# Patient Record
Sex: MALE | Race: BLACK OR AFRICAN AMERICAN | Employment: UNEMPLOYED
[De-identification: names, ages, dates, MRNs, and addresses within clinical notes are randomized per-mention and may not be internally consistent; named-entity substitution may affect disease eponyms.]

---

## 2022-10-31 PROBLEM — R53.1 WEAKNESS: Status: ACTIVE | Noted: 2022-10-31

## 2022-11-01 PROBLEM — G93.41 ACUTE METABOLIC ENCEPHALOPATHY: Status: ACTIVE | Noted: 2022-11-01

## 2022-11-01 PROBLEM — N39.0 UTI (URINARY TRACT INFECTION): Status: ACTIVE | Noted: 2022-11-01

## 2022-11-01 PROBLEM — R55 SYNCOPE: Status: ACTIVE | Noted: 2022-11-01

## 2022-11-04 PROBLEM — F03.90 DEMENTIA (HCC): Status: ACTIVE | Noted: 2022-11-04

## 2022-11-04 PROBLEM — N39.0 UTI (URINARY TRACT INFECTION): Status: RESOLVED | Noted: 2022-11-01 | Resolved: 2022-11-04

## 2024-05-06 ENCOUNTER — APPOINTMENT (OUTPATIENT)
Facility: HOSPITAL | Age: 84
End: 2024-05-06
Payer: MEDICARE

## 2024-05-06 ENCOUNTER — HOSPITAL ENCOUNTER (INPATIENT)
Facility: HOSPITAL | Age: 84
LOS: 18 days | Discharge: HOSPICE | End: 2024-05-24
Attending: EMERGENCY MEDICINE | Admitting: INTERNAL MEDICINE
Payer: MEDICARE

## 2024-05-06 DIAGNOSIS — R41.82 ALTERED MENTAL STATUS, UNSPECIFIED ALTERED MENTAL STATUS TYPE: ICD-10-CM

## 2024-05-06 DIAGNOSIS — Z51.5 ENCOUNTER FOR PALLIATIVE CARE: Primary | ICD-10-CM

## 2024-05-06 DIAGNOSIS — R06.02 SHORTNESS OF BREATH: ICD-10-CM

## 2024-05-06 DIAGNOSIS — J96.01 ACUTE HYPOXEMIC RESPIRATORY FAILURE (HCC): ICD-10-CM

## 2024-05-06 PROBLEM — J96.00 ACUTE RESPIRATORY FAILURE (HCC): Status: ACTIVE | Noted: 2024-05-06

## 2024-05-06 LAB
ALBUMIN SERPL-MCNC: 3.9 G/DL (ref 3.4–5)
ALBUMIN/GLOB SERPL: 1.2 (ref 0.8–1.7)
ALP SERPL-CCNC: 75 U/L (ref 45–117)
ALT SERPL-CCNC: 14 U/L (ref 16–61)
AMMONIA PLAS-SCNC: 23 UMOL/L (ref 11–32)
ANION GAP BLD CALC-SCNC: ABNORMAL MMOL/L (ref 10–20)
ANION GAP SERPL CALC-SCNC: 9 MMOL/L (ref 3–18)
APAP SERPL-MCNC: <2 UG/ML (ref 10–30)
APPEARANCE UR: CLEAR
ARTERIAL PATENCY WRIST A: POSITIVE
AST SERPL-CCNC: 13 U/L (ref 10–38)
B PERT DNA SPEC QL NAA+PROBE: NOT DETECTED
BACTERIA URNS QL MICRO: ABNORMAL /HPF
BASE DEFICIT BLD-SCNC: 6.4 MMOL/L
BASE DEFICIT BLD-SCNC: 9.8 MMOL/L
BASOPHILS # BLD: 0 K/UL (ref 0–0.1)
BASOPHILS NFR BLD: 0 % (ref 0–2)
BDY SITE: ABNORMAL
BDY SITE: ABNORMAL
BILIRUB SERPL-MCNC: 0.3 MG/DL (ref 0.2–1)
BILIRUB UR QL: NEGATIVE
BODY TEMPERATURE: 97.5
BORDETELLA PARAPERTUSSIS BY PCR: NOT DETECTED
BUN SERPL-MCNC: 20 MG/DL (ref 7–18)
BUN/CREAT SERPL: 11 (ref 12–20)
C PNEUM DNA SPEC QL NAA+PROBE: NOT DETECTED
CA-I BLD-MCNC: 1.18 MMOL/L (ref 1.12–1.32)
CALCIUM SERPL-MCNC: 8.7 MG/DL (ref 8.5–10.1)
CHLORIDE BLD-SCNC: 111 MMOL/L (ref 98–107)
CHLORIDE SERPL-SCNC: 107 MMOL/L (ref 100–111)
CO2 BLD-SCNC: 19 MMOL/L (ref 19–24)
CO2 SERPL-SCNC: 22 MMOL/L (ref 21–32)
COLOR UR: ABNORMAL
CREAT BLD-MCNC: 1.41 MG/DL (ref 0.6–1.3)
CREAT SERPL-MCNC: 1.85 MG/DL (ref 0.6–1.3)
DIFFERENTIAL METHOD BLD: ABNORMAL
EOSINOPHIL # BLD: 0 K/UL (ref 0–0.4)
EOSINOPHIL NFR BLD: 0 % (ref 0–5)
EPITH CASTS URNS QL MICRO: ABNORMAL /LPF (ref 0–5)
ERYTHROCYTE [DISTWIDTH] IN BLOOD BY AUTOMATED COUNT: 13.4 % (ref 11.6–14.5)
ETHANOL SERPL-MCNC: <3 MG/DL (ref 0–3)
FIO2 ON VENT: 100 %
FLUAV SUBTYP SPEC NAA+PROBE: NOT DETECTED
FLUBV RNA SPEC QL NAA+PROBE: NOT DETECTED
GAS FLOW.O2 O2 DELIVERY SYS: ABNORMAL
GAS FLOW.O2 O2 DELIVERY SYS: ABNORMAL
GAS FLOW.O2 SETTING OXYMISER: 22 BPM
GLOBULIN SER CALC-MCNC: 3.2 G/DL (ref 2–4)
GLUCOSE BLD-MCNC: 194 MG/DL (ref 65–100)
GLUCOSE SERPL-MCNC: 192 MG/DL (ref 74–99)
GLUCOSE UR STRIP.AUTO-MCNC: NEGATIVE MG/DL
HADV DNA SPEC QL NAA+PROBE: NOT DETECTED
HCO3 BLD-SCNC: 16.6 MMOL/L (ref 22–26)
HCO3 BLD-SCNC: 18.8 MMOL/L (ref 22–26)
HCOV 229E RNA SPEC QL NAA+PROBE: NOT DETECTED
HCOV HKU1 RNA SPEC QL NAA+PROBE: NOT DETECTED
HCOV NL63 RNA SPEC QL NAA+PROBE: NOT DETECTED
HCOV OC43 RNA SPEC QL NAA+PROBE: NOT DETECTED
HCT VFR BLD AUTO: 45.1 % (ref 36–48)
HGB BLD-MCNC: 14.6 G/DL (ref 13–16)
HGB UR QL STRIP: NEGATIVE
HMPV RNA SPEC QL NAA+PROBE: NOT DETECTED
HPIV1 RNA SPEC QL NAA+PROBE: NOT DETECTED
HPIV2 RNA SPEC QL NAA+PROBE: NOT DETECTED
HPIV3 RNA SPEC QL NAA+PROBE: DETECTED
HPIV4 RNA SPEC QL NAA+PROBE: NOT DETECTED
IMM GRANULOCYTES # BLD AUTO: 0 K/UL (ref 0–0.04)
IMM GRANULOCYTES NFR BLD AUTO: 0 % (ref 0–0.5)
INR PPP: 1.1 (ref 0.9–1.1)
IPAP/PIP: 18
KETONES UR QL STRIP.AUTO: ABNORMAL MG/DL
LACTATE BLD-SCNC: 3.5 MMOL/L (ref 0.4–2)
LACTATE BLD-SCNC: 4.8 MMOL/L (ref 0.4–2)
LACTATE SERPL-SCNC: 3.9 MMOL/L (ref 0.4–2)
LEUKOCYTE ESTERASE UR QL STRIP.AUTO: NEGATIVE
LIPASE SERPL-CCNC: 38 U/L (ref 13–75)
LYMPHOCYTES # BLD: 1.3 K/UL (ref 0.9–3.6)
LYMPHOCYTES NFR BLD: 18 % (ref 21–52)
M PNEUMO DNA SPEC QL NAA+PROBE: NOT DETECTED
MAGNESIUM SERPL-MCNC: 1.9 MG/DL (ref 1.6–2.6)
MCH RBC QN AUTO: 30.5 PG (ref 24–34)
MCHC RBC AUTO-ENTMCNC: 32.4 G/DL (ref 31–37)
MCV RBC AUTO: 94.4 FL (ref 78–100)
MONOCYTES # BLD: 0.3 K/UL (ref 0.05–1.2)
MONOCYTES NFR BLD: 4 % (ref 3–10)
NEUTS SEG # BLD: 5.9 K/UL (ref 1.8–8)
NEUTS SEG NFR BLD: 78 % (ref 40–73)
NITRITE UR QL STRIP.AUTO: NEGATIVE
NRBC # BLD: 0 K/UL (ref 0–0.01)
NRBC BLD-RTO: 0 PER 100 WBC
O2/TOTAL GAS SETTING VFR VENT: 100 %
PCO2 BLD: 34.9 MMHG (ref 35–45)
PCO2 BLD: 37.5 MMHG (ref 35–45)
PEEP RESPIRATORY: 8
PH BLD: 7.26 (ref 7.35–7.45)
PH BLD: 7.34 (ref 7.35–7.45)
PH UR STRIP: 5.5 (ref 5–8)
PLATELET # BLD AUTO: 226 K/UL (ref 135–420)
PMV BLD AUTO: 10.1 FL (ref 9.2–11.8)
PO2 BLD: 51 MMHG (ref 80–100)
PO2 BLD: 79 MMHG (ref 80–100)
POTASSIUM BLD-SCNC: 3.9 MMOL/L (ref 3.5–5.1)
POTASSIUM SERPL-SCNC: 4.1 MMOL/L (ref 3.5–5.5)
PRESSURE SUPPORT SETTING VENT: 10
PROCALCITONIN SERPL-MCNC: <0.05 NG/ML
PROT SERPL-MCNC: 7.1 G/DL (ref 6.4–8.2)
PROT UR STRIP-MCNC: ABNORMAL MG/DL
PROTHROMBIN TIME: 13.8 SEC (ref 11.9–14.7)
RBC # BLD AUTO: 4.78 M/UL (ref 4.35–5.65)
RBC #/AREA URNS HPF: NEGATIVE /HPF (ref 0–5)
RESPIRATORY RATE, POC: 33 (ref 5–40)
RSV RNA SPEC QL NAA+PROBE: NOT DETECTED
RV+EV RNA SPEC QL NAA+PROBE: NOT DETECTED
SALICYLATES SERPL-MCNC: <1.7 MG/DL (ref 2.8–20)
SAO2 % BLD: 85 %
SAO2 % BLD: 93.5 % (ref 92–97)
SARS-COV-2 RNA RESP QL NAA+PROBE: NOT DETECTED
SERVICE CMNT-IMP: ABNORMAL
SODIUM BLD-SCNC: 140 MMOL/L (ref 136–145)
SODIUM SERPL-SCNC: 138 MMOL/L (ref 136–145)
SP GR UR REFRACTOMETRY: 1.02 (ref 1–1.03)
SPECIMEN SITE: ABNORMAL
SPECIMEN TYPE: ABNORMAL
T4 FREE SERPL-MCNC: 1.4 NG/DL (ref 0.7–1.5)
TRIGL SERPL-MCNC: 138 MG/DL
TROPONIN I SERPL HS-MCNC: 3 NG/L (ref 0–78)
TSH SERPL DL<=0.05 MIU/L-ACNC: 9.37 UIU/ML (ref 0.36–3.74)
UROBILINOGEN UR QL STRIP.AUTO: 1 EU/DL (ref 0.2–1)
VENTILATION MODE VENT: ABNORMAL
VENTILATION MODE VENT: ABNORMAL
VT SETTING VENT: 450 ML
WBC # BLD AUTO: 7.5 K/UL (ref 4.6–13.2)
WBC URNS QL MICRO: ABNORMAL /HPF (ref 0–4)

## 2024-05-06 PROCEDURE — 71045 X-RAY EXAM CHEST 1 VIEW: CPT

## 2024-05-06 PROCEDURE — 94660 CPAP INITIATION&MGMT: CPT

## 2024-05-06 PROCEDURE — 83605 ASSAY OF LACTIC ACID: CPT

## 2024-05-06 PROCEDURE — 71275 CT ANGIOGRAPHY CHEST: CPT

## 2024-05-06 PROCEDURE — 74177 CT ABD & PELVIS W/CONTRAST: CPT

## 2024-05-06 PROCEDURE — 99291 CRITICAL CARE FIRST HOUR: CPT

## 2024-05-06 PROCEDURE — 84295 ASSAY OF SERUM SODIUM: CPT

## 2024-05-06 PROCEDURE — 80053 COMPREHEN METABOLIC PANEL: CPT

## 2024-05-06 PROCEDURE — 87154 CUL TYP ID BLD PTHGN 6+ TRGT: CPT

## 2024-05-06 PROCEDURE — 84443 ASSAY THYROID STIM HORMONE: CPT

## 2024-05-06 PROCEDURE — 02HV33Z INSERTION OF INFUSION DEVICE INTO SUPERIOR VENA CAVA, PERCUTANEOUS APPROACH: ICD-10-PCS | Performed by: INTERNAL MEDICINE

## 2024-05-06 PROCEDURE — 85610 PROTHROMBIN TIME: CPT

## 2024-05-06 PROCEDURE — 36556 INSERT NON-TUNNEL CV CATH: CPT

## 2024-05-06 PROCEDURE — 82947 ASSAY GLUCOSE BLOOD QUANT: CPT

## 2024-05-06 PROCEDURE — 36600 WITHDRAWAL OF ARTERIAL BLOOD: CPT

## 2024-05-06 PROCEDURE — 96374 THER/PROPH/DIAG INJ IV PUSH: CPT

## 2024-05-06 PROCEDURE — 6360000002 HC RX W HCPCS: Performed by: EMERGENCY MEDICINE

## 2024-05-06 PROCEDURE — 83735 ASSAY OF MAGNESIUM: CPT

## 2024-05-06 PROCEDURE — 82140 ASSAY OF AMMONIA: CPT

## 2024-05-06 PROCEDURE — 6360000002 HC RX W HCPCS

## 2024-05-06 PROCEDURE — 80179 DRUG ASSAY SALICYLATE: CPT

## 2024-05-06 PROCEDURE — 84439 ASSAY OF FREE THYROXINE: CPT

## 2024-05-06 PROCEDURE — 2580000003 HC RX 258: Performed by: EMERGENCY MEDICINE

## 2024-05-06 PROCEDURE — 84478 ASSAY OF TRIGLYCERIDES: CPT

## 2024-05-06 PROCEDURE — 82077 ASSAY SPEC XCP UR&BREATH IA: CPT

## 2024-05-06 PROCEDURE — 87449 NOS EACH ORGANISM AG IA: CPT

## 2024-05-06 PROCEDURE — 70450 CT HEAD/BRAIN W/O DYE: CPT

## 2024-05-06 PROCEDURE — 0202U NFCT DS 22 TRGT SARS-COV-2: CPT

## 2024-05-06 PROCEDURE — 6360000004 HC RX CONTRAST MEDICATION: Performed by: EMERGENCY MEDICINE

## 2024-05-06 PROCEDURE — 31500 INSERT EMERGENCY AIRWAY: CPT

## 2024-05-06 PROCEDURE — 82330 ASSAY OF CALCIUM: CPT

## 2024-05-06 PROCEDURE — 0BJ08ZZ INSPECTION OF TRACHEOBRONCHIAL TREE, VIA NATURAL OR ARTIFICIAL OPENING ENDOSCOPIC: ICD-10-PCS | Performed by: INTERNAL MEDICINE

## 2024-05-06 PROCEDURE — 84132 ASSAY OF SERUM POTASSIUM: CPT

## 2024-05-06 PROCEDURE — 1100000000 HC RM PRIVATE

## 2024-05-06 PROCEDURE — 94002 VENT MGMT INPAT INIT DAY: CPT

## 2024-05-06 PROCEDURE — 84145 PROCALCITONIN (PCT): CPT

## 2024-05-06 PROCEDURE — 2500000003 HC RX 250 WO HCPCS: Performed by: EMERGENCY MEDICINE

## 2024-05-06 PROCEDURE — 0BH17EZ INSERTION OF ENDOTRACHEAL AIRWAY INTO TRACHEA, VIA NATURAL OR ARTIFICIAL OPENING: ICD-10-PCS | Performed by: INTERNAL MEDICINE

## 2024-05-06 PROCEDURE — 83690 ASSAY OF LIPASE: CPT

## 2024-05-06 PROCEDURE — 93005 ELECTROCARDIOGRAM TRACING: CPT | Performed by: EMERGENCY MEDICINE

## 2024-05-06 PROCEDURE — 3E043XZ INTRODUCTION OF VASOPRESSOR INTO CENTRAL VEIN, PERCUTANEOUS APPROACH: ICD-10-PCS | Performed by: INTERNAL MEDICINE

## 2024-05-06 PROCEDURE — 82803 BLOOD GASES ANY COMBINATION: CPT

## 2024-05-06 PROCEDURE — 80143 DRUG ASSAY ACETAMINOPHEN: CPT

## 2024-05-06 PROCEDURE — 96375 TX/PRO/DX INJ NEW DRUG ADDON: CPT

## 2024-05-06 PROCEDURE — 85014 HEMATOCRIT: CPT

## 2024-05-06 PROCEDURE — 84484 ASSAY OF TROPONIN QUANT: CPT

## 2024-05-06 PROCEDURE — 85025 COMPLETE CBC W/AUTO DIFF WBC: CPT

## 2024-05-06 PROCEDURE — 96365 THER/PROPH/DIAG IV INF INIT: CPT

## 2024-05-06 PROCEDURE — 81001 URINALYSIS AUTO W/SCOPE: CPT

## 2024-05-06 PROCEDURE — 5A1955Z RESPIRATORY VENTILATION, GREATER THAN 96 CONSECUTIVE HOURS: ICD-10-PCS | Performed by: INTERNAL MEDICINE

## 2024-05-06 PROCEDURE — 87040 BLOOD CULTURE FOR BACTERIA: CPT

## 2024-05-06 RX ORDER — THIAMINE HYDROCHLORIDE 100 MG/ML
100 INJECTION, SOLUTION INTRAMUSCULAR; INTRAVENOUS DAILY
Status: DISCONTINUED | OUTPATIENT
Start: 2024-05-07 | End: 2024-05-23

## 2024-05-06 RX ORDER — ONDANSETRON 4 MG/1
4 TABLET, ORALLY DISINTEGRATING ORAL EVERY 8 HOURS PRN
Status: DISCONTINUED | OUTPATIENT
Start: 2024-05-06 | End: 2024-05-24 | Stop reason: HOSPADM

## 2024-05-06 RX ORDER — PROPOFOL 10 MG/ML
5-50 INJECTION, EMULSION INTRAVENOUS CONTINUOUS
Status: DISCONTINUED | OUTPATIENT
Start: 2024-05-06 | End: 2024-05-06

## 2024-05-06 RX ORDER — DEXMEDETOMIDINE HYDROCHLORIDE 4 UG/ML
.1-1.5 INJECTION, SOLUTION INTRAVENOUS CONTINUOUS
Status: DISCONTINUED | OUTPATIENT
Start: 2024-05-06 | End: 2024-05-12

## 2024-05-06 RX ORDER — NALOXONE HYDROCHLORIDE 0.4 MG/ML
INJECTION, SOLUTION INTRAMUSCULAR; INTRAVENOUS; SUBCUTANEOUS
Status: COMPLETED
Start: 2024-05-06 | End: 2024-05-06

## 2024-05-06 RX ORDER — POLYETHYLENE GLYCOL 3350 17 G/17G
34 POWDER, FOR SOLUTION ORAL 2 TIMES DAILY
Status: DISCONTINUED | OUTPATIENT
Start: 2024-05-06 | End: 2024-05-12

## 2024-05-06 RX ORDER — ONDANSETRON 2 MG/ML
4 INJECTION INTRAMUSCULAR; INTRAVENOUS EVERY 6 HOURS PRN
Status: DISCONTINUED | OUTPATIENT
Start: 2024-05-06 | End: 2024-05-23

## 2024-05-06 RX ORDER — IPRATROPIUM BROMIDE AND ALBUTEROL SULFATE 2.5; .5 MG/3ML; MG/3ML
1 SOLUTION RESPIRATORY (INHALATION) EVERY 4 HOURS PRN
Status: DISCONTINUED | OUTPATIENT
Start: 2024-05-06 | End: 2024-05-24 | Stop reason: HOSPADM

## 2024-05-06 RX ORDER — IODIXANOL 320 MG/ML
80 INJECTION, SOLUTION INTRAVASCULAR
Status: COMPLETED | OUTPATIENT
Start: 2024-05-06 | End: 2024-05-06

## 2024-05-06 RX ORDER — ACETAMINOPHEN 650 MG/1
650 SUPPOSITORY RECTAL EVERY 6 HOURS PRN
Status: DISCONTINUED | OUTPATIENT
Start: 2024-05-06 | End: 2024-05-23 | Stop reason: SDUPTHER

## 2024-05-06 RX ORDER — FENTANYL CITRATE 50 UG/ML
50 INJECTION, SOLUTION INTRAMUSCULAR; INTRAVENOUS EVERY 30 MIN PRN
Status: DISCONTINUED | OUTPATIENT
Start: 2024-05-06 | End: 2024-05-13

## 2024-05-06 RX ORDER — NOREPINEPHRINE BITARTRATE 0.06 MG/ML
1-100 INJECTION, SOLUTION INTRAVENOUS CONTINUOUS
Status: DISCONTINUED | OUTPATIENT
Start: 2024-05-06 | End: 2024-05-12

## 2024-05-06 RX ORDER — ARFORMOTEROL TARTRATE 15 UG/2ML
15 SOLUTION RESPIRATORY (INHALATION)
Status: DISCONTINUED | OUTPATIENT
Start: 2024-05-06 | End: 2024-05-07

## 2024-05-06 RX ORDER — CHLORHEXIDINE GLUCONATE ORAL RINSE 1.2 MG/ML
15 SOLUTION DENTAL 2 TIMES DAILY
Status: DISCONTINUED | OUTPATIENT
Start: 2024-05-06 | End: 2024-05-23

## 2024-05-06 RX ORDER — POLYETHYLENE GLYCOL 3350 17 G/17G
17 POWDER, FOR SOLUTION ORAL DAILY PRN
Status: DISCONTINUED | OUTPATIENT
Start: 2024-05-06 | End: 2024-05-06

## 2024-05-06 RX ORDER — HEPARIN SODIUM 5000 [USP'U]/ML
5000 INJECTION, SOLUTION INTRAVENOUS; SUBCUTANEOUS EVERY 8 HOURS SCHEDULED
Status: DISCONTINUED | OUTPATIENT
Start: 2024-05-06 | End: 2024-05-23

## 2024-05-06 RX ORDER — FENTANYL CITRATE-0.9 % NACL/PF 10 MCG/ML
25-200 PLASTIC BAG, INJECTION (ML) INTRAVENOUS CONTINUOUS
Status: DISCONTINUED | OUTPATIENT
Start: 2024-05-06 | End: 2024-05-06 | Stop reason: CLARIF

## 2024-05-06 RX ORDER — FENTANYL CITRATE-0.9 % NACL/PF 20 MCG/2ML
50 SYRINGE (ML) INTRAVENOUS EVERY 30 MIN PRN
Status: DISCONTINUED | OUTPATIENT
Start: 2024-05-06 | End: 2024-05-06 | Stop reason: CLARIF

## 2024-05-06 RX ORDER — ACETAMINOPHEN 325 MG/1
650 TABLET ORAL EVERY 6 HOURS PRN
Status: DISCONTINUED | OUTPATIENT
Start: 2024-05-06 | End: 2024-05-23

## 2024-05-06 RX ORDER — FENTANYL CITRATE-0.9 % NACL/PF 10 MCG/ML
25-200 PLASTIC BAG, INJECTION (ML) INTRAVENOUS CONTINUOUS
Status: DISCONTINUED | OUTPATIENT
Start: 2024-05-06 | End: 2024-05-08

## 2024-05-06 RX ORDER — MIDAZOLAM HYDROCHLORIDE 1 MG/ML
5 INJECTION INTRAMUSCULAR; INTRAVENOUS
Status: COMPLETED | OUTPATIENT
Start: 2024-05-06 | End: 2024-05-06

## 2024-05-06 RX ORDER — METRONIDAZOLE 500 MG/100ML
500 INJECTION, SOLUTION INTRAVENOUS EVERY 8 HOURS
Status: DISCONTINUED | OUTPATIENT
Start: 2024-05-06 | End: 2024-05-07

## 2024-05-06 RX ORDER — BUDESONIDE 1 MG/2ML
1 INHALANT ORAL 2 TIMES DAILY
Status: DISCONTINUED | OUTPATIENT
Start: 2024-05-06 | End: 2024-05-07

## 2024-05-06 RX ADMIN — FENTANYL CITRATE 50 MCG/HR: at 20:52

## 2024-05-06 RX ADMIN — IODIXANOL 80 ML: 320 INJECTION, SOLUTION INTRAVASCULAR at 20:22

## 2024-05-06 RX ADMIN — PIPERACILLIN AND TAZOBACTAM 4500 MG: 4; .5 INJECTION, POWDER, FOR SOLUTION INTRAVENOUS at 19:41

## 2024-05-06 RX ADMIN — NALOXONE HYDROCHLORIDE 0.4 MG: 0.4 INJECTION, SOLUTION INTRAMUSCULAR; INTRAVENOUS; SUBCUTANEOUS at 17:45

## 2024-05-06 RX ADMIN — Medication 5 MCG/MIN: at 19:18

## 2024-05-06 RX ADMIN — PROPOFOL 20 MCG/KG/MIN: 10 INJECTION, EMULSION INTRAVENOUS at 19:02

## 2024-05-06 RX ADMIN — MIDAZOLAM 5 MG: 1 INJECTION INTRAMUSCULAR; INTRAVENOUS at 19:00

## 2024-05-06 ASSESSMENT — PULMONARY FUNCTION TESTS
PIF_VALUE: 27
PIF_VALUE: 14

## 2024-05-06 NOTE — ED NOTES
1835 - pt had large amount of emesis while on bipap. Mask removed and pt rolled onto side and suctioned as much emesis as possible. MD alerted. Verbal order for intubation.     1839 - Etomidate 20mg IV administered. 100mg succ IV administered.    1841 - Successful intubation, 24 at the lip. Placed on ventilator.

## 2024-05-06 NOTE — PROGRESS NOTES
Initiated Mechanical Ventilation due to hypoxia, unresponsiveness, and airway protection.  Patient vomiting and aspirating..   05/06/24 1900   Patient Observation   Pulse (!) 126   Respirations (!) 41   Breath Sounds   Breath Sounds Bilateral Crackles    Vent Information   Ventilator Day(s) 1   Ventilator Initiate Yes   Vent Mode AC/PC   $Ventilation $Initial Day   Ventilator Settings   FiO2  100 %   Insp Time (sec) 0.8 sec   Resp Rate (Set) 22 bpm   Set Pressure 16   PEEP/CPAP (cmH2O) 8   Vent Patient Data (Readings)   Vt (Measured) 590 mL   Peak Inspiratory Pressure (cmH2O) 27 cmH2O   Rate Measured 41 br/min   Minute Volume (L/min) 23.6 Liters   Mean Airway Pressure (cmH2O) 19 cmH20   I:E Ratio 1:2.1   Insp Rise Time (%) 50 %   Backup Rate 22 Breaths Per Minute   Vent Alarm Settings   High Pressure (cmH2O) 50 cmH2O   Low Minute Volume (lpm) 2 L/min   High Minute Volume (lpm) 30 L/min   Low Exhaled Vt (ml) 200 mL   RR High (bpm) 40 br/min   Apnea (secs) 20 secs   NIV Type   NIV Started/Stopped Off   Additional Respiratoray Assessments   Humidification Source Heated wire   Humidification Temp 37   Ambu Bag With Mask At Bedside Yes   Airway Clearance   Suction ET Tube   Suction Device Inline suction catheter   Sputum Method Obtained Endotracheal   Sputum Amount Large   Sputum Color/Odor Brown;Sheikh   Sputum Consistency Thin;Thick   ETT    Placement Date/Time: 05/06/24 1841   Present on Admission/Arrival: No  Placed By: In ED;Licensed provider  Placement Verified By: Auscultation;Capnometry;Colorimetric ETCO2 device;Direct visualization  Preoxygenation: Yes  Mask Ventilation: Ventilated...   $ Intubation Emergent  $ Yes   Secured At 24 cm   Measured From Lips   ETT Placement Center   Secured By Commercial tube busby

## 2024-05-06 NOTE — ED NOTES
MD is aware pt is extremely hard stick and IV's are not drawing back enough blood for labs.     MD at bedside now to place CVC.

## 2024-05-06 NOTE — ED TRIAGE NOTES
Pt arrived via EMS for AMS and hypoxia. [facility] adult [facility], began having AMS and vomiting. Per staff, pt became unresponsive. EMS arrived and pt responsive to painful sitmulus, satting in 80s on NRB.     Pt placed on bipap upon arrival.

## 2024-05-07 ENCOUNTER — APPOINTMENT (OUTPATIENT)
Facility: HOSPITAL | Age: 84
End: 2024-05-07
Payer: MEDICARE

## 2024-05-07 PROBLEM — G93.40 ACUTE ENCEPHALOPATHY: Status: ACTIVE | Noted: 2024-05-07

## 2024-05-07 PROBLEM — J69.0 ASPIRATION PNEUMONIA OF BOTH LOWER LOBES DUE TO VOMIT (HCC): Status: ACTIVE | Noted: 2024-05-07
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
## 2024-05-07 PROBLEM — T17.500A MUCUS PLUGGING OF BRONCHI: Status: ACTIVE | Noted: 2024-05-07

## 2024-05-07 PROBLEM — J96.01 ACUTE HYPOXEMIC RESPIRATORY FAILURE (HCC): Status: ACTIVE | Noted: 2024-05-07

## 2024-05-07 PROBLEM — T17.928A ASPIRATION OF FOOD: Status: ACTIVE | Noted: 2024-05-07

## 2024-05-07 LAB
ACCESSION NUMBER, LLC1M: ABNORMAL
ACINETOBACTER CALCOAC BAUMANNII COMPLEX BY PCR: NOT DETECTED
ANION GAP SERPL CALC-SCNC: 8 MMOL/L (ref 3–18)
ARTERIAL PATENCY WRIST A: POSITIVE
B FRAGILIS DNA BLD POS QL NAA+NON-PROBE: NOT DETECTED
BASE DEFICIT BLD-SCNC: 5.3 MMOL/L
BASOPHILS # BLD: 0 K/UL (ref 0–0.1)
BASOPHILS NFR BLD: 0 % (ref 0–2)
BDY SITE: ABNORMAL
BIOFIRE TEST COMMENT: ABNORMAL
BUN SERPL-MCNC: 25 MG/DL (ref 7–18)
BUN/CREAT SERPL: 11 (ref 12–20)
C ALBICANS DNA BLD POS QL NAA+NON-PROBE: NOT DETECTED
C AURIS DNA BLD POS QL NAA+NON-PROBE: NOT DETECTED
C GATTII+NEOFOR DNA BLD POS QL NAA+N-PRB: NOT DETECTED
C GLABRATA DNA BLD POS QL NAA+NON-PROBE: NOT DETECTED
C KRUSEI DNA BLD POS QL NAA+NON-PROBE: NOT DETECTED
C PARAP DNA BLD POS QL NAA+NON-PROBE: NOT DETECTED
C TROPICLS DNA BLD POS QL NAA+NON-PROBE: NOT DETECTED
CA-I SERPL-SCNC: 1.13 MMOL/L (ref 1.15–1.33)
CALCIUM SERPL-MCNC: 7.9 MG/DL (ref 8.5–10.1)
CHLORIDE SERPL-SCNC: 109 MMOL/L (ref 100–111)
CO2 SERPL-SCNC: 20 MMOL/L (ref 21–32)
CREAT SERPL-MCNC: 2.23 MG/DL (ref 0.6–1.3)
DIFFERENTIAL METHOD BLD: ABNORMAL
E CLOAC COMP DNA BLD POS NAA+NON-PROBE: NOT DETECTED
E COLI DNA BLD POS QL NAA+NON-PROBE: NOT DETECTED
E FAECALIS DNA BLD POS QL NAA+NON-PROBE: NOT DETECTED
E FAECIUM DNA BLD POS QL NAA+NON-PROBE: NOT DETECTED
EKG ATRIAL RATE: 70 BPM
EKG DIAGNOSIS: NORMAL
EKG P AXIS: 53 DEGREES
EKG P-R INTERVAL: 150 MS
EKG Q-T INTERVAL: 456 MS
EKG QRS DURATION: 76 MS
EKG QTC CALCULATION (BAZETT): 492 MS
EKG R AXIS: 13 DEGREES
EKG T AXIS: 49 DEGREES
EKG VENTRICULAR RATE: 70 BPM
ENTEROBACTERALES DNA BLD POS NAA+N-PRB: NOT DETECTED
EOSINOPHIL # BLD: 0 K/UL (ref 0–0.4)
EOSINOPHIL NFR BLD: 0 % (ref 0–5)
ERYTHROCYTE [DISTWIDTH] IN BLOOD BY AUTOMATED COUNT: 13.7 % (ref 11.6–14.5)
GAS FLOW.O2 O2 DELIVERY SYS: ABNORMAL
GAS FLOW.O2 SETTING OXYMISER: 22 BPM
GLUCOSE BLD STRIP.AUTO-MCNC: 177 MG/DL (ref 70–110)
GLUCOSE BLD STRIP.AUTO-MCNC: 197 MG/DL (ref 70–110)
GLUCOSE SERPL-MCNC: 178 MG/DL (ref 74–99)
GP B STREP DNA BLD POS QL NAA+NON-PROBE: NOT DETECTED
HAEM INFLU DNA BLD POS QL NAA+NON-PROBE: NOT DETECTED
HCO3 BLD-SCNC: 19.5 MMOL/L (ref 22–26)
HCT VFR BLD AUTO: 38.3 % (ref 36–48)
HGB BLD-MCNC: 12.7 G/DL (ref 13–16)
IMM GRANULOCYTES # BLD AUTO: 0 K/UL
IMM GRANULOCYTES NFR BLD AUTO: 0 %
INSPIRATION.DURATION SETTING TIME VENT: 0.8 SEC
IPAP/PIP/HIGH PEEP: 14
K OXYTOCA DNA BLD POS QL NAA+NON-PROBE: NOT DETECTED
KLEBSIELLA SP DNA BLD POS QL NAA+NON-PRB: NOT DETECTED
KLEBSIELLA SP DNA BLD POS QL NAA+NON-PRB: NOT DETECTED
L MONOCYTOG DNA BLD POS QL NAA+NON-PROBE: NOT DETECTED
L PNEUMO AG UR QL IA: NEGATIVE
LACTATE SERPL-SCNC: 3.2 MMOL/L (ref 0.4–2)
LACTATE SERPL-SCNC: 3.9 MMOL/L (ref 0.4–2)
LACTATE SERPL-SCNC: 4.9 MMOL/L (ref 0.4–2)
LACTATE SERPL-SCNC: 5.2 MMOL/L (ref 0.4–2)
LACTATE SERPL-SCNC: 5.3 MMOL/L (ref 0.4–2)
LACTATE SERPL-SCNC: 5.5 MMOL/L (ref 0.4–2)
LIPASE SERPL-CCNC: 33 U/L (ref 13–75)
LYMPHOCYTES # BLD: 1.8 K/UL (ref 0.9–3.6)
LYMPHOCYTES NFR BLD: 28 % (ref 21–52)
MAGNESIUM SERPL-MCNC: 1.7 MG/DL (ref 1.6–2.6)
MCH RBC QN AUTO: 31.2 PG (ref 24–34)
MCHC RBC AUTO-ENTMCNC: 33.2 G/DL (ref 31–37)
MCV RBC AUTO: 94.1 FL (ref 78–100)
MECA+MECC ISLT/SPM QL: DETECTED
MONOCYTES # BLD: 0.4 K/UL (ref 0.05–1.2)
MONOCYTES NFR BLD: 6 % (ref 3–10)
N MEN DNA BLD POS QL NAA+NON-PROBE: NOT DETECTED
NEUTS BAND NFR BLD MANUAL: 10 % (ref 0–5)
NEUTS SEG # BLD: 4.4 K/UL (ref 1.8–8)
NEUTS SEG NFR BLD: 56 % (ref 40–73)
NRBC # BLD: 0 K/UL (ref 0–0.01)
NRBC BLD-RTO: 0 PER 100 WBC
O2/TOTAL GAS SETTING VFR VENT: 100 %
P AERUGINOSA DNA BLD POS NAA+NON-PROBE: NOT DETECTED
PAW @ MEAN EXP FLOW ON VENT: 11 CMH2O
PCO2 BLD: 35.1 MMHG (ref 35–45)
PEEP RESPIRATORY: 8 CMH2O
PH BLD: 7.35 (ref 7.35–7.45)
PHOSPHATE SERPL-MCNC: 2.5 MG/DL (ref 2.5–4.9)
PHOSPHATE SERPL-MCNC: 3.1 MG/DL (ref 2.5–4.9)
PLATELET # BLD AUTO: 216 K/UL (ref 135–420)
PLATELET COMMENT: ABNORMAL
PMV BLD AUTO: 10.2 FL (ref 9.2–11.8)
PO2 BLD: 66 MMHG (ref 80–100)
POTASSIUM SERPL-SCNC: 4.6 MMOL/L (ref 3.5–5.5)
PROTEUS SP DNA BLD POS QL NAA+NON-PROBE: NOT DETECTED
RBC # BLD AUTO: 4.07 M/UL (ref 4.35–5.65)
RBC MORPH BLD: ABNORMAL
RESISTANT GENE TARGETS: ABNORMAL
RESPIRATORY RATE, POC: 24 (ref 5–40)
S AUREUS DNA BLD POS QL NAA+NON-PROBE: NOT DETECTED
S AUREUS+CONS DNA BLD POS NAA+NON-PROBE: DETECTED
S EPIDERMIDIS DNA BLD POS QL NAA+NON-PRB: DETECTED
S LUGDUNENSIS DNA BLD POS QL NAA+NON-PRB: NOT DETECTED
S MALTOPHILIA DNA BLD POS QL NAA+NON-PRB: NOT DETECTED
S MARCESCENS DNA BLD POS NAA+NON-PROBE: NOT DETECTED
S PNEUM AG UR QL: NEGATIVE
S PNEUM DNA BLD POS QL NAA+NON-PROBE: NOT DETECTED
S PYO DNA BLD POS QL NAA+NON-PROBE: NOT DETECTED
SALMONELLA DNA BLD POS QL NAA+NON-PROBE: NOT DETECTED
SAO2 % BLD: 92 % (ref 92–97)
SERVICE CMNT-IMP: ABNORMAL
SODIUM SERPL-SCNC: 137 MMOL/L (ref 136–145)
SPECIMEN TYPE: ABNORMAL
STREPTOCOCCUS DNA BLD POS NAA+NON-PROBE: NOT DETECTED
TROPONIN I SERPL HS-MCNC: 20 NG/L (ref 0–78)
TROPONIN I SERPL HS-MCNC: 24 NG/L (ref 0–78)
TROPONIN I SERPL HS-MCNC: 29 NG/L (ref 0–78)
TROPONIN I SERPL HS-MCNC: 30 NG/L (ref 0–78)
VENTILATION MODE VENT: ABNORMAL
WBC # BLD AUTO: 6.6 K/UL (ref 4.6–13.2)

## 2024-05-07 PROCEDURE — 87088 URINE BACTERIA CULTURE: CPT

## 2024-05-07 PROCEDURE — 94761 N-INVAS EAR/PLS OXIMETRY MLT: CPT

## 2024-05-07 PROCEDURE — 31624 DX BRONCHOSCOPE/LAVAGE: CPT

## 2024-05-07 PROCEDURE — 71045 X-RAY EXAM CHEST 1 VIEW: CPT

## 2024-05-07 PROCEDURE — 84484 ASSAY OF TROPONIN QUANT: CPT

## 2024-05-07 PROCEDURE — 83735 ASSAY OF MAGNESIUM: CPT

## 2024-05-07 PROCEDURE — 87070 CULTURE OTHR SPECIMN AEROBIC: CPT

## 2024-05-07 PROCEDURE — 82330 ASSAY OF CALCIUM: CPT

## 2024-05-07 PROCEDURE — 89220 SPUTUM SPECIMEN COLLECTION: CPT

## 2024-05-07 PROCEDURE — 87186 SC STD MICRODIL/AGAR DIL: CPT

## 2024-05-07 PROCEDURE — 6370000000 HC RX 637 (ALT 250 FOR IP): Performed by: EMERGENCY MEDICINE

## 2024-05-07 PROCEDURE — 80048 BASIC METABOLIC PNL TOTAL CA: CPT

## 2024-05-07 PROCEDURE — 82962 GLUCOSE BLOOD TEST: CPT

## 2024-05-07 PROCEDURE — 82803 BLOOD GASES ANY COMBINATION: CPT

## 2024-05-07 PROCEDURE — 6370000000 HC RX 637 (ALT 250 FOR IP): Performed by: NURSE PRACTITIONER

## 2024-05-07 PROCEDURE — 83605 ASSAY OF LACTIC ACID: CPT

## 2024-05-07 PROCEDURE — 2500000003 HC RX 250 WO HCPCS: Performed by: EMERGENCY MEDICINE

## 2024-05-07 PROCEDURE — 84100 ASSAY OF PHOSPHORUS: CPT

## 2024-05-07 PROCEDURE — 6360000002 HC RX W HCPCS: Performed by: INTERNAL MEDICINE

## 2024-05-07 PROCEDURE — 2580000003 HC RX 258: Performed by: REGISTERED NURSE

## 2024-05-07 PROCEDURE — 83690 ASSAY OF LIPASE: CPT

## 2024-05-07 PROCEDURE — 2580000003 HC RX 258: Performed by: EMERGENCY MEDICINE

## 2024-05-07 PROCEDURE — 87205 SMEAR GRAM STAIN: CPT

## 2024-05-07 PROCEDURE — 6360000002 HC RX W HCPCS: Performed by: REGISTERED NURSE

## 2024-05-07 PROCEDURE — 85025 COMPLETE CBC W/AUTO DIFF WBC: CPT

## 2024-05-07 PROCEDURE — 93010 ELECTROCARDIOGRAM REPORT: CPT | Performed by: INTERNAL MEDICINE

## 2024-05-07 PROCEDURE — 6360000002 HC RX W HCPCS: Performed by: NURSE PRACTITIONER

## 2024-05-07 PROCEDURE — 99292 CRITICAL CARE ADDL 30 MIN: CPT | Performed by: INTERNAL MEDICINE

## 2024-05-07 PROCEDURE — 2700000000 HC OXYGEN THERAPY PER DAY

## 2024-05-07 PROCEDURE — 2580000003 HC RX 258: Performed by: NURSE PRACTITIONER

## 2024-05-07 PROCEDURE — A4216 STERILE WATER/SALINE, 10 ML: HCPCS | Performed by: EMERGENCY MEDICINE

## 2024-05-07 PROCEDURE — 94003 VENT MGMT INPAT SUBQ DAY: CPT

## 2024-05-07 PROCEDURE — 2580000003 HC RX 258: Performed by: INTERNAL MEDICINE

## 2024-05-07 PROCEDURE — 31624 DX BRONCHOSCOPE/LAVAGE: CPT | Performed by: INTERNAL MEDICINE

## 2024-05-07 PROCEDURE — 36600 WITHDRAWAL OF ARTERIAL BLOOD: CPT

## 2024-05-07 PROCEDURE — 2000000000 HC ICU R&B

## 2024-05-07 PROCEDURE — 99223 1ST HOSP IP/OBS HIGH 75: CPT | Performed by: INTERNAL MEDICINE

## 2024-05-07 PROCEDURE — 99291 CRITICAL CARE FIRST HOUR: CPT | Performed by: INTERNAL MEDICINE

## 2024-05-07 PROCEDURE — 2500000003 HC RX 250 WO HCPCS: Performed by: NURSE PRACTITIONER

## 2024-05-07 PROCEDURE — 31635 BRONCHOSCOPY W/FB REMOVAL: CPT | Performed by: INTERNAL MEDICINE

## 2024-05-07 PROCEDURE — 31645 BRNCHSC W/THER ASPIR 1ST: CPT | Performed by: INTERNAL MEDICINE

## 2024-05-07 PROCEDURE — APPSS30 APP SPLIT SHARED TIME 16-30 MINUTES: Performed by: NURSE PRACTITIONER

## 2024-05-07 PROCEDURE — 94640 AIRWAY INHALATION TREATMENT: CPT

## 2024-05-07 RX ORDER — ARFORMOTEROL TARTRATE 15 UG/2ML
15 SOLUTION RESPIRATORY (INHALATION)
Status: DISCONTINUED | OUTPATIENT
Start: 2024-05-07 | End: 2024-05-08

## 2024-05-07 RX ORDER — BUDESONIDE 1 MG/2ML
1 INHALANT ORAL 2 TIMES DAILY
Status: DISCONTINUED | OUTPATIENT
Start: 2024-05-07 | End: 2024-05-10

## 2024-05-07 RX ADMIN — POLYETHYLENE GLYCOL 3350 34 G: 17 POWDER, FOR SOLUTION ORAL at 01:38

## 2024-05-07 RX ADMIN — WATER 2000 MG: 1 INJECTION INTRAMUSCULAR; INTRAVENOUS; SUBCUTANEOUS at 01:01

## 2024-05-07 RX ADMIN — BUDESONIDE 1 MG: 1 SUSPENSION RESPIRATORY (INHALATION) at 07:26

## 2024-05-07 RX ADMIN — Medication 16 MCG/MIN: at 10:51

## 2024-05-07 RX ADMIN — METRONIDAZOLE 500 MG: 500 INJECTION, SOLUTION INTRAVENOUS at 15:40

## 2024-05-07 RX ADMIN — METRONIDAZOLE 500 MG: 500 INJECTION, SOLUTION INTRAVENOUS at 08:00

## 2024-05-07 RX ADMIN — HEPARIN SODIUM 5000 UNITS: 5000 INJECTION INTRAVENOUS; SUBCUTANEOUS at 14:00

## 2024-05-07 RX ADMIN — DOCUSATE SODIUM LIQUID 100 MG: 100 LIQUID ORAL at 01:38

## 2024-05-07 RX ADMIN — ARFORMOTEROL TARTRATE 15 MCG: 15 SOLUTION RESPIRATORY (INHALATION) at 19:31

## 2024-05-07 RX ADMIN — POLYETHYLENE GLYCOL 3350 34 G: 17 POWDER, FOR SOLUTION ORAL at 22:40

## 2024-05-07 RX ADMIN — CHLORHEXIDINE GLUCONATE 15 ML: 1.2 RINSE ORAL at 07:59

## 2024-05-07 RX ADMIN — ARFORMOTEROL TARTRATE 15 MCG: 15 SOLUTION RESPIRATORY (INHALATION) at 07:25

## 2024-05-07 RX ADMIN — PIPERACILLIN AND TAZOBACTAM 3375 MG: 3; .375 INJECTION, POWDER, FOR SOLUTION INTRAVENOUS at 21:34

## 2024-05-07 RX ADMIN — BUDESONIDE 1 MG: 1 SUSPENSION RESPIRATORY (INHALATION) at 19:31

## 2024-05-07 RX ADMIN — DEXMEDETOMIDINE HYDROCHLORIDE 0.2 MCG/KG/HR: 4 INJECTION, SOLUTION INTRAVENOUS at 00:24

## 2024-05-07 RX ADMIN — METRONIDAZOLE 500 MG: 500 INJECTION, SOLUTION INTRAVENOUS at 01:33

## 2024-05-07 RX ADMIN — DOCUSATE SODIUM LIQUID 100 MG: 100 LIQUID ORAL at 07:59

## 2024-05-07 RX ADMIN — POLYETHYLENE GLYCOL 3350 34 G: 17 POWDER, FOR SOLUTION ORAL at 07:59

## 2024-05-07 RX ADMIN — IPRATROPIUM BROMIDE AND ALBUTEROL SULFATE 1 DOSE: .5; 3 SOLUTION RESPIRATORY (INHALATION) at 07:25

## 2024-05-07 RX ADMIN — HEPARIN SODIUM 5000 UNITS: 5000 INJECTION INTRAVENOUS; SUBCUTANEOUS at 22:40

## 2024-05-07 RX ADMIN — DEXMEDETOMIDINE HYDROCHLORIDE 0.6 MCG/KG/HR: 4 INJECTION, SOLUTION INTRAVENOUS at 21:40

## 2024-05-07 RX ADMIN — CEFEPIME 1000 MG: 1 INJECTION, POWDER, FOR SOLUTION INTRAMUSCULAR; INTRAVENOUS at 12:00

## 2024-05-07 RX ADMIN — DEXMEDETOMIDINE HYDROCHLORIDE 0.6 MCG/KG/HR: 4 INJECTION, SOLUTION INTRAVENOUS at 10:55

## 2024-05-07 RX ADMIN — THIAMINE HYDROCHLORIDE 100 MG: 100 INJECTION, SOLUTION INTRAMUSCULAR; INTRAVENOUS at 07:59

## 2024-05-07 RX ADMIN — HEPARIN SODIUM 5000 UNITS: 5000 INJECTION INTRAVENOUS; SUBCUTANEOUS at 05:00

## 2024-05-07 RX ADMIN — CHLORHEXIDINE GLUCONATE 15 ML: 1.2 RINSE ORAL at 22:41

## 2024-05-07 RX ADMIN — FAMOTIDINE 20 MG: 10 INJECTION INTRAVENOUS at 08:00

## 2024-05-07 RX ADMIN — PIPERACILLIN AND TAZOBACTAM 4500 MG: 4; .5 INJECTION, POWDER, FOR SOLUTION INTRAVENOUS at 12:55

## 2024-05-07 RX ADMIN — DOCUSATE SODIUM LIQUID 100 MG: 100 LIQUID ORAL at 22:40

## 2024-05-07 ASSESSMENT — PULMONARY FUNCTION TESTS
PIF_VALUE: 14
PIF_VALUE: 15
PIF_VALUE: 14
PIF_VALUE: 14
PIF_VALUE: 20
PIF_VALUE: 15
PIF_VALUE: 14

## 2024-05-07 NOTE — PROGRESS NOTES
Pt remains intubated and sedated. No signs of distress noted. Will continue to monitor and assess Respiratory Vitals.SBT will be attemped once pt's sedatoion is lessened.    05/07/24 0727   Patient Observation   Pulse 97   Respirations 27   SpO2 99 %   Breath Sounds   Breath Sounds Bilateral Diminished   Right Upper Lobe Diminished   Left Upper Lobe Diminished   Vent Information   Ventilator Day(s) 2   Ventilator ID [id number]   Vent Mode AC/PRVC   Ventilator Settings   FiO2  100 %   Insp Time (sec) 0.8 sec   Resp Rate (Set) 22 bpm   PEEP/CPAP (cmH2O) 8   Target Vt 500   Vent Patient Data (Readings)   Vt Mandatory Exp (mL) 569 mL   Vt (Measured) 569 mL   Peak Inspiratory Pressure (cmH2O) 14 cmH2O   Rate Measured 27 br/min   Minute Volume (L/min) 15.4 Liters   Mean Airway Pressure (cmH2O) 11 cmH20   Plateau Pressure (cm H2O) 9.3 cm H2O   Driving Pressure 1.3   Inspiratory Time 0.8 sec   I:E Ratio 1:1.7   Flow Sensitivity 4 L/min   I Time/ I Time % 0.8 s   Insp Rise Time (%) 50 %   Backup Apnea On   Backup Rate 22 Breaths Per Minute   Backup Vt 460   Vent Alarm Settings   Low Pressure (cmH2O) 13.5 cmH2O   High Pressure (cmH2O) 40 cmH2O   Low Minute Volume (lpm) 2 L/min   High Minute Volume (lpm) 25.5 L/min   Low Exhaled Vt (ml) 200 mL   High Exhaled Vt (ml) 800 mL   RR High (bpm) 50 br/min   Apnea (secs) 20 secs   Additional Respiratoray Assessments   Humidification Source Heated wire   Humidification Temp 7   Circuit Condensation Drained   Airway Clearance   Suction ET Tube   Subglottic Suction Done Yes   Suction Device Inline suction catheter   Sputum Method Obtained Endotracheal   Sputum Amount Moderate   Sputum Color/Odor Tan   Sputum Consistency Thin;Thick   $Obtained Sample $Induced Sputum (charge not used for Bronchoscopy)   ETT    Placement Date/Time: 05/06/24 1841   Present on Admission/Arrival: No  Placed By: In ED;Licensed provider  Placement Verified By: Auscultation;Capnometry;Colorimetric ETCO2

## 2024-05-07 NOTE — CARE COORDINATION
Initial Assessment    05/07/24 1133   Discharge Planning   Living Arrangements Children   Support Systems Children;Family Members   Current DME Prior to Arrival Cane   Potential Assistance Needed N/A   Potential Assistance Purchasing Medications No   Potential DME Needed Other (Comment)   Type of Home Care Services None  (Pending PT/OT recs)   Patient expects to be discharged to: House         83 y.o. male with a PMHx of dementia, DM II, HLD, and  COPD who presented to [facility] ER with encephalopathy and hypoxia. SW presented to bedside however, no family present and pt intubated.    SW paced call to pts Primary decision maker/Dtr Aimee Crump [phone].introduced self and explained role. Pts dtr confirmed demographics on file. Pts dtr reported pt resides with her in a 2 level home with 2 steps to it entrance. Per pts dtr, the pt sleeps on the main level of the home.     Pts dtr reported that pt ambulated with a cane and required hand held assistance when ambulating. Pts dtr reported that pt always walks with assistance and requires 24 hr supervision. Pts dtr reported her sister recently moved to [facility] to assist her.Pts dtr reported she also cares for her adult special needs dtr.    Pts dtr reported pt is connected to the [facility] program and receives a RN visit [facility] for an hour to assist pt in getting ready for his Day program that he attends Mon-Fri. Pts dtr reported pt requires assistance with all ADLs and stated pt has a shower chair.     Pts dtr reported she will provide pt with transport at the time of discharge and is amenable to HH if recommended. Pts dtr reported no preference in a HH agency.     Dispo is TBD, PT/OT recs to follow.     SW will continue to follow.    MORE Bae  Case Management Department

## 2024-05-07 NOTE — CONSULTS
Comprehensive Nutrition Assessment    Type and Reason for Visit:  Initial, Consult, NPO/Clear Liquid    Nutrition Recommendations/Plan:   Continue NPO status for today.  Continue to monitor readiness for EN, weight, labs, and plan of care during admission.      Malnutrition Assessment:  Malnutrition Status:  Insufficient data (vs at risk - NPO, vent) (05/07/24 1115)    Context:  Acute Illness       Nutrition History and Allergies:   PMHx: dementia, DM II, HLD, and COPD. Wt hx: 151 lb (9/8/22), 174 lb (11/3/22), 174 lb (12/1/22), 165 lb (5/6/24) - estimated, bed scale taken this date 161 lb. Limited hx available. Limited visual NPFE conducted. NKFA per chart.    Nutrition Assessment:    Presented with encephalopathy and hypoxia; resp failure and septic/hypovolemic shock. Emergently intubated 5/6 and transferred to ICU. Consult noted for TF ordering and management. Discussed care with ICU NP. To continue NPO status today due to pressor, lactate trending up.    Nutrition Related Findings:    Last BM (including prior to admit):  (PTA)  Pertinent Meds: cefepime, colace, pepcid, glycolax, thiamine, precedex gtt, fentanyl gtt, levo @ 15 mcg/min Pertinent Labs: BUN/Cr 25/2.23, GFR 29, Lactic acid 5.2 H (trending up) Wound Type: None       Current Nutrition Intake & Therapies:    Average Meal Intake: NPO     Diet NPO    Anthropometric Measures:  Height: 180.3 cm (5' 11\")  Ideal Body Weight (IBW): 172 lbs (78 kg)    Admission Body Weight: 74.8 kg (165 lb) (estimated)  Current Body Weight: 73.2 kg (161 lb 6 oz) (taken by RD), 93.8 % IBW.    Current BMI (kg/m2): 22.5  BMI Categories: Normal Weight (BMI 22.0 to 24.9) age over 65    Estimated Daily Nutrient Needs:  Energy Requirements Based On: Formula  Weight Used for Energy Requirements: Current  Energy (kcal/day): 6545-7322 ([facility] 2003b x 0.9-1.1)  Weight Used for Protein Requirements: Current  Protein (g/day):  (1.2-2)  Method Used for Fluid Requirements: 1

## 2024-05-07 NOTE — PROGRESS NOTES
Assumed care of patient fri dayshift Rt.  Nurse was in the process of making adjustments to sedation as patient was extremely agitated.       05/06/24 1945   Patient Observation   Pulse (!) 110   Respirations (!) 34   SpO2 98 %   Vent Information   Ventilator Day(s) 1   Ventilator ID [id number]   Ventilator Safety Check Performed Pre-Use Yes   Ventilator Initiate Yes   Vent Mode AC/PRVC   Ventilator Settings   FiO2  100 %   Insp Time (sec) 0 sec   Resp Rate (Set) 22 bpm   PEEP/CPAP (cmH2O) 5   Target Vt 450   Peak Inspiratory Flow (Set) 0.8 L/sec   Vent Patient Data (Readings)   Vt (Measured) 600 mL   Peak Inspiratory Pressure (cmH2O) 14 cmH2O   Rate Measured 35 br/min   Minute Volume (L/min) 21.6 Liters   Mean Airway Pressure (cmH2O) 12 cmH20   I:E Ratio 1:1.2   Flow Sensitivity 4 L/min   Disconnect Sensitivity (%) 75 %   Expiratory Sensitivity (%) 25 %   I Time/ I Time % 50 s   Backup Apnea On   Backup Rate 22 Breaths Per Minute   Backup Vt 450   Backup I Time 1   Vent Alarm Settings   Low Pressure (cmH2O) 13.5 cmH2O   High Pressure (cmH2O) 40 cmH2O   Low Minute Volume (lpm) 2 L/min   High Minute Volume (lpm) 20 L/min   Low Exhaled Vt (ml) 200 mL   High Exhaled Vt (ml) 800 mL   RR High (bpm) 40 br/min   Apnea (secs) 20 secs   Additional Respiratoray Assessments   Humidification Source Heated wire   Humidification Temp 36   Circuit Condensation Drained   Ambu Bag With Mask At Bedside Yes   Airway Clearance   Suction ET Tube   Subglottic Suction Done No   Suction Device Inline suction catheter   Sputum Method Obtained Endotracheal   Sputum Amount Moderate   Sputum Color/Odor Tan   Sputum Consistency Thick   ETT    Placement Date/Time: 05/06/24 1841   Present on Admission/Arrival: No  Placed By: In ED;Licensed provider  Placement Verified By: Auscultation;Capnometry;Colorimetric ETCO2 device;Direct visualization  Preoxygenation: Yes  Mask Ventilation: Ventilated...   Secured At 24 cm   Measured From Lips   ETT

## 2024-05-07 NOTE — PROGRESS NOTES
[facility]   Pharmacy Pharmacokinetic Monitoring Service - Vancomycin     Paul Bobby is a 83 y.o. male starting on vancomycin therapy for Sepsis of Unknown Etiology. Pharmacy consulted for monitoring and adjustment.    Target Concentration: Dosing based on anticipated concentration <15 mg/L due to renal impairment/insufficiency    Additional Antimicrobials: Cefepime    Pertinent Laboratory Values:   Temp: 97.5 °F (36.4 °C), Weight - Scale: 74.8 kg (165 lb)  Recent Labs     05/06/24  1727 05/06/24  1800   CREATININE 1.41* 1.85*   BUN  --  20*   WBC  --  7.5   PROCAL  --  <0.05     Estimated Creatinine Clearance: 32 mL/min (A) (based on SCr of 1.85 mg/dL (H)).    Pertinent Cultures:  Culture Date Source Results   05/06 Blood Pending   MRSA Nasal Swab: N/A. Non-respiratory infection    Plan:  Concentration-guided dosing due to renal impairment/insufficiency  Start vancomycin 1750 mg IV x 1  Will dose according to vancomycin concentration levels at this time due to renal insufficiency  Renal labs as indicated   Vancomycin concentration not ordered at this time  Pharmacy will continue to monitor patient and adjust therapy as indicated    Thank you for the consult,  ALLA DAMICO [facility]  5/6/2024

## 2024-05-07 NOTE — CONSULTS
Palliative Medicine Consult  [facility]: [phone]  [facility]: [phone]     Patient Name: Paul Bobby  YOB: 1940    Date of Initial Consult: 5/7/24  Reason for Consult: goals of care discussion/ACP/symptoms management  Requesting Provider:  Renetta Sanchez, OBED - NP   Primary Care Physician: Pamela Rodrigues MD      SUMMARY:     Paul Bobby is a 83 y.o. with a past history of COPD, diabetes, hyperlipidemia, and dementia, who was admitted on 5/6/2024 from [place] [facility] with a diagnosis of acute hypoxic respiratory failure, aspiration pneumonia in setting of parainfluenza 3.  Patient was initially brought to emergency room for further evaluation of persistent nausea and vomiting that started at [facility].  Initially patient was placed on NRB followed by BiPAP due to acute hypoxic respiratory failure.  Due to patient's mental function and an episode of vomiting while on BiPAP, patient was subsequently intubated and transferred to ICU.  Patient is currently on IV pressors and IV antibiotics. Current medical issues leading to Palliative Medicine involvement include: To establish goals of care.    5/7/24: Patient was seen in presence of palliative care staff member.  Patient is currently sedated.  He is on FiO2 of 100% and PEEP of 8.  He is also on IV pressor.  No family members are available at bedside currently.     HISTORY:     History obtained from: Medical records    CHIEF COMPLAINT: Nausea and vomiting    HPI/SUBJECTIVE:    The patient is:   [] Verbal and participatory  [x] Non-participatory due to:   Intubated and sedated     PHYSICAL EXAM:     From RN flowsheet:  Wt Readings from Last 3 Encounters:   05/06/24 74.8 kg (165 lb)   12/01/22 78.9 kg (174 lb)   11/03/22 79 kg (174 lb 2.6 oz)       /68   Pulse 89   Temp 98.2 °F (36.8 °C) (Oral)   Resp 26   Ht 1.803 m (5' 11\")   Wt 74.8 kg (165 lb)   SpO2 100%   BMI 23.01 kg/m²  are noted above in HPI.  The following systems were [] reviewed / [x] unable to be reviewed as noted in HPI  Other findings are noted below.  Systems: constitutional, ears/nose/mouth/throat, respiratory, gastrointestinal, genitourinary, musculoskeletal, integumentary, neurologic, psychiatric, endocrine. Positive findings noted below.    Modified ESAS Completed by: provider                                              HISTORY:     Past Medical History:   Diagnosis Date    Dementia (HCC)     Hyperlipidemia     T2DM (type 2 diabetes mellitus) (HCC)       Past Surgical History:   Procedure Laterality Date    PROSTATECTOMY      prostate cancer      No family history on file.   History reviewed, no pertinent family history.  Social History     Tobacco Use    Smoking status: Former     Current packs/day: 0.00     Types: Cigarettes     Quit date: 1980     Years since quittin.3    Smokeless tobacco: Never   Substance Use Topics    Alcohol use: Never     Allergies   Allergen Reactions    Amoxicillin Diarrhea and Nausea And Vomiting      Current Facility-Administered Medications   Medication Dose Route Frequency    arformoterol tartrate (BROVANA) nebulizer solution 15 mcg  15 mcg Nebulization BID RT    budesonide (PULMICORT) nebulizer suspension 1 mg  1 mg Nebulization BID    VASOpressin 20 Units in sodium chloride 0.9 % 100 mL infusion  0.03 Units/min IntraVENous Continuous    piperacillin-tazobactam (ZOSYN) 3,375 mg in sodium chloride 0.9 % 50 mL IVPB (mini-bag)  3,375 mg IntraVENous Q8H    chlorhexidine (PERIDEX) 0.12 % solution 15 mL  15 mL Mouth/Throat BID    famotidine (PEPCID) 20 mg in sodium chloride (PF) 0.9 % 10 mL injection  20 mg IntraVENous Daily    norepinephrine (LEVOPHED) 16 mg in sodium chloride 0.9 % 250 mL infusion  1-100 mcg/min IntraVENous Continuous    fentaNYL (SUBLIMAZE) infusion 1000 mcg/100mL   mcg/hr IntraVENous Continuous    fentaNYL (SUBLIMAZE) injection 50 mcg  50 mcg

## 2024-05-07 NOTE — ED NOTES
Assumed care of pt. Pt was brought in by EMS due to AMS and hypoxia. Pt intubated. Pt has central line. Dietrich placed.

## 2024-05-07 NOTE — PROGRESS NOTES
[facility].  Pulmonary, Critical Care, and Sleep Medicine    Name: Paul Bobby MRN: [id number]   : 1940 Hospital: [facility]   Date: 2024  Admission Date: 2024     Chart and notes reviewed. Data reviewed. I have evaluated all findings.    [x]I have reviewed the flowsheet and previous day’s notes.    [x]The patient is unable to give any meaningful history or review of systems because the patient is:  [x]Intubated [x]Sedated   []Unresponsive      [x]The patient is critically ill on      [x]Mechanical ventilation [x]Pressors   []BiPAP []         Interval HPI:]  Patient is a 83 y.o. male with a PMHx of dementia, DM II, HLD, and  COPD who presented to [facility] ER with encephalopathy and hypoxia. Per EMS, patient PACE and was evaluated by the nurse practitioner who found him altered and vomiting. She was concerned he aspirated on his lunch and called EMS. On arrival to ER, he was on NRB with oxygen saturation In the 70's and tachypneic. Patient was initially placed on Bipap by RT with little improvement of oxygenation. ABG was done and demonstrated metabolic acidosis, no hypercapnia. D/w ED physician, patient was placed on Bipap without medical update that patient had been vomiting prior to arrival. Update was later provided by the daughter and patient was removed from Bipap.  Decision was made to emergently intubate patient, however, prior to intubation patient suffered from large vomiting episode with inability to clear secretions. Apparently patient was not on Bipap at this time, unclear when patient was taken off. Patient continued with respiratory distress and lethargy, so patient was emergently intubated. Patient became hypotensive, refractory to IVF resuscitation and CVL was placed.     Subjective 24  Hospital Day: 24   Vent Day: 24   Overnight events: No acute events overnight   Mentation/Activity: sedated, withdraws to pain and facial grimacing  extensive along with food-corn, lodged in right lower lobe.  Patient will need repeat bronchoscopy to ensure clearance, started on airway clearance with MetaNeb and hypertonic saline.  Given poor functional status and ongoing aspiration, with severe toxic/metabolic encephalopathy, patient has high risk for recurrence and I recommend hospice/comfort measures--palliative care consulted.  Patient also has ESTRELLA, secondary to prerenal azotemia versus ischemic ATN.  Patient also found to have parainfluenza positive, however pneumonia secondary to aspiration pneumonia.    Acute prognosis is poor, long-term prognosis is very poor given underlying dementia with severe aspiration pneumonia      Remi Mendez MD/MPH     Pulmonary, Critical Care Medicine  [facility]

## 2024-05-07 NOTE — H&P
[facility]  Pulmonary, Critical Care, and Sleep Medicine    Name: Paul Bobby MRN: [id number]   : 1940 Hospital: [facility]   Date: 2024        Critical Care History and Physical      IMPRESSION:   Acute Hypoxic Respiratory Failure- requiring mechanical ventilator, in the setting multifocal PNA and parainfluenza 3   Aspiration PNA- multiple vomiting episodes preceding encephalopathy and placed on Bipap following   Parainfluenza 3   Acute Encephalopathy-toxic/metabolic in nature superimposed on dementia   Metabolic acidosis, lactic acidosis   ESTRELLA- prerenal azotemia vs. Ishemic ATN   Septic shock vs. hypovolemic- in the setting of multifocal PNA with partial b/l LL collapse and consildation/Aspiration PNA  Chronic bronchitis and emhpysema with distal mucoid impaction   COPD- not in acute exacerbation   Moderate colonic stool burden with large rectal stool ball   cholelithiasis without evidence of acute cholecystitis   DM II   HLD      Patient Active Problem List   Diagnosis    Weakness    Syncope    Dementia (HCC)    AMS (altered mental status)        RECOMMENDATIONS:   Neuro: Continue Fentanyl and switch Propofol gtt to Precedex gtt due to hypotension, Titrate sedation to RASS of 0 to -1. PRN for breakthrough sedation needs.   Pulm: Titrate FiO2 for goal SPO2> 90%,VAP prevention bundle, head of the bed at 30' all times.Daily sedation holiday and assessment for weaning with SBT as tolerated.    PRN duonebs. Continue steroids, pulmonary hygiene care, Aspiration precautions, Keep HOB >30 degrees  CVS : Continue Levophed, Actively titrate vasopressors aim MAP >65mmHg, Check cardiac panel, ECHO results.   GI: SUP, Trend LFTs, Zofran PRN for N/V, Diet/NP, OGT to LIWS, start high dose Miralax and colace for constipation   Renal:  Trend Renal indices, Strict Is/Os, Dietrich (+)  Hem/Onc: Monitor for s/o active bleeding.   I/D:Sepsis bundle per hospital protocol, Blood,

## 2024-05-07 NOTE — PROGRESS NOTES
Found RR at 22 BPM. These settings were not changed by an RT nor was an RT notified. Changed rate back to 16.   05/07/24 1158   Patient Observation   Pulse 89   Respirations 26   SpO2 100 %   Breath Sounds   Breath Sounds Bilateral Diminished   Right Upper Lobe Diminished   Left Upper Lobe Diminished   Ventilator Settings   FiO2  100 %   Insp Time (sec) 0.8 sec   Resp Rate (Set) 16 bpm   Set Pressure (S)  22  (found rate adjusted changed back to 16)   PEEP/CPAP (cmH2O) 8   Target Vt 500   Vent Patient Data (Readings)   Vt Mandatory Exp (mL) 576 mL   Vt (Measured) 570 mL   Peak Inspiratory Pressure (cmH2O) 14 cmH2O   Rate Measured 29 br/min   Minute Volume (L/min) 14.2 Liters   Mean Airway Pressure (cmH2O) 11 cmH20   Plateau Pressure (cm H2O) 13 cm H2O   Driving Pressure 5   Inspiratory Time 0.8 sec   I:E Ratio 1;1.8   Flow Sensitivity 4 L/min   I Time/ I Time % 0.8 s   Insp Rise Time (%) 50 %   Backup Apnea On   Backup Rate 22 Breaths Per Minute

## 2024-05-07 NOTE — PROGRESS NOTES
Pharmacy Note     Cefepime 2,000mg q12h ordered for treatment of sepsis. Per [facility] Policy, cefepime will be changed to 1,000 mg q12h.     Estimated Creatinine Clearance: Estimated Creatinine Clearance: 27 mL/min (A) (based on SCr of 2.23 mg/dL (H)).  Dialysis Status, ESTRELLA, CKD: -  BMI:  Body mass index is 23.01 kg/m².    Rationale for Adjustment:  [facility] renal dosing policy.    Pharmacy will continue to monitor and adjust dose as necessary.      Please call with any questions.    Thank you,  FRANNY RUTHERFORD, [facility]

## 2024-05-07 NOTE — PROGRESS NOTES
Pharmacy Note     Cefepime 2gm IV q8h ordered for treatment of sepsis. Per [facility] Policy, Cefepime will be changed to 2 gm IV push followed by 2gm IV q12h extended infusion.     Estimated Creatinine Clearance: Estimated Creatinine Clearance: 32 mL/min (A) (based on SCr of 1.85 mg/dL (H)).  Dialysis Status, ESTRELLA, CKD: ESTRELLA    BMI:  Body mass index is 23.01 kg/m².    Rationale for Adjustment:  [facility] B-Lactam extended infusion policy    Pharmacy will continue to monitor and adjust dose as necessary.      Please call with any questions.    Thank you,  ALLA DAMICO, [facility]

## 2024-05-07 NOTE — PROGRESS NOTES
attempted to visit  with Paul Bobby, who is a 83 y.o., male.   The reason patientcame to hospital is:   Patient Active Problem List    Diagnosis Date Noted    AMS (altered mental status) 05/06/2024    Acute respiratory failure (HCC) 05/06/2024    Dementia (HCC) 11/04/2022    Syncope 11/01/2022    Weakness 10/31/2022   .    Patient is unable to communicate at this time. Patient intubated.  offered silent prayer near bedside.  Chaplains will continue to follow and will provide pastoral care on an as needed/requested basis.     Kb Zepeda   Staff    Spiritual Health   [phone]

## 2024-05-07 NOTE — PROGRESS NOTES
VENTILATOR CARE PLAN    Problem: Ventilator Management  Goal: *Adequate oxygenation/ ventilation/ and extubation      Patient:        Paul Bobby     83 y.o.   male     5/7/2024  5:13 AM  Patient Active Problem List   Diagnosis    Weakness    Syncope    Dementia (HCC)    AMS (altered mental status)    Acute respiratory failure (HCC)       Acute respiratory failure (HCC) [J96.00]  Altered mental status, unspecified altered mental status type [R41.82]  Acute hypoxemic respiratory failure (HCC) [J96.01]  AMS (altered mental status) [R41.82]    Reason patient intubated:Acute respiratory failure (HCC) [J96.00]  Altered mental status, unspecified altered mental status type [R41.82]  Acute hypoxemic respiratory failure (HCC) [J96.01]  AMS (altered mental status) [R41.82]    Ventilator day:2    Ventilator settings:500/22/+8/100%    ETT Size/Placement:8.0, 24@lip     Wean Screen Pass (Yes or No):  Wean Screen Reason for Failure:  Duration of Weaning Trial:  Additional Comments:        PLAN OF CARE:       GOAL:      OUTCOME:     ABG:  Date:5/7/2024  @OBBCZKXK82(ph 7.353,phi,pco2 35.1,pco2i,po2 66.2,po2i,hco3 19.5,hco3i,fio2 100%,fio2i)@    Chest X-ray:  Date:5/7/2024  @BSHSILASTIMGCAT(MIE3826:1)@    Lab Test:  Date:5/7/2024  WBC:   Lab Results   Component Value Date/Time    WBC 7.5 05/06/2024 06:00 PM   HGB:   Lab Results   Component Value Date/Time    HGB 14.6 05/06/2024 06:00 PM    PLTS:   Lab Results   Component Value Date/Time     05/06/2024 06:00 PM         Vital Signs:   Patient Vitals for the past 8 hrs:   Pulse Resp BP SpO2   05/07/24 0424 89 21 -- 94 %   05/07/24 0300 88 25 (!) 87/56 93 %   05/07/24 0200 95 23 95/64 93 %   05/07/24 0100 (!) 102 22 113/76 --   05/07/24 0059 (!) 101 28 -- (!) 25 %   05/06/24 2335 (!) 103 22 111/66 --   05/06/24 2330 (!) 110 22 127/85 92 %   05/06/24 2325 (!) 104 22 97/64 94 %   05/06/24 2320 (!) 103 22 95/61 94 %   05/06/24 2315 (!) 104 22 94/61 94 %   05/06/24 2310 (!)  103 22 (!) 78/56 94 %   05/06/24 2305 (!) 103 22 (!) 89/65 94 %   05/06/24 2300 (!) 103 22 (!) 85/61 95 %   05/06/24 2255 (!) 104 22 (!) 91/59 94 %   05/06/24 2250 (!) 104 22 (!) 86/63 94 %   05/06/24 2245 (!) 104 22 (!) 89/64 94 %   05/06/24 2240 (!) 103 22 95/64 94 %   05/06/24 2235 (!) 105 22 95/65 95 %   05/06/24 2230 (!) 105 22 97/62 95 %   05/06/24 2225 (!) 104 22 102/65 96 %   05/06/24 2155 92 22 95/64 98 %   05/06/24 2150 (!) 102 22 97/65 99 %   05/06/24 2145 (!) 105 22 (!) 75/48 97 %   05/06/24 2140 (!) 107 23 (!) 70/49 95 %   05/06/24 2135 99 24 (!) 66/45 95 %   05/06/24 2130 (!) 108 25 (!) 68/47 95 %   05/06/24 2125 (!) 108 26 (!) 70/49 95 %   05/06/24 2120 (!) 108 27 (!) 67/40 96 %   05/06/24 2115 (!) 111 27 (!) 75/51 97 %

## 2024-05-07 NOTE — ED PROVIDER NOTES
[facility] EMERGENCY DEPT  EMERGENCY DEPARTMENT ENCOUNTER      Pt Name: Paul Bobby  MRN: [id number]  Birthdate 1940  Date of evaluation: 5/6/2024  Provider: Armand Wilkerson MD  11:45 PM    CHIEF COMPLAINT       Chief Complaint   Patient presents with    Altered Mental Status         HISTORY OF PRESENT ILLNESS    Paul Bobby is a 83 y.o. male who presents to the emergency department      Patient arrived via EMS with complaint of altered mental status, hypoxia.  Per EMS the patient was at [facility] today when he was evaluated by the nurse practitioner for altered mental status and respiratory distress.  EMS notes he has a history of COPD, but no other history was available at the time.  Patient was altered upon arrival, very hypoxic in the mid 70s on nonrebreather, tachypneic but otherwise hemodynamically stable.          Nursing Notes were reviewed.    REVIEW OF SYSTEMS       Review of Systems    Except as noted above the remainder of the review of systems was reviewed and negative.       PAST MEDICAL HISTORY     Past Medical History:   Diagnosis Date    Dementia (HCC)     Hyperlipidemia     T2DM (type 2 diabetes mellitus) (HCC)          SURGICAL HISTORY       Past Surgical History:   Procedure Laterality Date    PROSTATECTOMY  2002    prostate cancer         CURRENT MEDICATIONS       Previous Medications    ACETAMINOPHEN (TYLENOL) 325 MG TABLET    Take 650 mg by mouth every 4 hours as needed    ALUMINUM & MAGNESIUM HYDROXIDE-SIMETHICONE (MAALOX) 200-200-20 MG/5ML SUSP SUSPENSION    Take 30 mLs by mouth every 4 hours as needed    ASPIRIN 81 MG CHEWABLE TABLET    Take 81 mg by mouth daily    ATORVASTATIN (LIPITOR) 10 MG TABLET    Take 10 mg by mouth    BISACODYL (DULCOLAX) 10 MG SUPPOSITORY    Place 10 mg rectally daily as needed    CITALOPRAM (CELEXA) 20 MG TABLET    Take 20 mg by mouth daily    CLOBETASOL (TEMOVATE) 0.05 % OINTMENT    Apply topically 2 times daily    CYANOCOBALAMIN 1000 MCG TABLET    Take 1,000  appropriately, he was not on BiPAP at this time.  He was placed on his right side and suction to help clear the secretions.    Further information was obtained at this time from the patient's daughter who came to the bedside, she notes that he is a relatively healthy man despite his dementia and some behavioral health problems, he has been having a cough over the last few days and went to [facility] this morning with this continued cough.  She was called by the nurse practitioner from [facility] and the nurse practitioner said that the patient had vomited while eating lunch and she was concerned that he had aspirated his lunch and was calling the EMS to bring him to the hospital.    Amount and/or Complexity of Data Reviewed  Labs: ordered.  Radiology: ordered.  ECG/medicine tests: ordered.    Risk  Prescription drug management.  Decision regarding hospitalization.            REASSESSMENT          CRITICAL CARE TIME   I have spent 65 minutes of critical care time involved in lab review, consultations with specialist, family decision-making, and documentation.  During this entire length of time I was immediately available to the patient.       Critical Care:  The reason for providing this level of medical care for this critically ill patient was due a critical illness that impaired one or more vital organ systems including cardiovascular, pulmonary, central nervous systems such that there was a high probability of imminent or life threatening deterioration in the patients condition. This care involved high complexity decision making to assess, manipulate, and support vital system functions, to treat this vital organ system failure and to prevent further life threatening deterioration of the patient’s condition.     Aggregate critical care time is exclusive of any separately billable procedures and teaching time.    Armand Wilkerson MD      CONSULTS:  IP CONSULT TO DIETITIAN  IP CONSULT TO PHARMACY    PROCEDURES:  Unless otherwise

## 2024-05-07 NOTE — PROGRESS NOTES
PT REMAINS ON VENT WILL CONTINUE TO MONITOR AND ASSESS RESPIRATORY VITALS   05/07/24 1556   Patient Observation   Pulse 84   Respirations 25   SpO2 99 %   Breath Sounds   Breath Sounds Bilateral Diminished   Ventilator Settings   FiO2  100 %   Insp Time (sec) 0.8 sec   Resp Rate (Set) 22 bpm   PEEP/CPAP (cmH2O) 8   Target Vt 500   Vent Patient Data (Readings)   Vt Spont (mL) 595 mL   Vt Mandatory Exp (mL) 595 mL   Vt (Measured) 547 mL   Peak Inspiratory Pressure (cmH2O) 14 cmH2O   Rate Measured 24 br/min   Minute Volume (L/min) 14.5 Liters   Mean Airway Pressure (cmH2O) 11 cmH20   Plateau Pressure (cm H2O) 18 cm H2O   Driving Pressure 10   I:E Ratio 1:1.8   Flow Sensitivity 4 L/min   Insp Rise Time (%) 50 %   Backup Apnea On   Backup Rate 22 Breaths Per Minute   Backup Vt 460   Airway Clearance   Suction ET Tube   Subglottic Suction Done Yes   Suction Device Inline suction catheter   Sputum Method Obtained Endotracheal   Sputum Amount Moderate   Sputum Color/Odor Tan   Sputum Consistency Thin;Thick   ETT    Placement Date/Time: 05/06/24 1841   Present on Admission/Arrival: No  Placed By: In ED;Licensed provider  Placement Verified By: Auscultation;Capnometry;Colorimetric ETCO2 device;Direct visualization  Preoxygenation: Yes  Mask Ventilation: Ventilated...   Secured At 24 cm   Measured From Lips   ETT Placement Center   Secured By Commercial tube haas   Site Assessment Cool;Dry   Cuff Pressure   (MLT)   Tie/Haas Changed No   Ventilator Associated Pneumonia Bundle   Elevation of Head of Bed to 30-45 Degrees  Yes

## 2024-05-08 ENCOUNTER — APPOINTMENT (OUTPATIENT)
Facility: HOSPITAL | Age: 84
End: 2024-05-08
Payer: MEDICARE

## 2024-05-08 PROBLEM — R65.21 SEPTIC SHOCK (HCC): Status: ACTIVE | Noted: 2024-05-08

## 2024-05-08 PROBLEM — E87.20 LACTIC ACIDOSIS: Status: ACTIVE | Noted: 2024-05-08

## 2024-05-08 PROBLEM — N17.9 AKI (ACUTE KIDNEY INJURY) (HCC): Status: ACTIVE | Noted: 2024-05-08

## 2024-05-08 PROBLEM — J18.9 MULTIFOCAL PNEUMONIA: Status: ACTIVE | Noted: 2024-05-08

## 2024-05-08 PROBLEM — E87.20 METABOLIC ACIDOSIS: Status: ACTIVE | Noted: 2024-05-08

## 2024-05-08 PROBLEM — A41.9 SEPTIC SHOCK (HCC): Status: ACTIVE | Noted: 2024-05-08

## 2024-05-08 LAB
ANION GAP SERPL CALC-SCNC: 7 MMOL/L (ref 3–18)
ARTERIAL PATENCY WRIST A: POSITIVE
BACTERIA SPEC CULT: ABNORMAL
BASE DEFICIT BLD-SCNC: 4.7 MMOL/L
BASOPHILS # BLD: 0.1 K/UL (ref 0–0.1)
BASOPHILS NFR BLD: 1 % (ref 0–2)
BDY SITE: ABNORMAL
BUN SERPL-MCNC: 24 MG/DL (ref 7–18)
BUN/CREAT SERPL: 14 (ref 12–20)
CALCIUM SERPL-MCNC: 8.8 MG/DL (ref 8.5–10.1)
CHLORIDE SERPL-SCNC: 110 MMOL/L (ref 100–111)
CO2 SERPL-SCNC: 21 MMOL/L (ref 21–32)
CREAT SERPL-MCNC: 1.69 MG/DL (ref 0.6–1.3)
DIFFERENTIAL METHOD BLD: ABNORMAL
ECHO AO ASC DIAM: 3.6 CM
ECHO AO ASCENDING AORTA INDEX: 1.88 CM/M2
ECHO AO ROOT DIAM: 3.5 CM
ECHO AO ROOT INDEX: 1.82 CM/M2
ECHO AV PEAK GRADIENT: 3 MMHG
ECHO AV PEAK VELOCITY: 0.8 M/S
ECHO BSA: 1.92 M2
ECHO EST RA PRESSURE: 10 MMHG
ECHO LA DIAMETER INDEX: 1.56 CM/M2
ECHO LA DIAMETER: 3 CM
ECHO LA TO AORTIC ROOT RATIO: 0.86
ECHO LA VOL A-L A4C: 32 ML (ref 18–58)
ECHO LA VOL MOD A4C: 30 ML (ref 18–58)
ECHO LA VOLUME INDEX A-L A4C: 17 ML/M2 (ref 16–34)
ECHO LA VOLUME INDEX MOD A4C: 16 ML/M2 (ref 16–34)
ECHO LV FRACTIONAL SHORTENING: 33 % (ref 28–44)
ECHO LV INTERNAL DIMENSION DIASTOLE INDEX: 1.72 CM/M2
ECHO LV INTERNAL DIMENSION DIASTOLIC: 3.3 CM (ref 4.2–5.9)
ECHO LV INTERNAL DIMENSION SYSTOLIC INDEX: 1.15 CM/M2
ECHO LV INTERNAL DIMENSION SYSTOLIC: 2.2 CM
ECHO LV IVSD: 1 CM (ref 0.6–1)
ECHO LV MASS 2D: 94.6 G (ref 88–224)
ECHO LV MASS INDEX 2D: 49.3 G/M2 (ref 49–115)
ECHO LV POSTERIOR WALL DIASTOLIC: 1 CM (ref 0.6–1)
ECHO LV RELATIVE WALL THICKNESS RATIO: 0.61
ECHO PV MAX VELOCITY: 0.8 M/S
ECHO PV PEAK GRADIENT: 3 MMHG
ECHO RA VOLUME BIPLANE METHOD OF DISKS: 47 ML
ECHO RA VOLUME INDEX BP: 24 ML/M2
ECHO RIGHT VENTRICULAR SYSTOLIC PRESSURE (RVSP): 34 MMHG
ECHO RV FREE WALL PEAK S': 10 CM/S
ECHO RV TAPSE: 1.6 CM (ref 1.7–?)
ECHO TV REGURGITANT MAX VELOCITY: 2.45 M/S
ECHO TV REGURGITANT PEAK GRADIENT: 24 MMHG
EOSINOPHIL # BLD: 0 K/UL (ref 0–0.4)
EOSINOPHIL NFR BLD: 0 % (ref 0–5)
ERYTHROCYTE [DISTWIDTH] IN BLOOD BY AUTOMATED COUNT: 14 % (ref 11.6–14.5)
GAS FLOW.O2 O2 DELIVERY SYS: ABNORMAL
GAS FLOW.O2 SETTING OXYMISER: 22 BPM
GLUCOSE BLD STRIP.AUTO-MCNC: 137 MG/DL (ref 70–110)
GLUCOSE BLD STRIP.AUTO-MCNC: 138 MG/DL (ref 70–110)
GLUCOSE BLD STRIP.AUTO-MCNC: 176 MG/DL (ref 70–110)
GLUCOSE SERPL-MCNC: 159 MG/DL (ref 74–99)
GRAM STN SPEC: ABNORMAL
GRAM STN SPEC: ABNORMAL
HCO3 BLD-SCNC: 19.1 MMOL/L (ref 22–26)
HCT VFR BLD AUTO: 39.5 % (ref 36–48)
HGB BLD-MCNC: 12.8 G/DL (ref 13–16)
IMM GRANULOCYTES # BLD AUTO: 0 K/UL (ref 0–0.04)
IMM GRANULOCYTES NFR BLD AUTO: 0 % (ref 0–0.5)
IPAP/PIP/HIGH PEEP: 22
LACTATE SERPL-SCNC: 3.1 MMOL/L (ref 0.4–2)
LACTATE SERPL-SCNC: 3.4 MMOL/L (ref 0.4–2)
LACTATE SERPL-SCNC: 3.8 MMOL/L (ref 0.4–2)
LACTATE SERPL-SCNC: 5.1 MMOL/L (ref 0.4–2)
LYMPHOCYTES # BLD: 1.1 K/UL (ref 0.9–3.6)
LYMPHOCYTES NFR BLD: 9 % (ref 21–52)
MAGNESIUM SERPL-MCNC: 1.8 MG/DL (ref 1.6–2.6)
MCH RBC QN AUTO: 30.3 PG (ref 24–34)
MCHC RBC AUTO-ENTMCNC: 32.4 G/DL (ref 31–37)
MCV RBC AUTO: 93.4 FL (ref 78–100)
METAMYELOCYTES NFR BLD MANUAL: 11 %
MONOCYTES # BLD: 0 K/UL (ref 0.05–1.2)
MONOCYTES NFR BLD: 0 % (ref 3–10)
MYELOCYTES NFR BLD MANUAL: 2 %
NEUTS BAND NFR BLD MANUAL: 28 %
NEUTS SEG # BLD: 9.1 K/UL (ref 1.8–8)
NEUTS SEG NFR BLD: 49 % (ref 40–73)
NRBC # BLD: 0 K/UL (ref 0–0.01)
NRBC BLD-RTO: 0 PER 100 WBC
O2/TOTAL GAS SETTING VFR VENT: 70 %
PAW @ MEAN EXP FLOW ON VENT: 14 CMH2O
PCO2 BLD: 31.1 MMHG (ref 35–45)
PEEP RESPIRATORY: 8 CMH2O
PH BLD: 7.4 (ref 7.35–7.45)
PHOSPHATE SERPL-MCNC: 2.8 MG/DL (ref 2.5–4.9)
PLATELET # BLD AUTO: 185 K/UL (ref 135–420)
PLATELET COMMENT: ABNORMAL
PMV BLD AUTO: 10.6 FL (ref 9.2–11.8)
PO2 BLD: 72 MMHG (ref 80–100)
POTASSIUM SERPL-SCNC: 4.7 MMOL/L (ref 3.5–5.5)
PROCALCITONIN SERPL-MCNC: 24.06 NG/ML
RBC # BLD AUTO: 4.23 M/UL (ref 4.35–5.65)
RBC MORPH BLD: ABNORMAL
RESPIRATORY RATE, POC: 28 (ref 5–40)
SAO2 % BLD: 94.5 % (ref 92–97)
SERVICE CMNT-IMP: ABNORMAL
SODIUM SERPL-SCNC: 138 MMOL/L (ref 136–145)
SPECIMEN TYPE: ABNORMAL
VANCOMYCIN SERPL-MCNC: 5.4 UG/ML (ref 5–40)
VENTILATION MODE VENT: ABNORMAL
VT SETTING VENT: 500 ML
WBC # BLD AUTO: 11.8 K/UL (ref 4.6–13.2)
WBC MORPH BLD: ABNORMAL

## 2024-05-08 PROCEDURE — 6360000002 HC RX W HCPCS: Performed by: EMERGENCY MEDICINE

## 2024-05-08 PROCEDURE — 80048 BASIC METABOLIC PNL TOTAL CA: CPT

## 2024-05-08 PROCEDURE — 6370000000 HC RX 637 (ALT 250 FOR IP): Performed by: EMERGENCY MEDICINE

## 2024-05-08 PROCEDURE — 80202 ASSAY OF VANCOMYCIN: CPT

## 2024-05-08 PROCEDURE — 2580000003 HC RX 258: Performed by: EMERGENCY MEDICINE

## 2024-05-08 PROCEDURE — 93306 TTE W/DOPPLER COMPLETE: CPT

## 2024-05-08 PROCEDURE — 31646 BRNCHSC W/THER ASPIR SBSQ: CPT | Performed by: INTERNAL MEDICINE

## 2024-05-08 PROCEDURE — 82962 GLUCOSE BLOOD TEST: CPT

## 2024-05-08 PROCEDURE — 2500000003 HC RX 250 WO HCPCS: Performed by: NURSE PRACTITIONER

## 2024-05-08 PROCEDURE — 87205 SMEAR GRAM STAIN: CPT

## 2024-05-08 PROCEDURE — 93306 TTE W/DOPPLER COMPLETE: CPT | Performed by: INTERNAL MEDICINE

## 2024-05-08 PROCEDURE — 6360000002 HC RX W HCPCS: Performed by: INTERNAL MEDICINE

## 2024-05-08 PROCEDURE — 83605 ASSAY OF LACTIC ACID: CPT

## 2024-05-08 PROCEDURE — 36592 COLLECT BLOOD FROM PICC: CPT

## 2024-05-08 PROCEDURE — 84145 PROCALCITONIN (PCT): CPT

## 2024-05-08 PROCEDURE — 2500000003 HC RX 250 WO HCPCS: Performed by: EMERGENCY MEDICINE

## 2024-05-08 PROCEDURE — 6360000002 HC RX W HCPCS: Performed by: NURSE PRACTITIONER

## 2024-05-08 PROCEDURE — 84100 ASSAY OF PHOSPHORUS: CPT

## 2024-05-08 PROCEDURE — 6370000000 HC RX 637 (ALT 250 FOR IP): Performed by: NURSE PRACTITIONER

## 2024-05-08 PROCEDURE — 94640 AIRWAY INHALATION TREATMENT: CPT

## 2024-05-08 PROCEDURE — 85025 COMPLETE CBC W/AUTO DIFF WBC: CPT

## 2024-05-08 PROCEDURE — 2700000000 HC OXYGEN THERAPY PER DAY

## 2024-05-08 PROCEDURE — 94003 VENT MGMT INPAT SUBQ DAY: CPT

## 2024-05-08 PROCEDURE — APPSS15 APP SPLIT SHARED TIME 0-15 MINUTES: Performed by: NURSE PRACTITIONER

## 2024-05-08 PROCEDURE — 36415 COLL VENOUS BLD VENIPUNCTURE: CPT

## 2024-05-08 PROCEDURE — 2580000003 HC RX 258: Performed by: INTERNAL MEDICINE

## 2024-05-08 PROCEDURE — 82803 BLOOD GASES ANY COMBINATION: CPT

## 2024-05-08 PROCEDURE — 99291 CRITICAL CARE FIRST HOUR: CPT | Performed by: INTERNAL MEDICINE

## 2024-05-08 PROCEDURE — 83735 ASSAY OF MAGNESIUM: CPT

## 2024-05-08 PROCEDURE — 6360000002 HC RX W HCPCS: Performed by: REGISTERED NURSE

## 2024-05-08 PROCEDURE — 36600 WITHDRAWAL OF ARTERIAL BLOOD: CPT

## 2024-05-08 PROCEDURE — 31624 DX BRONCHOSCOPE/LAVAGE: CPT | Performed by: INTERNAL MEDICINE

## 2024-05-08 PROCEDURE — 71045 X-RAY EXAM CHEST 1 VIEW: CPT

## 2024-05-08 PROCEDURE — 87040 BLOOD CULTURE FOR BACTERIA: CPT

## 2024-05-08 PROCEDURE — 2000000000 HC ICU R&B

## 2024-05-08 PROCEDURE — 2580000003 HC RX 258: Performed by: REGISTERED NURSE

## 2024-05-08 PROCEDURE — A4216 STERILE WATER/SALINE, 10 ML: HCPCS | Performed by: EMERGENCY MEDICINE

## 2024-05-08 PROCEDURE — 51702 INSERT TEMP BLADDER CATH: CPT

## 2024-05-08 PROCEDURE — 87070 CULTURE OTHR SPECIMN AEROBIC: CPT

## 2024-05-08 PROCEDURE — 94761 N-INVAS EAR/PLS OXIMETRY MLT: CPT

## 2024-05-08 PROCEDURE — 31624 DX BRONCHOSCOPE/LAVAGE: CPT

## 2024-05-08 RX ORDER — MAGNESIUM SULFATE IN WATER 40 MG/ML
2000 INJECTION, SOLUTION INTRAVENOUS ONCE
Status: COMPLETED | OUTPATIENT
Start: 2024-05-08 | End: 2024-05-08

## 2024-05-08 RX ADMIN — DEXMEDETOMIDINE HYDROCHLORIDE 0.4 MCG/KG/HR: 4 INJECTION, SOLUTION INTRAVENOUS at 19:41

## 2024-05-08 RX ADMIN — BUDESONIDE 1 MG: 1 SUSPENSION RESPIRATORY (INHALATION) at 07:20

## 2024-05-08 RX ADMIN — POLYETHYLENE GLYCOL 3350 34 G: 17 POWDER, FOR SOLUTION ORAL at 21:12

## 2024-05-08 RX ADMIN — MAGNESIUM SULFATE HEPTAHYDRATE 2000 MG: 40 INJECTION, SOLUTION INTRAVENOUS at 09:17

## 2024-05-08 RX ADMIN — FENTANYL CITRATE 50 MCG: 50 INJECTION INTRAMUSCULAR; INTRAVENOUS at 11:13

## 2024-05-08 RX ADMIN — BUDESONIDE 1 MG: 1 SUSPENSION RESPIRATORY (INHALATION) at 19:17

## 2024-05-08 RX ADMIN — THIAMINE HYDROCHLORIDE 100 MG: 100 INJECTION, SOLUTION INTRAMUSCULAR; INTRAVENOUS at 09:16

## 2024-05-08 RX ADMIN — CHLORHEXIDINE GLUCONATE 15 ML: 1.2 RINSE ORAL at 21:11

## 2024-05-08 RX ADMIN — FAMOTIDINE 20 MG: 10 INJECTION INTRAVENOUS at 09:17

## 2024-05-08 RX ADMIN — HEPARIN SODIUM 5000 UNITS: 5000 INJECTION INTRAVENOUS; SUBCUTANEOUS at 22:06

## 2024-05-08 RX ADMIN — POLYETHYLENE GLYCOL 3350 34 G: 17 POWDER, FOR SOLUTION ORAL at 09:14

## 2024-05-08 RX ADMIN — DOCUSATE SODIUM LIQUID 100 MG: 100 LIQUID ORAL at 21:12

## 2024-05-08 RX ADMIN — PIPERACILLIN AND TAZOBACTAM 3375 MG: 3; .375 INJECTION, POWDER, FOR SOLUTION INTRAVENOUS at 21:09

## 2024-05-08 RX ADMIN — HEPARIN SODIUM 5000 UNITS: 5000 INJECTION INTRAVENOUS; SUBCUTANEOUS at 06:15

## 2024-05-08 RX ADMIN — HEPARIN SODIUM 5000 UNITS: 5000 INJECTION INTRAVENOUS; SUBCUTANEOUS at 15:19

## 2024-05-08 RX ADMIN — PIPERACILLIN AND TAZOBACTAM 3375 MG: 3; .375 INJECTION, POWDER, FOR SOLUTION INTRAVENOUS at 06:15

## 2024-05-08 RX ADMIN — CHLORHEXIDINE GLUCONATE 15 ML: 1.2 RINSE ORAL at 09:16

## 2024-05-08 RX ADMIN — DEXMEDETOMIDINE HYDROCHLORIDE 0.6 MCG/KG/HR: 4 INJECTION, SOLUTION INTRAVENOUS at 06:38

## 2024-05-08 RX ADMIN — FENTANYL CITRATE 50 MCG: 50 INJECTION INTRAMUSCULAR; INTRAVENOUS at 11:02

## 2024-05-08 RX ADMIN — PIPERACILLIN AND TAZOBACTAM 3375 MG: 3; .375 INJECTION, POWDER, FOR SOLUTION INTRAVENOUS at 11:21

## 2024-05-08 RX ADMIN — DOCUSATE SODIUM LIQUID 100 MG: 100 LIQUID ORAL at 09:16

## 2024-05-08 RX ADMIN — VANCOMYCIN HYDROCHLORIDE 1500 MG: 10 INJECTION, POWDER, LYOPHILIZED, FOR SOLUTION INTRAVENOUS at 09:48

## 2024-05-08 ASSESSMENT — PAIN DESCRIPTION - ORIENTATION
ORIENTATION: OTHER (COMMENT)
ORIENTATION: OTHER (COMMENT)

## 2024-05-08 ASSESSMENT — PAIN DESCRIPTION - DESCRIPTORS
DESCRIPTORS: OTHER (COMMENT)
DESCRIPTORS: OTHER (COMMENT)

## 2024-05-08 ASSESSMENT — PAIN SCALES - GENERAL
PAINLEVEL_OUTOF10: 0
PAINLEVEL_OUTOF10: 4

## 2024-05-08 ASSESSMENT — PULMONARY FUNCTION TESTS
PIF_VALUE: 24
PIF_VALUE: 21
PIF_VALUE: 19
PIF_VALUE: 23
PIF_VALUE: 23
PIF_VALUE: 17

## 2024-05-08 ASSESSMENT — PAIN DESCRIPTION - LOCATION
LOCATION: OTHER (COMMENT)
LOCATION: OTHER (COMMENT)

## 2024-05-08 NOTE — INTERDISCIPLINARY ROUNDS
[facility]  Pulmonary, Critical Care, and Sleep Medicine  Interdisciplinary and Ventilator Weaning Rounds    Patient discussed in morning walking rounds and interdisciplinary rounds.    ICU admission day: 5/7    Overnight events:   No acute evnets    Assessments and best practice:  Ventilator  Ventilator Day(s): 3  Vent Settings  FiO2 : 70 %  Resp Rate (Set): 22 bpm  PEEP/CPAP (cmH2O): 8  Insp Time (sec): 0.8 sec  Insp Rise Time (%): 50 %  Flow Sensitivity: 4 L/min  Disconnect Sensitivity (%): 75 %  Expiratory Sensitivity (%): 25 %  Set Pressure: (S) 22 (found rate adjusted changed back to 16)   VAP bundle, aim to keep peak plateau pressure 25-30cm H2O  Weaning assessed and documented   Patient does notmeet criteria for SBT.   Patient is not on sedation holiday.  Plan to wean with PS n/a.  Outcome: full vent support   Final plan: full vent support  Glycemic control  Diet  Diet NPO  Stress ulcer prophylaxis.  Pepcid  DVT prophylaxis.  SQH  Need for Lines, trejo assessed.  Yes  Restraint Reevaluation   Yes  I have reevaluated the patient one hour after initiation of intervention. The patient is comfortable, uninjured, but continues to pose an imminent risk of injury to self to themselves and/or serious disruption of medical treatment required to keep patient stable. The patient's current medical and behavioral conditions that warrant the use intervention include Poor safety judgment:  Mild forgetfulness or impaired judgment and restricted weight-bearing status following orthopedic surgery and Poor safety judgment:  Impaired recall and forgetfulness to use of call light.  Restraint or seclusion will be discontinued at the earliest possible time, regardless of the scheduled expiration of the order. Based on my evaluation, restraints will be continued: Yes  Disposition regarding transferring out of the ICU  RED      Family contact/MPOA:   Family updated by ICU team    Palliative consult within 3  days of admission to ICU-  Ethics Guidance: 21 days      Daily Plans:  Bronch     ISAIAH time 10 minutes        Fara Pinzon RN  05/08/24  Pulmonary, Critical Care Medicine  [facility]

## 2024-05-08 NOTE — INTERDISCIPLINARY ROUNDS
[facility]  Pulmonary, Critical Care, and Sleep Medicine  Interdisciplinary and Ventilator Weaning Rounds    Patient discussed in morning walking rounds and interdisciplinary rounds.    ICU admission day: 5/7    Overnight events:   No acute evnets    Assessments and best practice:  Ventilator  Ventilator Day(s): 3  Vent Settings  FiO2 : 70 %  Resp Rate (Set): 22 bpm  PEEP/CPAP (cmH2O): 8  Insp Time (sec): 0.8 sec  Insp Rise Time (%): 50 %  Flow Sensitivity: 4 L/min  Disconnect Sensitivity (%): 75 %  Expiratory Sensitivity (%): 25 %  Set Pressure: (S) 22 (found rate adjusted changed back to 16)   VAP bundle, aim to keep peak plateau pressure 25-30cm H2O  Weaning assessed and documented   Patient does notmeet criteria for SBT.   Patient is not on sedation holiday.  Plan to wean with PS n/a.  Outcome: full vent support   Final plan: full vent support  Glycemic control  Diet  Diet NPO  Stress ulcer prophylaxis.  Pepcid  DVT prophylaxis.  SQH  Need for Lines, trejo assessed.  Yes  Restraint Reevaluation   Yes  I have reevaluated the patient one hour after initiation of intervention. The patient is comfortable, uninjured, but continues to pose an imminent risk of injury to self to themselves and/or serious disruption of medical treatment required to keep patient stable. The patient's current medical and behavioral conditions that warrant the use intervention include Poor safety judgment:  Mild forgetfulness or impaired judgment and restricted weight-bearing status following orthopedic surgery and Poor safety judgment:  Impaired recall and forgetfulness to use of call light.  Restraint or seclusion will be discontinued at the earliest possible time, regardless of the scheduled expiration of the order. Based on my evaluation, restraints will be continued: Yes  Disposition regarding transferring out of the ICU  RED      Family contact/MPOA:   Family updated by ICU team    Palliative consult within 3

## 2024-05-08 NOTE — PROGRESS NOTES
Pt remains sedated and intubated no signs of distres snoted will continue to monitor and assess respiratory vitals.   05/08/24 1640   Patient Observation   Pulse 73   Respirations 22   SpO2 97 %   Breath Sounds   Breath Sounds Bilateral Diminished   Right Upper Lobe Diminished   Left Upper Lobe Diminished   Ventilator Settings   FiO2  (S)  65 %   Insp Time (sec) 0.8 sec   Resp Rate (Set) 22 bpm   PEEP/CPAP (cmH2O) 8   Target Vt 500   Vent Patient Data (Readings)   Vt Spont (mL) 527 mL   Vt Mandatory Exp (mL) 564 mL   Vt (Measured) 527 mL   Peak Inspiratory Pressure (cmH2O) 21 cmH2O   Rate Measured 25 br/min   Minute Volume (L/min) 12.9 Liters   Mean Airway Pressure (cmH2O) 12 cmH20   Plateau Pressure (cm H2O) 20 cm H2O   Driving Pressure 12   I:E Ratio 1:2.0   Flow Sensitivity 4 L/min   Static Compliance (L/cm H2O) 20   I Time/ I Time % 0.8 s   Insp Rise Time (%) 50 %   Backup Apnea On   Backup Rate 22 Breaths Per Minute   Backup Vt 500   Backup I Time 1   Airway Clearance   Suction ET Tube   Subglottic Suction Done Yes   Suction Device Inline suction catheter   Sputum Method Obtained Endotracheal   Sputum Amount Small   Sputum Color/Odor White;Tan   ETT    Placement Date/Time: 05/06/24 1841   Present on Admission/Arrival: No  Placed By: In ED;Licensed provider  Placement Verified By: Auscultation;Capnometry;Colorimetric ETCO2 device;Direct visualization  Preoxygenation: Yes  Mask Ventilation: Ventilated...   Secured At 24 cm   Measured From Lips   ETT Placement Center   Secured By Commercial tube haas   Site Assessment Cool;Dry   Cuff Pressure 26 cm H2O   Tie/Haas Changed Yes   Ventilator Associated Pneumonia Bundle   Elevation of Head of Bed to 30-45 Degrees  Yes   ETT/Trach Suctioning Yes   Skin Assessment   Skin Assessment Clean, dry, & intact   Skin Protection for O2 Device Yes   Location Cheek;Lip;Tongue

## 2024-05-08 NOTE — PROGRESS NOTES
RT at bedside for assessment. Patient remains intubated and sedated. Pt may not be clinically ready for SBT due to High Fio2 and Peep. Will consult during rounds for next course of action. Will continue to monitor and assess respiratory vitals. No signs of distress noted.   05/08/24 0723   Ventilator Settings   FiO2  70 %   Insp Time (sec) 0.8 sec   Resp Rate (Set) 22 bpm   PEEP/CPAP (cmH2O) 8   Target Vt 500   Vent Patient Data (Readings)   Vt Mandatory Exp (mL) 540 mL   Vt (Measured) 508 mL   Peak Inspiratory Pressure (cmH2O) 19 cmH2O   Rate Measured 27 br/min   Minute Volume (L/min) 15.6 Liters   Mean Airway Pressure (cmH2O) 13 cmH20   Plateau Pressure (cm H2O) 14 cm H2O   Driving Pressure 6   Inspiratory Time 0.8 sec   I:E Ratio 1:1.5   Flow Sensitivity 4 L/min   PEEP Intrinsic (cm H2O) 0.4 cm H2O   Static Compliance (L/cm H2O) 35   I Time/ I Time % 0.8 s   Insp Rise Time (%) 50 %   Backup Apnea On   Backup Rate 22 Breaths Per Minute   Backup Vt 460   Backup I Time 1   Vent Alarm Settings   Low Pressure (cmH2O) 13.5 cmH2O   High Pressure (cmH2O) 40 cmH2O   Low Minute Volume (lpm) 3 L/min   High Minute Volume (lpm) 18 L/min   Low Exhaled Vt (ml) 200 mL   High Exhaled Vt (ml) 800 mL   RR High (bpm) 40 br/min   Apnea (secs) 20 secs   Additional Respiratoray Assessments   Humidification Source Heated wire   Humidification Temp 37.4   Circuit Condensation Drained   Ambu Bag With Mask At Bedside Yes   Airway Clearance   Suction ET Tube   Subglottic Suction Done Yes   Suction Device Inline suction catheter   Sputum Method Obtained Endotracheal   Sputum Amount Scant   Sputum Color/Odor White   Sputum Consistency Thin   ETT    Placement Date/Time: 05/06/24 1841   Present on Admission/Arrival: No  Placed By: In ED;Licensed provider  Placement Verified By: Auscultation;Capnometry;Colorimetric ETCO2 device;Direct visualization  Preoxygenation: Yes  Mask Ventilation: Ventilated...   Secured At 24 cm   Measured From Lips

## 2024-05-08 NOTE — PROCEDURES
[facility]  Pulmonary, Critical Care, and Sleep Medicine    Name: Paul Bobby MRN: [id number]   : 1940 Hospital: [facility]   Date: 2024        Bronchoscopy Report    Procedure:  Diagnostic and therapeutic  bronchoscopy.    Indication:  Aspiration and mucous plugging    Timeout verified the correct patient and correct procedure.     Anesthesia:   Patient on ventilator and receiving  Fentanyl      Procedure Details:   -- The bronchoscope was introduced through an endotracheal tube.   -- The trachea and toni were completely inspected and were found purulent secretions emanating from left mainstem bronchus.  -- The left-sided endobronchial anatomy was completely inspected and was found purulent secretions in the left mainstem bronchus and going into left lower lobe.  There were extensive along with significant mucous plugging.  Therapeutic lavage was performed using multiple aliquots of saline instilled through the bronchoscope and these were aspirated as completely as possible.  -- The right-sided endobronchial anatomy was completely inspected and was found purulent secretions in the right mainstem bronchus and going into right lower lobe.  There were extensive along with significant mucous plugging.  Therapeutic lavage was performed using multiple aliquots of saline instilled through the bronchoscope and these were aspirated as completely as possible.  During therapeutic lavage with aspiration, food particles were aspirated via bronchoscope.  I found a corn kernel in the distal segment of the right lower lobe lateral segment.  This took many attempts at removal but was eventually removed with suction and traction of the bronchoscope with removal of the bronchoscope with a corn kernel.  The lungs were then reinspected and found no other food particles.      Specimens:   The bronchoscope was wedged in the RLL and bronchoalveolar lavage was performed; material was

## 2024-05-08 NOTE — PROGRESS NOTES
[facility]   Pharmacy Pharmacokinetic Monitoring Service - Vancomycin    Indication: sepsis  Goal level: 10-20 mg/L  Day of Therapy: 3  Additional Antimicrobials: pip-tazo    Pertinent Laboratory Values:   Wt Readings from Last 1 Encounters:   05/06/24 74.8 kg (165 lb)     Temp Readings from Last 1 Encounters:   05/08/24 98.5 °F (36.9 °C)     Estimated Creatinine Clearance: 35 mL/min (A) (based on SCr of 1.69 mg/dL (H)).    Recent Labs     05/07/24  0500 05/08/24  0500   CREATININE 2.23* 1.69*   BUN 25* 24*   WBC 6.6 11.8     Pertinent Cultures:  Date Source Results   5/6 blood CoNS in 1/2 5/7 ET aspirate GPC, yeast   5/7 ET aspirate GPC, yeast   MRSA Nasal Swab: pending    Assessment:  Date Current Dose Level (mg/L) Timing of Level (h)   5/6 1,750 mg x1 - -   5/7 - - -   5/8 1,500 mg x1 5.4 32     Plan:  Dose by level  Dose: 1,500 mg x1  Ordered a level for 5/9 with AM labs  Pharmacy will continue to monitor patient and adjust therapy as indicated    Thank you for the consult,  Toy Velasquez [facility]  5/8/2024

## 2024-05-08 NOTE — PROGRESS NOTES
[facility].  Pulmonary, Critical Care, and Sleep Medicine    Name: Paul Bobby MRN: [id number]   : 1940 Hospital: [facility]   Date: 2024  Admission Date: 2024     Chart and notes reviewed. Data reviewed. I have evaluated all findings.    [x]I have reviewed the flowsheet and previous day’s notes.    [x]The patient is unable to give any meaningful history or review of systems because the patient is:  [x]Intubated [x]Sedated   []Unresponsive      [x]The patient is critically ill on      [x]Mechanical ventilation [x]Pressors   []BiPAP []         Interval HPI:]  Patient is a 83 y.o. male with a PMHx of dementia, DM II, HLD, and  COPD who presented to [facility] ER with encephalopathy and hypoxia. Per EMS, patient [facility] and was evaluated by the nurse practitioner who found him altered and vomiting. She was concerned he aspirated on his lunch and called EMS. On arrival to ER, he was on NRB with oxygen saturation In the 70's and tachypneic. Patient was initially placed on Bipap by RT with little improvement of oxygenation. ABG was done and demonstrated metabolic acidosis, no hypercapnia. D/w ED physician, patient was placed on Bipap without medical update that patient had been vomiting prior to arrival. Update was later provided by the daughter and patient was removed from Bipap.  Decision was made to emergently intubate patient, however, prior to intubation patient suffered from large vomiting episode with inability to clear secretions. Apparently patient was not on Bipap at this time, unclear when patient was taken off. Patient continued with respiratory distress and lethargy, so patient was emergently intubated. Patient became hypotensive, refractory to IVF resuscitation and CVL was placed. Levophed was started. S/p bronch  which demonstrated mucous plugging, purulent secretions, and corn kernel particles.     Subjective 24  Hospital Day: 24   Vent Day:  functional status and ongoing aspiration, with severe toxic/metabolic encephalopathy, patient has high risk for recurrence and I recommend hospice/comfort measures--palliative care consulted.  Patient also has ESTRELLA, secondary to prerenal azotemia versus ischemic ATN.  Patient also found to have parainfluenza positive, however pneumonia secondary to aspiration pneumonia.     Acute prognosis is poor, long-term prognosis is very poor given underlying dementia with severe aspiration pneumonia         Remi Mendez MD/MPH     Pulmonary, Critical Care Medicine  [facility]

## 2024-05-08 NOTE — PROGRESS NOTES
Pt remains intubated and sedated. No signs of distress noticed will continue to monitor respiratory vitals.   05/08/24 1208   Patient Observation   Pulse 77   Respirations 23   SpO2 99 %   Breath Sounds   Breath Sounds Bilateral Diminished   Right Upper Lobe Diminished   Left Upper Lobe Diminished   Vent Information   Ventilator Day(s) 3   Vent Mode AC/PRVC   Ventilator Settings   FiO2  70 %   Insp Time (sec) 0.8 sec   Resp Rate (Set) 22 bpm   PEEP/CPAP (cmH2O) 8   Target Vt 500   Vent Patient Data (Readings)   Vt Mandatory Exp (mL) 590 mL   Vt (Measured) 514 mL   Peak Inspiratory Pressure (cmH2O) 17 cmH2O   Rate Measured 29 br/min   Minute Volume (L/min) 12.5 Liters   Mean Airway Pressure (cmH2O) 11 cmH20   Plateau Pressure (cm H2O) 18 cm H2O   Driving Pressure 10   Inspiratory Time 0.8 sec   I:E Ratio 1:1.6   Flow Sensitivity 4 L/min   I Time/ I Time % 0.8 s   Backup Apnea On   Backup Rate 22 Breaths Per Minute   Backup Vt 460   Backup I Time 1   ETT    Placement Date/Time: 05/06/24 1841   Present on Admission/Arrival: No  Placed By: In ED;Licensed provider  Placement Verified By: Auscultation;Capnometry;Colorimetric ETCO2 device;Direct visualization  Preoxygenation: Yes  Mask Ventilation: Ventilated...   Secured At 24 cm   Measured From Lips   ETT Placement Right   Secured By Commercial tube haas   Site Assessment Cool;Dry   Cuff Pressure 27 cm H2O   Tie/Haas Changed No   Skin Assessment   Skin Assessment Clean, dry, & intact   Skin Protection for O2 Device Yes   Location Cheek;Lip;Tongue   Interventions Reposition device

## 2024-05-08 NOTE — PLAN OF CARE
Problem: Safety - Medical Restraint  Goal: Remains free of injury from restraints (Restraint for Interference with Medical Device)  Description: INTERVENTIONS:  1. Determine that other, less restrictive measures have been tried or would not be effective before applying the restraint  2. Evaluate the patient's condition at the time of restraint application  3. Inform patient/family regarding the reason for restraint  4. Q2H: Monitor safety, psychosocial status, comfort, nutrition and hydration  Outcome: Progressing  Flowsheets  Taken 5/8/2024 1400  Remains free of injury from restraints (restraint for interference with medical device):   Determine that other, less restrictive measures have been tried or would not be effective before applying the restraint   Evaluate the patient's condition at the time of restraint application   Inform patient/family regarding the reason for restraint   Every 2 hours: Monitor safety, psychosocial status, comfort, nutrition and hydration  Taken 5/8/2024 1200  Remains free of injury from restraints (restraint for interference with medical device):   Determine that other, less restrictive measures have been tried or would not be effective before applying the restraint   Evaluate the patient's condition at the time of restraint application   Inform patient/family regarding the reason for restraint   Every 2 hours: Monitor safety, psychosocial status, comfort, nutrition and hydration  Taken 5/8/2024 1000  Remains free of injury from restraints (restraint for interference with medical device):   Determine that other, less restrictive measures have been tried or would not be effective before applying the restraint   Evaluate the patient's condition at the time of restraint application   Inform patient/family regarding the reason for restraint   Every 2 hours: Monitor safety, psychosocial status, comfort, nutrition and hydration  Taken 5/8/2024 0800  Remains free of injury from restraints

## 2024-05-08 NOTE — PROGRESS NOTES
attended the interdisciplinary rounds for Paul Bobby, who is a 83 y.o.,male. Patient's Primary Language is: English.   According to the patient's EMR Denominational Affiliation is: Seventh day Buddhist.     The reason the Patient came to the hospital is:   Patient Active Problem List    Diagnosis Date Noted    ESTRELLA (acute kidney injury) ([facility]) 05/08/2024    Septic shock (HCC) 05/08/2024    Lactic acidosis 05/08/2024    Multifocal pneumonia 05/08/2024    Metabolic acidosis 05/08/2024    Aspiration pneumonia of both lungs due to vomit (HCC) 05/07/2024    Aspiration of food 05/07/2024    Mucus plugging of bronchi 05/07/2024    Acute encephalopathy 05/07/2024    Acute hypoxemic respiratory failure (HCC) 05/07/2024    AMS (altered mental status) 05/06/2024    Acute respiratory failure (HCC) 05/06/2024    Dementia ([facility]) 11/04/2022    Syncope 11/01/2022    Weakness 10/31/2022          Plan:   participated and listen to the recommendations of the IDR team.    Patient intubated. Starting to become more alert. No major incidents overnight.     Chaplains will continue to follow and will provide pastoral care on an as needed/requested basis.     recommends bedside caregivers page  on duty if patient shows signs of acute spiritual or emotional distress.     Kb Zepeda  Staff   Spiritual Health   [phone]

## 2024-05-08 NOTE — PROCEDURES
[facility]  Pulmonary, Critical Care, and Sleep Medicine    Name: Paul Bobby MRN: [id number]   : 1940 Hospital: [facility]   Date: 2024        Bronchoscopy Report    Procedure:  Diagnostic and therapeutic  bronchoscopy.    Indication:  Aspiration and mucous plugging    Timeout verified the correct patient and correct procedure.     Anesthesia:   Patient on ventilator and receiving  Fentanyl      Procedure Details:   -- The bronchoscope was introduced through an endotracheal tube.   -- The trachea and toni were completely inspected and were found to be normal  -- The right-sided endobronchial anatomy was completely inspected and showed some fair amount of sticky mucoid secretions in the RLL which were lavaged and aspirated as completely as possible.  No remaining food particles were found.  -- The left-sided endobronchial anatomy was completely inspected and was found to be normal.      Specimens:   The bronchoscope was wedged in the RLL and bronchoalveolar lavage was performed; material was sent for  microbiology    Complications: none    Estimated Blood Loss: Minimal        Remi Mendez MD/MPH     Pulmonary, Critical Care Medicine  [facility]

## 2024-05-09 ENCOUNTER — APPOINTMENT (OUTPATIENT)
Facility: HOSPITAL | Age: 84
End: 2024-05-09
Payer: MEDICARE

## 2024-05-09 LAB
ANION GAP SERPL CALC-SCNC: 5 MMOL/L (ref 3–18)
ARTERIAL PATENCY WRIST A: POSITIVE
BASE DEFICIT BLD-SCNC: 3.8 MMOL/L
BASOPHILS # BLD: 0 K/UL (ref 0–0.1)
BASOPHILS NFR BLD: 0 % (ref 0–2)
BDY SITE: ABNORMAL
BUN SERPL-MCNC: 26 MG/DL (ref 7–18)
BUN/CREAT SERPL: 19 (ref 12–20)
CALCIUM SERPL-MCNC: 8.7 MG/DL (ref 8.5–10.1)
CHLORIDE SERPL-SCNC: 112 MMOL/L (ref 100–111)
CO2 SERPL-SCNC: 24 MMOL/L (ref 21–32)
CREAT SERPL-MCNC: 1.36 MG/DL (ref 0.6–1.3)
DIFFERENTIAL METHOD BLD: ABNORMAL
EOSINOPHIL # BLD: 0.1 K/UL (ref 0–0.4)
EOSINOPHIL NFR BLD: 1 % (ref 0–5)
ERYTHROCYTE [DISTWIDTH] IN BLOOD BY AUTOMATED COUNT: 14.1 % (ref 11.6–14.5)
GAS FLOW.O2 O2 DELIVERY SYS: ABNORMAL
GAS FLOW.O2 SETTING OXYMISER: 22 BPM
GLUCOSE BLD STRIP.AUTO-MCNC: 121 MG/DL (ref 70–110)
GLUCOSE BLD STRIP.AUTO-MCNC: 133 MG/DL (ref 70–110)
GLUCOSE SERPL-MCNC: 138 MG/DL (ref 74–99)
HCO3 BLD-SCNC: 19.8 MMOL/L (ref 22–26)
HCT VFR BLD AUTO: 35.7 % (ref 36–48)
HGB BLD-MCNC: 11.7 G/DL (ref 13–16)
IMM GRANULOCYTES # BLD AUTO: 0 K/UL (ref 0–0.04)
IMM GRANULOCYTES NFR BLD AUTO: 0 % (ref 0–0.5)
IPAP/PIP/HIGH PEEP: 19
LACTATE SERPL-SCNC: 2.4 MMOL/L (ref 0.4–2)
LACTATE SERPL-SCNC: 2.7 MMOL/L (ref 0.4–2)
LACTATE SERPL-SCNC: 2.8 MMOL/L (ref 0.4–2)
LYMPHOCYTES # BLD: 1 K/UL (ref 0.9–3.6)
LYMPHOCYTES NFR BLD: 9 % (ref 21–52)
MAGNESIUM SERPL-MCNC: 2.4 MG/DL (ref 1.6–2.6)
MCH RBC QN AUTO: 30.8 PG (ref 24–34)
MCHC RBC AUTO-ENTMCNC: 32.8 G/DL (ref 31–37)
MCV RBC AUTO: 93.9 FL (ref 78–100)
METAMYELOCYTES NFR BLD MANUAL: 1 %
MONOCYTES # BLD: 0.1 K/UL (ref 0.05–1.2)
MONOCYTES NFR BLD: 1 % (ref 3–10)
NEUTS BAND NFR BLD MANUAL: 15 %
NEUTS SEG # BLD: 9.9 K/UL (ref 1.8–8)
NEUTS SEG NFR BLD: 73 % (ref 40–73)
NRBC # BLD: 0 K/UL (ref 0–0.01)
NRBC BLD-RTO: 0 PER 100 WBC
O2/TOTAL GAS SETTING VFR VENT: 65 %
PAW @ MEAN EXP FLOW ON VENT: 12 CMH2O
PCO2 BLD: 30.5 MMHG (ref 35–45)
PEEP RESPIRATORY: 8 CMH2O
PH BLD: 7.42 (ref 7.35–7.45)
PHOSPHATE SERPL-MCNC: 2.4 MG/DL (ref 2.5–4.9)
PLATELET # BLD AUTO: 170 K/UL (ref 135–420)
PLATELET COMMENT: ABNORMAL
PMV BLD AUTO: 10.3 FL (ref 9.2–11.8)
PO2 BLD: 93 MMHG (ref 80–100)
POTASSIUM SERPL-SCNC: 4.7 MMOL/L (ref 3.5–5.5)
RBC # BLD AUTO: 3.8 M/UL (ref 4.35–5.65)
RBC MORPH BLD: ABNORMAL
RESPIRATORY RATE, POC: 24 (ref 5–40)
SAO2 % BLD: 97.5 % (ref 92–97)
SERVICE CMNT-IMP: ABNORMAL
SODIUM SERPL-SCNC: 141 MMOL/L (ref 136–145)
SPECIMEN TYPE: ABNORMAL
VANCOMYCIN SERPL-MCNC: 10.9 UG/ML (ref 5–40)
VENTILATION MODE VENT: ABNORMAL
VT SETTING VENT: 500 ML
WBC # BLD AUTO: 11.2 K/UL (ref 4.6–13.2)
WBC MORPH BLD: ABNORMAL

## 2024-05-09 PROCEDURE — 94761 N-INVAS EAR/PLS OXIMETRY MLT: CPT

## 2024-05-09 PROCEDURE — 99291 CRITICAL CARE FIRST HOUR: CPT | Performed by: INTERNAL MEDICINE

## 2024-05-09 PROCEDURE — 36600 WITHDRAWAL OF ARTERIAL BLOOD: CPT

## 2024-05-09 PROCEDURE — 2580000003 HC RX 258: Performed by: EMERGENCY MEDICINE

## 2024-05-09 PROCEDURE — 80202 ASSAY OF VANCOMYCIN: CPT

## 2024-05-09 PROCEDURE — 6360000002 HC RX W HCPCS: Performed by: INTERNAL MEDICINE

## 2024-05-09 PROCEDURE — 82962 GLUCOSE BLOOD TEST: CPT

## 2024-05-09 PROCEDURE — 2500000003 HC RX 250 WO HCPCS: Performed by: EMERGENCY MEDICINE

## 2024-05-09 PROCEDURE — A4216 STERILE WATER/SALINE, 10 ML: HCPCS | Performed by: EMERGENCY MEDICINE

## 2024-05-09 PROCEDURE — 2580000003 HC RX 258: Performed by: REGISTERED NURSE

## 2024-05-09 PROCEDURE — 80048 BASIC METABOLIC PNL TOTAL CA: CPT

## 2024-05-09 PROCEDURE — 6360000002 HC RX W HCPCS: Performed by: NURSE PRACTITIONER

## 2024-05-09 PROCEDURE — 36592 COLLECT BLOOD FROM PICC: CPT

## 2024-05-09 PROCEDURE — 6360000002 HC RX W HCPCS: Performed by: EMERGENCY MEDICINE

## 2024-05-09 PROCEDURE — 82803 BLOOD GASES ANY COMBINATION: CPT

## 2024-05-09 PROCEDURE — 83735 ASSAY OF MAGNESIUM: CPT

## 2024-05-09 PROCEDURE — 94003 VENT MGMT INPAT SUBQ DAY: CPT

## 2024-05-09 PROCEDURE — 84100 ASSAY OF PHOSPHORUS: CPT

## 2024-05-09 PROCEDURE — 6360000002 HC RX W HCPCS: Performed by: REGISTERED NURSE

## 2024-05-09 PROCEDURE — 6370000000 HC RX 637 (ALT 250 FOR IP): Performed by: NURSE PRACTITIONER

## 2024-05-09 PROCEDURE — 2000000000 HC ICU R&B

## 2024-05-09 PROCEDURE — 83605 ASSAY OF LACTIC ACID: CPT

## 2024-05-09 PROCEDURE — 94640 AIRWAY INHALATION TREATMENT: CPT

## 2024-05-09 PROCEDURE — 2500000003 HC RX 250 WO HCPCS: Performed by: NURSE PRACTITIONER

## 2024-05-09 PROCEDURE — 2700000000 HC OXYGEN THERAPY PER DAY

## 2024-05-09 PROCEDURE — 85025 COMPLETE CBC W/AUTO DIFF WBC: CPT

## 2024-05-09 PROCEDURE — 71045 X-RAY EXAM CHEST 1 VIEW: CPT

## 2024-05-09 PROCEDURE — 6370000000 HC RX 637 (ALT 250 FOR IP): Performed by: EMERGENCY MEDICINE

## 2024-05-09 PROCEDURE — 94669 MECHANICAL CHEST WALL OSCILL: CPT

## 2024-05-09 RX ORDER — VANCOMYCIN 1.75 G/350ML
1250 INJECTION, SOLUTION INTRAVENOUS ONCE
Status: COMPLETED | OUTPATIENT
Start: 2024-05-09 | End: 2024-05-09

## 2024-05-09 RX ADMIN — HEPARIN SODIUM 5000 UNITS: 5000 INJECTION INTRAVENOUS; SUBCUTANEOUS at 05:42

## 2024-05-09 RX ADMIN — FAMOTIDINE 20 MG: 10 INJECTION INTRAVENOUS at 09:03

## 2024-05-09 RX ADMIN — BUDESONIDE 1 MG: 1 SUSPENSION RESPIRATORY (INHALATION) at 19:32

## 2024-05-09 RX ADMIN — HEPARIN SODIUM 5000 UNITS: 5000 INJECTION INTRAVENOUS; SUBCUTANEOUS at 14:00

## 2024-05-09 RX ADMIN — VANCOMYCIN 1250 MG: 1.75 INJECTION, SOLUTION INTRAVENOUS at 11:30

## 2024-05-09 RX ADMIN — HEPARIN SODIUM 5000 UNITS: 5000 INJECTION INTRAVENOUS; SUBCUTANEOUS at 21:32

## 2024-05-09 RX ADMIN — POLYETHYLENE GLYCOL 3350 34 G: 17 POWDER, FOR SOLUTION ORAL at 09:02

## 2024-05-09 RX ADMIN — FENTANYL CITRATE 50 MCG: 50 INJECTION INTRAMUSCULAR; INTRAVENOUS at 21:42

## 2024-05-09 RX ADMIN — PIPERACILLIN AND TAZOBACTAM 3375 MG: 3; .375 INJECTION, POWDER, FOR SOLUTION INTRAVENOUS at 04:30

## 2024-05-09 RX ADMIN — CHLORHEXIDINE GLUCONATE 15 ML: 1.2 RINSE ORAL at 09:02

## 2024-05-09 RX ADMIN — CHLORHEXIDINE GLUCONATE 15 ML: 1.2 RINSE ORAL at 21:08

## 2024-05-09 RX ADMIN — BUDESONIDE 1 MG: 1 SUSPENSION RESPIRATORY (INHALATION) at 07:14

## 2024-05-09 RX ADMIN — FENTANYL CITRATE 50 MCG: 50 INJECTION INTRAMUSCULAR; INTRAVENOUS at 19:55

## 2024-05-09 RX ADMIN — PIPERACILLIN AND TAZOBACTAM 3375 MG: 3; .375 INJECTION, POWDER, FOR SOLUTION INTRAVENOUS at 20:45

## 2024-05-09 RX ADMIN — DEXMEDETOMIDINE HYDROCHLORIDE 0.2 MCG/KG/HR: 4 INJECTION, SOLUTION INTRAVENOUS at 21:37

## 2024-05-09 RX ADMIN — THIAMINE HYDROCHLORIDE 100 MG: 100 INJECTION, SOLUTION INTRAMUSCULAR; INTRAVENOUS at 09:03

## 2024-05-09 RX ADMIN — PIPERACILLIN AND TAZOBACTAM 3375 MG: 3; .375 INJECTION, POWDER, FOR SOLUTION INTRAVENOUS at 12:30

## 2024-05-09 RX ADMIN — Medication 5 MCG/MIN: at 20:05

## 2024-05-09 RX ADMIN — POLYETHYLENE GLYCOL 3350 34 G: 17 POWDER, FOR SOLUTION ORAL at 21:08

## 2024-05-09 RX ADMIN — DOCUSATE SODIUM LIQUID 100 MG: 100 LIQUID ORAL at 21:08

## 2024-05-09 RX ADMIN — DOCUSATE SODIUM LIQUID 100 MG: 100 LIQUID ORAL at 09:03

## 2024-05-09 ASSESSMENT — PAIN SCALES - GENERAL
PAINLEVEL_OUTOF10: 0

## 2024-05-09 ASSESSMENT — PULMONARY FUNCTION TESTS
PIF_VALUE: 17
PIF_VALUE: 20
PIF_VALUE: 12
PIF_VALUE: 16
PIF_VALUE: 16

## 2024-05-09 NOTE — PROGRESS NOTES
[facility]   Pharmacy Pharmacokinetic Monitoring Service - Vancomycin    Indication: sepsis  Goal level: 10-20 mg/L  Day of Therapy: 4  Additional Antimicrobials: pip-tazo    Pertinent Laboratory Values:   Wt Readings from Last 1 Encounters:   05/08/24 74.8 kg (165 lb)     Temp Readings from Last 1 Encounters:   05/09/24 97.7 °F (36.5 °C) (Axillary)     Estimated Creatinine Clearance: 42 mL/min (A) (based on SCr of 1.36 mg/dL (H)).    Recent Labs     05/08/24  0500 05/09/24  0310   CREATININE 1.69* 1.36*   BUN 24* 26*   WBC 11.8 11.2     Pertinent Cultures:  Date Source Results   5/6 blood CoNS in 1/2 5/7 ET aspirate GPC, yeast   5/7 ET aspirate GPC, yeast   5/8 blood NGTD   5/8 BAL pending   MRSA Nasal Swab: present    Assessment:  Date Current Dose Level (mg/L) Timing of Level (h)   5/6 1,750 mg x1 - -   5/7 - - -   5/8 1,500 mg x1 5.4 32   5/9 1,250 mg x1 10.9 17     Plan:  Dose by level  Dose: 1,250 mg x1  Ordered a level for 5/10 with AM labs  Pharmacy will continue to monitor patient and adjust therapy as indicated    Thank you for the consult,  Toy Velasquez [facility]  5/9/2024

## 2024-05-09 NOTE — PROGRESS NOTES
Pt is tolerating SBT trial well. Pt is currently on 8/5 No signs of distress  noted. Will continue to monitor and assess respiratory vitals   05/09/24 0725   B: Both Spontaneous Awakening and Breathing Trials   Did Patient Receive Sedative and/or Opioid IV Medications in the Last 24 Hours Continuously infused meds for sedation   Safety Screening Spontaneous Awakening Trial (SAT) Proceed with SAT - no exclusion criteria met   Spontaneous Awakening Trial (SAT) Outcome SAT passed   Was Patient Receiving Mechanical Ventilation Yes   Safety Screening Spontaneous Breathing Trial (SBT) Proceed with SBT - no exclusion criteria met   RT Notified Ready for SBT Yes   Spontaneous  mL   Spontaneous Breathing Trial (SBT) Outcome SBT Passed   Weaning Parameters   Spontaneous Breathing Trial Complete Yes   Respiratory Rate Observed 21   Ve 10.8      RSBI 25.33

## 2024-05-09 NOTE — PROGRESS NOTES
Advance Care Planning   Healthcare Decision Maker:    Primary Decision Maker: Aimee Crump - Georgina - [phone]    Click here to complete Healthcare Decision Makers including selection of the Healthcare Decision Maker Relationship (ie \"Primary\").       attended the interdisciplinary rounds for Paul Bobby, who is a 83 y.o.,male. Patient's Primary Language is: English.   According to the patient's EMR Advent Affiliation is: Seventh day Holiness.     The reason the Patient came to the hospital is:   Patient Active Problem List    Diagnosis Date Noted    ESTRELLA (acute kidney injury) (HCC) 05/08/2024    Septic shock (HCC) 05/08/2024    Lactic acidosis 05/08/2024    Multifocal pneumonia 05/08/2024    Metabolic acidosis 05/08/2024    Aspiration pneumonia of both lungs due to vomit (HCC) 05/07/2024    Aspiration of food 05/07/2024    Mucus plugging of bronchi 05/07/2024    Acute encephalopathy 05/07/2024    Acute hypoxemic respiratory failure (HCC) 05/07/2024    AMS (altered mental status) 05/06/2024    Acute respiratory failure (HCC) 05/06/2024    Dementia (HCC) 11/04/2022    Syncope 11/01/2022    Weakness 10/31/2022          Plan:   participated and listen to the recommendations of the IDR team.    Patient intubate but will respond to voice.     Chaplains will continue to follow and will provide pastoral care on an as needed/requested basis.     recommends bedside caregivers page  on duty if patient shows signs of acute spiritual or emotional distress.     Kb Supai  Staff   Spiritual Health   [phone]

## 2024-05-09 NOTE — PLAN OF CARE
Problem: Safety - Medical Restraint  Goal: Remains free of injury from restraints (Restraint for Interference with Medical Device)  Description: INTERVENTIONS:  1. Determine that other, less restrictive measures have been tried or would not be effective before applying the restraint  2. Evaluate the patient's condition at the time of restraint application  3. Inform patient/family regarding the reason for restraint  4. Q2H: Monitor safety, psychosocial status, comfort, nutrition and hydration  5/9/2024 0143 by Isaiah Rodas RN  Outcome: Progressing  Flowsheets  Taken 5/9/2024 0000 by Rox Pereira RN  Remains free of injury from restraints (restraint for interference with medical device): Determine that other, less restrictive measures have been tried or would not be effective before applying the restraint  Taken 5/8/2024 2200 by Rox Pereira RN  Remains free of injury from restraints (restraint for interference with medical device): Determine that other, less restrictive measures have been tried or would not be effective before applying the restraint  Taken 5/8/2024 2000 by Isaiah Rodas RN  Remains free of injury from restraints (restraint for interference with medical device):   Determine that other, less restrictive measures have been tried or would not be effective before applying the restraint   Evaluate the patient's condition at the time of restraint application   Every 2 hours: Monitor safety, psychosocial status, comfort, nutrition and hydration   Inform patient/family regarding the reason for restraint  Taken 5/8/2024 1800 by Meghann Hannon RN  Remains free of injury from restraints (restraint for interference with medical device):   Determine that other, less restrictive measures have been tried or would not be effective before applying the restraint   Evaluate the patient's condition at the time of restraint application   Inform patient/family regarding the reason for restraint    status, comfort, nutrition and hydration  Taken 5/8/2024 0800  Remains free of injury from restraints (restraint for interference with medical device):   Determine that other, less restrictive measures have been tried or would not be effective before applying the restraint   Evaluate the patient's condition at the time of restraint application   Inform patient/family regarding the reason for restraint   Every 2 hours: Monitor safety, psychosocial status, comfort, nutrition and hydration

## 2024-05-09 NOTE — PROGRESS NOTES
Pt wa splace dback on AC/PRVC as he will not be extubatesd today. No sign sof distress noted will contine to monitor and assess   05/09/24 1651   Patient Observation   Pulse 64   Respirations 20   SpO2 100 %   Breath Sounds   Breath Sounds Bilateral Clear;Diminished   Right Upper Lobe Clear   Left Upper Lobe Clear   Vent Information   Vent Mode (S)  AC/PRVC   Ventilator Settings   Resp Rate (Set) 22 bpm   PEEP/CPAP (cmH2O) (S)  6   Target Vt 500   Vent Patient Data (Readings)   Vt Spont (mL) 543 mL   Vt Mandatory Exp (mL) 534 mL   Peak Inspiratory Pressure (cmH2O) 16 cmH2O   Rate Measured 22 br/min   Minute Volume (L/min) 11.9 Liters   Mean Airway Pressure (cmH2O) 9.6 cmH20   Plateau Pressure (cm H2O) 18 cm H2O   Driving Pressure 12   Inspiratory Time 0.8 sec   I:E Ratio 1:2.4   Insp Rise Time (%) 50 %   Airway Clearance   Suction ET Tube   Subglottic Suction Done No   ETT    Placement Date/Time: 05/06/24 1841   Present on Admission/Arrival: No  Placed By: In ED;Licensed provider  Placement Verified By: Auscultation;Capnometry;Colorimetric ETCO2 device;Direct visualization  Preoxygenation: Yes  Mask Ventilation: Ventilated...   Secured At 24 cm   Measured From Lips   ETT Placement Right   Secured By Commercial tube haas   Site Assessment Cool;Dry   Cuff Pressure 28 cm H2O   Tie/Haas Changed No

## 2024-05-09 NOTE — PROGRESS NOTES
Pt remains intubated and sedated. Will attempt sbt trials today. No signs of distress noted. Will continue to monitor and assess respiratory vitals   05/09/24 0710   Ventilator Settings   FiO2  50 %   Insp Time (sec) 0.8 sec   Resp Rate (Set) 22 bpm   PEEP/CPAP (cmH2O) 8   Target Vt 500   Vent Patient Data (Readings)   Vt Mandatory Exp (mL) 537 mL   Vt (Measured) 499 mL   Peak Inspiratory Pressure (cmH2O) 20 cmH2O   Rate Measured 22 br/min   Minute Volume (L/min) 11.8 Liters   Mean Airway Pressure (cmH2O) 12 cmH20   Plateau Pressure (cm H2O) 19 cm H2O   Driving Pressure 11   Inspiratory Time 0.8 sec   I:E Ratio 1:2.2   Flow Sensitivity 4 L/min   Static Compliance (L/cm H2O) 43   I Time/ I Time % 0.8 s   Backup Apnea On   Backup Rate 22 Breaths Per Minute   Backup Vt 500   Backup I Time 1   Vent Alarm Settings   Low Pressure (cmH2O) 13.5 cmH2O   High Pressure (cmH2O) 40 cmH2O   Low Minute Volume (lpm) 3 L/min   High Minute Volume (lpm) 18 L/min   Low Exhaled Vt (ml) 200 mL   High Exhaled Vt (ml) 800 mL   RR Low (bpm) 8   RR High (bpm) 40 br/min   Apnea (secs) 20 secs   Additional Respiratoray Assessments   Humidification Source Heated wire   Humidification Temp 36.1   Circuit Condensation Drained   Ambu Bag With Mask At Bedside Yes   Airway Clearance   Suction ET Tube   Suction Device Inline suction catheter   Sputum Method Obtained Oral   Sputum Amount Small   Sputum Color/Odor White   Sputum Consistency Thin   ETT    Placement Date/Time: 05/06/24 1841   Present on Admission/Arrival: No  Placed By: In ED;Licensed provider  Placement Verified By: Auscultation;Capnometry;Colorimetric ETCO2 device;Direct visualization  Preoxygenation: Yes  Mask Ventilation: Ventilated...   Secured At 24 cm   Measured From Lips   ETT Placement Center   Secured By Commercial tube haas   Site Assessment Cool;Dry   Cuff Pressure 27 cm H2O   Tie/Haas Changed No   Ventilator Associated Pneumonia Bundle   Elevation of Head of Bed to 30-45

## 2024-05-09 NOTE — PROGRESS NOTES
Nutrition Note    Presented with encephalopathy and hypoxia; resp failure and septic/hypovolemic shock. Emergently intubated  and transferred to ICU. Pt has been NPO since admit due to pressor requirements/lactate. Discussed care during interdisciplinary rounds. Per RN, pt weaned off levo, currently on SBT; has NGT to suction, no output this shift. Initiate TF per MD. Plan for Palliative Care follow up.     Estimated Nutrition Needs: 73.2 kg  1463 - 1788 kcals/day ([facility] 2003b x 0.9-1.1)  Minute Volume (L/min): 11.8 Liters  Temp: 97.7 °F (36.5 °C)  Temp (24hrs), Av.3 °F (36.3 °C), Min:96.4 °F (35.8 °C), Max:97.7 °F (36.5 °C)    88 - 146 g protein (1.2-2 g/kg)  1463 - 1788 ml fluid (1 ml/kcal)    Nutrition Recommendations/Plan:   Initiate tube feeding regimen:   Formula: Vital AF 1.2  Start at 20 ml/hr  Advance as tolerated by 10 ml q 8 hours  Goal Rate: 70 ml/hr x 20 hours (for anticipation of holding tube feeds for standard nursing care)  Water Flushes: 30 mL q 4 hours (180 mL total)  Goal Regimen Provides (with modulars):  1680 kcal, 105g protein, 1314 ml free water, 100% RDIs  Continue to monitor tolerance of EN, weight, labs, and plan of care during admission.      Electronically signed by Megan Dockweiler, MS, RD, CNSC on 2024 at 10:49 AM.    Contact: [phone]

## 2024-05-09 NOTE — PROGRESS NOTES
Will continue to monitor and assess respiratory vitals   05/09/24 1211   Vent Information   Vent Mode CPAP/PS   Ventilator Settings   PEEP/CPAP (cmH2O) 8   Vent Patient Data (Readings)   Vt Spont (mL) 510 mL   Vt Mandatory Exp (mL) 520 mL   Peak Inspiratory Pressure (cmH2O) 16 cmH2O   Rate Measured 19 br/min   Minute Volume (L/min) 9.61 Liters   Mean Airway Pressure (cmH2O) 11 cmH20   I:E Ratio 1:1.7   Flow Sensitivity 4 L/min   Insp Rise Time (%) 50 %   Backup Apnea On   Backup Rate 22 Breaths Per Minute   Airway Clearance   Suction ET Tube   Subglottic Suction Done Yes   Suction Device Inline suction catheter   Sputum Method Obtained Endotracheal   Sputum Amount Moderate   Sputum Color/Odor White;Tan   Sputum Consistency Thick   ETT    Placement Date/Time: 05/06/24 1841   Present on Admission/Arrival: No  Placed By: In ED;Licensed provider  Placement Verified By: Auscultation;Capnometry;Colorimetric ETCO2 device;Direct visualization  Preoxygenation: Yes  Mask Ventilation: Ventilated...   Secured At 24 cm   Measured From Lips   ETT Placement Left   Secured By Commercial tube haas   Site Assessment Cool;Dry   Cuff Pressure 27 cm H2O   Tie/Haas Changed No

## 2024-05-09 NOTE — WOUND CARE
4 Eyes Skin Assessment     NAME:  Paul Bobby  YOB: 1940  MEDICAL RECORD NUMBER:  [id number]    The patient is being assessed for  Other qshift and WC rounding    I agree that at least one RN has performed a thorough Head to Toe Skin Assessment on the patient. ALL assessment sites listed below have been assessed.      Areas assessed by both nurses:    Head, Face, Ears, Shoulders, Back, Chest, Sacrum. Buttock, Coccyx, Ischium, Legs. Feet and Heels, and Under Medical Devices         Does the Patient have a Wound? No noted wound(s)       Gildardo Prevention initiated by RN: No  Wound Care Orders initiated by RN: No    Pressure Injury (Stage 3,4, Unstageable, DTI, NWPT, and Complex wounds) if present, place Wound referral order by RN under : No    New Ostomies, if present place, Ostomy referral order under : No     Nurse 1 eSignature: Electronically signed by Renata Crowley RN on 5/9/24 at 2:08 PM EDT    **SHARE this note so that the co-signing nurse can place an eSignature**    Nurse 2 eSignature: Electronically signed by Prem Monsalve RN on 5/9/24 at 2:19 PM EDT

## 2024-05-09 NOTE — PROGRESS NOTES
I Isaiah Rodas RN ,Primary Nurse and Prem Monsalve, RAQUEL have reviewed the chart and verify that the restraint order matches that of the restraint documentation, the order is not , and documentation is complete per the type of restraints implemented based on policy and free from omissions.

## 2024-05-09 NOTE — PROGRESS NOTES
1930h received pateint on bed with ett ventialted, not in distress, with central line right femoral intact 3 way, ongoing precedex 0.4 mcg/kg/hr, levophed 2mcg/kg/min, with 2 preipheral line left brahial intact,  with both hand restraints for patient safety, with trejo catheter with yellowish urine output    2000h Shift assessment done, ngt intact connected to free flow, suctioning done with moderate amount loose whitish, repositioning rendered.     0730h Still intubated, sedated on dexmedetomidine, also still on levophed low dose.

## 2024-05-09 NOTE — WOUND CARE
SKIN HEALTH AND PREVENTION ROUNDING    Patient was observed and the following prevention interventions were noted: Turning [id number], Glide Sheet, Turning wedges, Limited layers, Silicone dressing to sacrum, Silicone dressing to heel(s), Heel prevention boots, and Incontinence managed    Primary RN present at bedside: Yes  Primary RN name: Prem GARCÍA    Interventions added: DALTON ZAMORAN, RN, WCC, COCN, CLIN IV  [facility] Wound Care Dept.  Office: [phone]  Work Cell: [phone]

## 2024-05-09 NOTE — INTERDISCIPLINARY ROUNDS
[facility]  Pulmonary, Critical Care, and Sleep Medicine  Interdisciplinary and Ventilator Weaning Rounds    Patient discussed in morning walking rounds and interdisciplinary rounds.    ICU admission day: 5/7    Overnight events:   No acute evnets    Assessments and best practice:  Ventilator  Ventilator Day(s): 4  Vent Settings  FiO2 : 50 %  Resp Rate (Set): 22 bpm  PEEP/CPAP (cmH2O): 8  Insp Time (sec): 0.8 sec  Insp Rise Time (%): 50 %  Flow Sensitivity: 4 L/min  Disconnect Sensitivity (%): 75 %  Expiratory Sensitivity (%): 25 %  Set Pressure: (S) 22 (found rate adjusted changed back to 16)   VAP bundle, aim to keep peak plateau pressure 25-30cm H2O  Weaning assessed and documented   Patient does notmeet criteria for SBT.   Patient is not on sedation holiday.  Plan to wean with PS n/a.  Outcome: full vent support   Final plan: full vent support  Glycemic control  Diet  Diet NPO  Stress ulcer prophylaxis.  Pepcid  DVT prophylaxis.  SQH  Need for Lines, trejo assessed.  Yes  Restraint Reevaluation   Yes  I have reevaluated the patient one hour after initiation of intervention. The patient is comfortable, uninjured, but continues to pose an imminent risk of injury to self to themselves and/or serious disruption of medical treatment required to keep patient stable. The patient's current medical and behavioral conditions that warrant the use intervention include Poor safety judgment:  Mild forgetfulness or impaired judgment and restricted weight-bearing status following orthopedic surgery and Poor safety judgment:  Impaired recall and forgetfulness to use of call light.  Restraint or seclusion will be discontinued at the earliest possible time, regardless of the scheduled expiration of the order. Based on my evaluation, restraints will be continued: Yes  Disposition regarding transferring out of the ICU  RED      Family contact/MPOA:   Family updated by ICU team    Palliative consult within 3  days of admission to ICU-  Ethics Guidance: 21 days      Daily Plans:  Palliative follow up     ISAIAH time 10 minutes        SAMIR GALEANO PA-C  05/09/24  Pulmonary, Critical Care Medicine  [facility]

## 2024-05-09 NOTE — PROGRESS NOTES
lytes, replace as needed.   Musc/Skin: no acute issues, wound care  Full code No additional code details  Discussed in interdisciplinary rounds     Best practice :    Glycemic control  IHI ICU bundles:   Central Line Bundle Followed , Trejo Bundle Followed, and Vent Bundle Followed, Vent Day 5/6/24  [facility] Vent patients-    VAP bundle-[place] tube to suction at 20-30 cm Hg, Maintain Noemi tube with 5-10ml air every 4 hours, Routine oral care every 4 hours, Elevation of head > 45 degree, Daily sedation holiday and SBT evaluation starting at 6.00am.  Stress ulcer prophylaxis.   Pepcid  DVT prophylaxis.   SQH  Need for Lines, trejo assessed.  Palliative care evaluation.  Restraints need.  Attending Non-violent Restraint Reevaluation     Patient does not require restraints     ISAIAH time 15 minutes    Barbara Downing PA-C  05/09/24  Pulmonary, Critical Care Medicine  [facility]         Attending Note:  I saw and evaluated the patient, performing all elements of service personally.  I discussed the findings, assessment and plan with the PA and agree with the PA's findings and plan as documented in the PA's note.  All edits and changes made above or are mentioned in my attending note which was independently assessed as well as written.    Total of 34 min critical care time spent at bedside (personally) during the course of care providing evaluation,management and care decisions and ordering appropriate treatment related to critical care problems exclusive of procedures.  I performed the substantive portion of this split shared encounter (greater than 50% of time)  The reason for providing this level of medical care for this critically ill patient was due a critical illness that impaired one or more vital organ systems such that there was a high probability of imminent or life threatening deterioration in the patients condition. This care involved high complexity decision making to assess, manipulate, and  support vital system functions, to treat this degree vital organ system failure and to prevent further life threatening deterioration of the patient’s condition.  This time was independent of other practitioners.      82 y/o male with a PMH of dementia, DM, HLD, COPD, in the [facility] program presented to [facility] ER with AMS and hypoxia. Pt was obtunded on arrival and hypoxic, patient was placed on the BiPAP in the ER despite poor mental status.  Prior to arrival, patient was vomiting.  CT scan showed bilateral lower lobe infiltrates.  Patient eventually intubated after suffering large vomiting event and unable to clear secretions--I am unsure why patient was on BiPAP given presentation.  Patient emergently intubated.  Patient in septic shock from aspiration pneumonia, weaning as tolerated.  Patient on broad-spectrum antibiotics.  I performed bedside bronchoscopy for aspiration, found purulent secretions in bilateral lower lobes, very extensive along with food-corn, lodged in right lower lobe.  Repeat bronchoscopy shows some remaining purulent secretions much less in amount, no food particles visualized.  Continue on airway clearance with MetaNeb and hypertonic saline.  Given poor functional status and ongoing aspiration, with severe toxic/metabolic encephalopathy, patient has high risk for recurrence and I recommend hospice/comfort measures--palliative care consulted.  Patient also has ESTRELLA, secondary to prerenal azotemia versus ischemic ATN.  Patient also found to have parainfluenza positive, however pneumonia secondary to aspiration pneumonia.  From my mechanical ventilation standpoint, patient doing well on SBT, however poor mental status, awaiting goals of care from palliative care.     Acute prognosis is poor, long-term prognosis is very poor given underlying dementia with severe aspiration pneumonia       Remi Mendez MD/MPH     Pulmonary, Critical Care Medicine  [facility]

## 2024-05-10 ENCOUNTER — APPOINTMENT (OUTPATIENT)
Facility: HOSPITAL | Age: 84
End: 2024-05-10
Payer: MEDICARE

## 2024-05-10 LAB
ANION GAP SERPL CALC-SCNC: 6 MMOL/L (ref 3–18)
ARTERIAL PATENCY WRIST A: POSITIVE
BACTERIA SPEC CULT: ABNORMAL
BACTERIA SPEC CULT: NORMAL
BASE DEFICIT BLD-SCNC: 3.7 MMOL/L
BASOPHILS # BLD: 0 K/UL (ref 0–0.1)
BASOPHILS NFR BLD: 0 % (ref 0–2)
BDY SITE: ABNORMAL
BUN SERPL-MCNC: 33 MG/DL (ref 7–18)
BUN/CREAT SERPL: 27 (ref 12–20)
CALCIUM SERPL-MCNC: 8.4 MG/DL (ref 8.5–10.1)
CHLORIDE SERPL-SCNC: 114 MMOL/L (ref 100–111)
CO2 SERPL-SCNC: 23 MMOL/L (ref 21–32)
CREAT SERPL-MCNC: 1.21 MG/DL (ref 0.6–1.3)
DIFFERENTIAL METHOD BLD: ABNORMAL
EOSINOPHIL # BLD: 0 K/UL (ref 0–0.4)
EOSINOPHIL NFR BLD: 0 % (ref 0–5)
ERYTHROCYTE [DISTWIDTH] IN BLOOD BY AUTOMATED COUNT: 13.9 % (ref 11.6–14.5)
GAS FLOW.O2 O2 DELIVERY SYS: ABNORMAL
GAS FLOW.O2 SETTING OXYMISER: 22 BPM
GLUCOSE BLD STRIP.AUTO-MCNC: 125 MG/DL (ref 70–110)
GLUCOSE BLD STRIP.AUTO-MCNC: 143 MG/DL (ref 70–110)
GLUCOSE BLD STRIP.AUTO-MCNC: 153 MG/DL (ref 70–110)
GLUCOSE SERPL-MCNC: 142 MG/DL (ref 74–99)
GRAM STN SPEC: ABNORMAL
GRAM STN SPEC: NORMAL
HCO3 BLD-SCNC: 21.1 MMOL/L (ref 22–26)
HCT VFR BLD AUTO: 32.4 % (ref 36–48)
HGB BLD-MCNC: 10.5 G/DL (ref 13–16)
IMM GRANULOCYTES # BLD AUTO: 0.1 K/UL (ref 0–0.04)
IMM GRANULOCYTES NFR BLD AUTO: 1 % (ref 0–0.5)
LYMPHOCYTES # BLD: 1.2 K/UL (ref 0.9–3.6)
LYMPHOCYTES NFR BLD: 10 % (ref 21–52)
MAGNESIUM SERPL-MCNC: 2.3 MG/DL (ref 1.6–2.6)
MCH RBC QN AUTO: 30.1 PG (ref 24–34)
MCHC RBC AUTO-ENTMCNC: 32.4 G/DL (ref 31–37)
MCV RBC AUTO: 92.8 FL (ref 78–100)
MONOCYTES # BLD: 0.7 K/UL (ref 0.05–1.2)
MONOCYTES NFR BLD: 6 % (ref 3–10)
NEUTS SEG # BLD: 10.2 K/UL (ref 1.8–8)
NEUTS SEG NFR BLD: 84 % (ref 40–73)
NRBC # BLD: 0 K/UL (ref 0–0.01)
NRBC BLD-RTO: 0 PER 100 WBC
O2/TOTAL GAS SETTING VFR VENT: 50 %
PCO2 BLD: 36.8 MMHG (ref 35–45)
PEEP RESPIRATORY: 6 CMH2O
PH BLD: 7.37 (ref 7.35–7.45)
PHOSPHATE SERPL-MCNC: 1.7 MG/DL (ref 2.5–4.9)
PLATELET # BLD AUTO: 186 K/UL (ref 135–420)
PMV BLD AUTO: 11 FL (ref 9.2–11.8)
PO2 BLD: 87 MMHG (ref 80–100)
POTASSIUM SERPL-SCNC: 3.8 MMOL/L (ref 3.5–5.5)
RBC # BLD AUTO: 3.49 M/UL (ref 4.35–5.65)
RESPIRATORY RATE, POC: 22 (ref 5–40)
SAO2 % BLD: 96.4 % (ref 92–97)
SERVICE CMNT-IMP: ABNORMAL
SERVICE CMNT-IMP: NORMAL
SODIUM SERPL-SCNC: 143 MMOL/L (ref 136–145)
SPECIMEN TYPE: ABNORMAL
VANCOMYCIN SERPL-MCNC: 15.6 UG/ML (ref 5–40)
VENTILATION MODE VENT: ABNORMAL
VT SETTING VENT: 500 ML
WBC # BLD AUTO: 12.3 K/UL (ref 4.6–13.2)

## 2024-05-10 PROCEDURE — 87186 SC STD MICRODIL/AGAR DIL: CPT

## 2024-05-10 PROCEDURE — 6370000000 HC RX 637 (ALT 250 FOR IP): Performed by: NURSE PRACTITIONER

## 2024-05-10 PROCEDURE — 80048 BASIC METABOLIC PNL TOTAL CA: CPT

## 2024-05-10 PROCEDURE — 85025 COMPLETE CBC W/AUTO DIFF WBC: CPT

## 2024-05-10 PROCEDURE — 87077 CULTURE AEROBIC IDENTIFY: CPT

## 2024-05-10 PROCEDURE — 2580000003 HC RX 258: Performed by: REGISTERED NURSE

## 2024-05-10 PROCEDURE — 87147 CULTURE TYPE IMMUNOLOGIC: CPT

## 2024-05-10 PROCEDURE — 84100 ASSAY OF PHOSPHORUS: CPT

## 2024-05-10 PROCEDURE — 2580000003 HC RX 258: Performed by: EMERGENCY MEDICINE

## 2024-05-10 PROCEDURE — 6360000002 HC RX W HCPCS: Performed by: NURSE PRACTITIONER

## 2024-05-10 PROCEDURE — 71045 X-RAY EXAM CHEST 1 VIEW: CPT

## 2024-05-10 PROCEDURE — 94669 MECHANICAL CHEST WALL OSCILL: CPT

## 2024-05-10 PROCEDURE — 6360000002 HC RX W HCPCS: Performed by: EMERGENCY MEDICINE

## 2024-05-10 PROCEDURE — 99291 CRITICAL CARE FIRST HOUR: CPT | Performed by: INTERNAL MEDICINE

## 2024-05-10 PROCEDURE — 6360000002 HC RX W HCPCS: Performed by: INTERNAL MEDICINE

## 2024-05-10 PROCEDURE — 2580000003 HC RX 258: Performed by: PHYSICIAN ASSISTANT

## 2024-05-10 PROCEDURE — 6360000002 HC RX W HCPCS

## 2024-05-10 PROCEDURE — 87070 CULTURE OTHR SPECIMN AEROBIC: CPT

## 2024-05-10 PROCEDURE — A4216 STERILE WATER/SALINE, 10 ML: HCPCS | Performed by: EMERGENCY MEDICINE

## 2024-05-10 PROCEDURE — 94761 N-INVAS EAR/PLS OXIMETRY MLT: CPT

## 2024-05-10 PROCEDURE — 31646 BRNCHSC W/THER ASPIR SBSQ: CPT | Performed by: INTERNAL MEDICINE

## 2024-05-10 PROCEDURE — 6360000002 HC RX W HCPCS: Performed by: REGISTERED NURSE

## 2024-05-10 PROCEDURE — 2500000003 HC RX 250 WO HCPCS: Performed by: EMERGENCY MEDICINE

## 2024-05-10 PROCEDURE — 94640 AIRWAY INHALATION TREATMENT: CPT

## 2024-05-10 PROCEDURE — 31624 DX BRONCHOSCOPE/LAVAGE: CPT | Performed by: INTERNAL MEDICINE

## 2024-05-10 PROCEDURE — 2500000003 HC RX 250 WO HCPCS: Performed by: PHYSICIAN ASSISTANT

## 2024-05-10 PROCEDURE — 83735 ASSAY OF MAGNESIUM: CPT

## 2024-05-10 PROCEDURE — 36600 WITHDRAWAL OF ARTERIAL BLOOD: CPT

## 2024-05-10 PROCEDURE — 2000000000 HC ICU R&B

## 2024-05-10 PROCEDURE — 82962 GLUCOSE BLOOD TEST: CPT

## 2024-05-10 PROCEDURE — 2700000000 HC OXYGEN THERAPY PER DAY

## 2024-05-10 PROCEDURE — 2580000003 HC RX 258: Performed by: INTERNAL MEDICINE

## 2024-05-10 PROCEDURE — 6370000000 HC RX 637 (ALT 250 FOR IP): Performed by: EMERGENCY MEDICINE

## 2024-05-10 PROCEDURE — 80202 ASSAY OF VANCOMYCIN: CPT

## 2024-05-10 PROCEDURE — 94003 VENT MGMT INPAT SUBQ DAY: CPT

## 2024-05-10 PROCEDURE — 31624 DX BRONCHOSCOPE/LAVAGE: CPT

## 2024-05-10 PROCEDURE — 82803 BLOOD GASES ANY COMBINATION: CPT

## 2024-05-10 PROCEDURE — 87205 SMEAR GRAM STAIN: CPT

## 2024-05-10 RX ORDER — BUDESONIDE 1 MG/2ML
1 INHALANT ORAL
Status: DISCONTINUED | OUTPATIENT
Start: 2024-05-10 | End: 2024-05-23

## 2024-05-10 RX ORDER — PROPOFOL 10 MG/ML
5-50 INJECTION, EMULSION INTRAVENOUS CONTINUOUS
Status: DISCONTINUED | OUTPATIENT
Start: 2024-05-10 | End: 2024-05-12

## 2024-05-10 RX ORDER — SODIUM CHLORIDE FOR INHALATION 3 %
4 VIAL, NEBULIZER (ML) INHALATION
Status: DISPENSED | OUTPATIENT
Start: 2024-05-10 | End: 2024-05-13

## 2024-05-10 RX ORDER — ALBUTEROL SULFATE 2.5 MG/3ML
2.5 SOLUTION RESPIRATORY (INHALATION)
Status: DISPENSED | OUTPATIENT
Start: 2024-05-10 | End: 2024-05-13

## 2024-05-10 RX ADMIN — ALBUTEROL SULFATE 2.5 MG: 2.5 SOLUTION RESPIRATORY (INHALATION) at 08:00

## 2024-05-10 RX ADMIN — FENTANYL CITRATE 50 MCG: 50 INJECTION INTRAMUSCULAR; INTRAVENOUS at 10:32

## 2024-05-10 RX ADMIN — ALBUTEROL SULFATE 2.5 MG: 2.5 SOLUTION RESPIRATORY (INHALATION) at 11:51

## 2024-05-10 RX ADMIN — SODIUM CHLORIDE SOLN NEBU 3% 4 ML: 3 NEBU SOLN at 15:56

## 2024-05-10 RX ADMIN — FAMOTIDINE 20 MG: 10 INJECTION INTRAVENOUS at 09:00

## 2024-05-10 RX ADMIN — SODIUM CHLORIDE SOLN NEBU 3% 4 ML: 3 NEBU SOLN at 11:51

## 2024-05-10 RX ADMIN — THIAMINE HYDROCHLORIDE 100 MG: 100 INJECTION, SOLUTION INTRAMUSCULAR; INTRAVENOUS at 09:00

## 2024-05-10 RX ADMIN — DOCUSATE SODIUM LIQUID 100 MG: 100 LIQUID ORAL at 21:06

## 2024-05-10 RX ADMIN — ALBUTEROL SULFATE 2.5 MG: 2.5 SOLUTION RESPIRATORY (INHALATION) at 15:56

## 2024-05-10 RX ADMIN — CHLORHEXIDINE GLUCONATE 15 ML: 1.2 RINSE ORAL at 21:06

## 2024-05-10 RX ADMIN — POLYETHYLENE GLYCOL 3350 34 G: 17 POWDER, FOR SOLUTION ORAL at 09:00

## 2024-05-10 RX ADMIN — CHLORHEXIDINE GLUCONATE 15 ML: 1.2 RINSE ORAL at 09:04

## 2024-05-10 RX ADMIN — HEPARIN SODIUM 5000 UNITS: 5000 INJECTION INTRAVENOUS; SUBCUTANEOUS at 22:30

## 2024-05-10 RX ADMIN — SODIUM CHLORIDE SOLN NEBU 3% 4 ML: 3 NEBU SOLN at 20:13

## 2024-05-10 RX ADMIN — BUDESONIDE 1 MG: 1 SUSPENSION RESPIRATORY (INHALATION) at 20:14

## 2024-05-10 RX ADMIN — FENTANYL CITRATE 50 MCG: 50 INJECTION INTRAMUSCULAR; INTRAVENOUS at 10:49

## 2024-05-10 RX ADMIN — PIPERACILLIN AND TAZOBACTAM 3375 MG: 3; .375 INJECTION, POWDER, FOR SOLUTION INTRAVENOUS at 04:15

## 2024-05-10 RX ADMIN — VANCOMYCIN HYDROCHLORIDE 1500 MG: 10 INJECTION, POWDER, LYOPHILIZED, FOR SOLUTION INTRAVENOUS at 12:30

## 2024-05-10 RX ADMIN — PIPERACILLIN AND TAZOBACTAM 3375 MG: 3; .375 INJECTION, POWDER, FOR SOLUTION INTRAVENOUS at 12:22

## 2024-05-10 RX ADMIN — Medication 5 MCG/MIN: at 10:08

## 2024-05-10 RX ADMIN — POLYETHYLENE GLYCOL 3350 34 G: 17 POWDER, FOR SOLUTION ORAL at 21:06

## 2024-05-10 RX ADMIN — POTASSIUM PHOSPHATE, MONOBASIC AND POTASSIUM PHOSPHATE, DIBASIC 20 MMOL: 224; 236 INJECTION, SOLUTION, CONCENTRATE INTRAVENOUS at 09:01

## 2024-05-10 RX ADMIN — DOCUSATE SODIUM LIQUID 100 MG: 100 LIQUID ORAL at 09:00

## 2024-05-10 RX ADMIN — ALBUTEROL SULFATE 2.5 MG: 2.5 SOLUTION RESPIRATORY (INHALATION) at 20:13

## 2024-05-10 RX ADMIN — PROPOFOL 20 MCG/KG/MIN: 10 INJECTION, EMULSION INTRAVENOUS at 10:04

## 2024-05-10 RX ADMIN — BUDESONIDE 1 MG: 1 SUSPENSION RESPIRATORY (INHALATION) at 08:00

## 2024-05-10 RX ADMIN — PIPERACILLIN AND TAZOBACTAM 3375 MG: 3; .375 INJECTION, POWDER, FOR SOLUTION INTRAVENOUS at 19:30

## 2024-05-10 RX ADMIN — HEPARIN SODIUM 5000 UNITS: 5000 INJECTION INTRAVENOUS; SUBCUTANEOUS at 06:52

## 2024-05-10 RX ADMIN — HEPARIN SODIUM 5000 UNITS: 5000 INJECTION INTRAVENOUS; SUBCUTANEOUS at 14:30

## 2024-05-10 RX ADMIN — SODIUM CHLORIDE SOLN NEBU 3% 4 ML: 3 NEBU SOLN at 08:00

## 2024-05-10 ASSESSMENT — PULMONARY FUNCTION TESTS
PIF_VALUE: 15
PIF_VALUE: 15
PIF_VALUE: 13
PIF_VALUE: 17
PIF_VALUE: 22
PIF_VALUE: 22

## 2024-05-10 ASSESSMENT — PAIN SCALES - GENERAL
PAINLEVEL_OUTOF10: 0

## 2024-05-10 NOTE — PROGRESS NOTES
[facility]   Pharmacy Pharmacokinetic Monitoring Service - Vancomycin    Indication: sepsis  Goal level: 10-20 mg/L  Day of Therapy: 5  Additional Antimicrobials: pip-tazo    Pertinent Laboratory Values:   Wt Readings from Last 1 Encounters:   05/10/24 74.8 kg (165 lb)     Temp Readings from Last 1 Encounters:   05/10/24 98.7 °F (37.1 °C) (Axillary)     Estimated Creatinine Clearance: 48 mL/min (based on SCr of 1.21 mg/dL).    Recent Labs     05/09/24  0310 05/10/24  0207   CREATININE 1.36* 1.21   BUN 26* 33*   WBC 11.2 12.3     Pertinent Cultures:  Date Source Results   5/6 blood CoNS in 1/2 5/7 ET aspirate GPC, yeast   5/7 ET aspirate GPC, yeast   5/8 blood NGTD   5/8 BAL pending   MRSA Nasal Swab: present    Assessment:  Date Current Dose Level (mg/L) Timing of Level (h)   5/6 1,750 mg x1 - -   5/7 - - -   5/8 1,500 mg x1 5.4 32   5/9 1,250 mg x1 10.9 17   5/10 1,500 mg x1 15.6 15     Plan:  Dose by level  Dose: 1,500 mg x1  Ordered a level for 5/11 with AM labs  Pharmacy will continue to monitor patient and adjust therapy as indicated    Thank you for the consult,  Toy Velasquez [facility]  5/10/2024

## 2024-05-10 NOTE — PROGRESS NOTES
PCCM update:      Provided medical update to daughter Aimee over the phone. Questions were answered to the best of my knowledge.         Trish Santos PA-C  05/10/24  Pulmonary, Critical Care Medicine  [facility]

## 2024-05-10 NOTE — PROGRESS NOTES
05/10/24 0414 05/10/24 0613   Patient Observation   Pulse 59  --    Respirations 22  --    SpO2 100 % (!) 80 %   Breath Sounds   Breath Sounds Bilateral Diminished  --    Vent Information   Vent Mode AC/PRVC  --    Ventilator Settings   FiO2  50 %  --    Insp Time (sec) 0.8 sec  --    Resp Rate (Set) 22 bpm  --    PEEP/CPAP (cmH2O) 6  --    Target Vt 500  --    Vent Patient Data (Readings)   Vt Mandatory Exp (mL) 500 mL  --    Vt (Measured) 524 mL  --    Peak Inspiratory Pressure (cmH2O) 22 cmH2O  --    Rate Measured 22 br/min  --    Minute Volume (L/min) 11.7 Liters  --    Mean Airway Pressure (cmH2O) 11 cmH20  --    Inspiratory Time 0.8 sec  --    I:E Ratio 1:2.4  --    Flow Sensitivity 4 L/min  --    Disconnect Sensitivity (%) 75 %  --    Insp Rise Time (%) 50 %  --    Backup Apnea On  --    Backup Rate 22 Breaths Per Minute  --    Backup Vt 500  --    Backup I Time   (0.8)  --    Vent Alarm Settings   Low Pressure (cmH2O) 11.5 cmH2O  --    High Pressure (cmH2O) 40 cmH2O  --    Low Minute Volume (lpm) 2 L/min  --    High Minute Volume (lpm) 20 L/min  --    Low Exhaled Vt (ml) 200 mL  --    High Exhaled Vt (ml) 800 mL  --    RR High (bpm) 40 br/min  --    Apnea (secs) 20 secs  --

## 2024-05-10 NOTE — PROCEDURES
[facility]  Pulmonary, Critical Care, and Sleep Medicine    Name: Paul Bobby MRN: [id number]   : 1940 Hospital: [facility]   Date: 5/10/2024        Bronchoscopy Report    Procedure:  Diagnostic  bronchoscopy.    Indication:  Aspiration and mucous plugging    Timeout verified the correct patient and correct procedure.     Anesthesia:   Patient on ventilator and receiving  Fentanyl and propofol    Procedure Details:   -- The bronchoscope was introduced through an endotracheal tube.   -- The trachea and toni were completely inspected and were found to be normal  -- The airways were diffusely inflamed throughout  -- The right-sided endobronchial anatomy was completely inspected and showed some mucus plugging and purulent secretions in the distal RLL lateral and posterior segments which were lavaged and aspirated as completely as possible.  No remaining food particles were found.  -- The left-sided endobronchial anatomy was completely inspected and was found some mucus plugging and purulent secretions in the distal RLL posterior segment.      Specimens:   The bronchoscope was wedged in the RLL and bronchoalveolar lavage was performed; material was sent for  microbiology    Complications: none    Estimated Blood Loss: Minimal        Remi Mendez MD/MPH     Pulmonary, Critical Care Medicine  [facility]

## 2024-05-10 NOTE — PROGRESS NOTES
[facility].  Pulmonary, Critical Care, and Sleep Medicine    Name: Paul Bobby MRN: [id number]   : 1940 Hospital: [facility]   Date: 5/10/2024  Admission Date: 2024     Chart and notes reviewed. Data reviewed. I have evaluated all findings.    [x]I have reviewed the flowsheet and previous day’s notes.    [x]The patient is unable to give any meaningful history or review of systems because the patient is:  [x]Intubated [x]Sedated   []Unresponsive      [x]The patient is critically ill on      [x]Mechanical ventilation [x]Pressors   []BiPAP []         Interval HPI:]  Patient is a 83 y.o. male with a PMHx of dementia, DM II, HLD, and  COPD who presented to [facility] ER with encephalopathy and hypoxia. Per EMS, patient PACE and was evaluated by the nurse practitioner who found him altered and vomiting. She was concerned he aspirated on his lunch and called EMS. On arrival to ER, he was on NRB with oxygen saturation In the 70's and tachypneic. Patient was initially placed on Bipap by RT with little improvement of oxygenation. ABG was done and demonstrated metabolic acidosis, no hypercapnia. D/w ED physician, patient was placed on Bipap without medical update that patient had been vomiting prior to arrival. Update was later provided by the daughter and patient was removed from Bipap.  Decision was made to emergently intubate patient, however, prior to intubation patient suffered from large vomiting episode with inability to clear secretions. Apparently patient was not on Bipap at this time, unclear when patient was taken off. Patient continued with respiratory distress and lethargy, so patient was emergently intubated. Patient became hypotensive, refractory to IVF resuscitation and CVL was placed. Levophed was started. S/p bronch  which demonstrated mucous plugging, purulent secretions, and corn kernel particles.     Subjective 05/10/24  Hospital Day: 24   Vent Day:  Trend WBCs and temperature curve. RVP + parainfluenza, acetaminophen, salicylate, and ethanol level negative   Endocrine: Q6 glucoses, SSI. TSH level 9.37 , free T4 1.4   Metabolic:  Daily BMP, mag, phos. Trend lytes, replace as needed.   Musc/Skin: no acute issues, wound care  Full code No additional code details  Discussed in interdisciplinary rounds     Best practice :    Glycemic control  IHI ICU bundles:   Central Line Bundle Followed , Trejo Bundle Followed, and Vent Bundle Followed, Vent Day 5/6/24  [facility] Vent patients-    VAP bundle-[place] tube to suction at 20-30 cm Hg, Maintain Noemi tube with 5-10ml air every 4 hours, Routine oral care every 4 hours, Elevation of head > 45 degree, Daily sedation holiday and SBT evaluation starting at 6.00am.  Stress ulcer prophylaxis.   Pepcid  DVT prophylaxis.   SQH  Need for Lines, trejo assessed.  Palliative care evaluation.  Restraints need.  Attending Non-violent Restraint Reevaluation     Patient does not require restraints     ISAIAH time 15 minutes    Barbara Downing PA-C  05/10/24  Pulmonary, Critical Care Medicine  [facility]         Attending Note:  I saw and evaluated the patient, performing all elements of service personally.  I discussed the findings, assessment and plan with the PA and agree with the PA's findings and plan as documented in the PA's note.  All edits and changes made above or are mentioned in my attending note which was independently assessed as well as written.    Total of 33 min critical care time spent at bedside (personally) during the course of care providing evaluation,management and care decisions and ordering appropriate treatment related to critical care problems exclusive of procedures.  I performed the substantive portion of this split shared encounter (greater than 50% of time)  The reason for providing this level of medical care for this critically ill patient was due a critical illness that impaired one or more

## 2024-05-10 NOTE — INTERDISCIPLINARY ROUNDS
[facility]  Pulmonary, Critical Care, and Sleep Medicine  Interdisciplinary and Ventilator Weaning Rounds    Patient discussed in morning walking rounds and interdisciplinary rounds.    ICU admission day: 5/7    Overnight events:   No acute evnets    Assessments and best practice:  Ventilator  Ventilator Day(s): 5  Vent Settings  FiO2 : 50 %  Resp Rate (Set): 22 bpm  PEEP/CPAP (cmH2O): 6  Insp Time (sec): 0.8 sec  Insp Rise Time (%): 50 %  Flow Sensitivity: 4 L/min  Disconnect Sensitivity (%): 75 %  Expiratory Sensitivity (%): 25 %  Set Pressure: (S) 22 (found rate adjusted changed back to 16)   VAP bundle, aim to keep peak plateau pressure 25-30cm H2O  Weaning assessed and documented   Patient does notmeet criteria for SBT.   Patient is not on sedation holiday.  Plan to wean with PS n/a.  Outcome: full vent support   Final plan: full vent support  Glycemic control  Diet  Diet NPO  ADULT TUBE FEEDING; Orogastric; Peptide Based; Continuous; 20; Yes; 10; Q 8 hours; 70; 30; Q 4 hours  Stress ulcer prophylaxis.  Pepcid  DVT prophylaxis.  SQH  Need for Lines, trejo assessed.  Yes  Restraint Reevaluation   Yes  I have reevaluated the patient one hour after initiation of intervention. The patient is comfortable, uninjured, but continues to pose an imminent risk of injury to self to themselves and/or serious disruption of medical treatment required to keep patient stable. The patient's current medical and behavioral conditions that warrant the use intervention include Poor safety judgment:  Mild forgetfulness or impaired judgment and restricted weight-bearing status following orthopedic surgery and Poor safety judgment:  Impaired recall and forgetfulness to use of call light.  Restraint or seclusion will be discontinued at the earliest possible time, regardless of the scheduled expiration of the order. Based on my evaluation, restraints will be continued: Yes  Disposition regarding transferring out of

## 2024-05-10 NOTE — PROGRESS NOTES
05/10/24 0414   Patient Observation   Pulse 59   Respirations 22   SpO2 100 %   Breath Sounds   Breath Sounds Bilateral Diminished   Vent Information   Vent Mode AC/PRVC   Ventilator Settings   FiO2  50 %   Insp Time (sec) 0.8 sec   Resp Rate (Set) 22 bpm   PEEP/CPAP (cmH2O) 6   Target Vt 500   Vent Patient Data (Readings)   Vt Mandatory Exp (mL) 500 mL   Vt (Measured) 524 mL   Peak Inspiratory Pressure (cmH2O) 22 cmH2O   Rate Measured 22 br/min   Minute Volume (L/min) 11.7 Liters   Mean Airway Pressure (cmH2O) 11 cmH20   Inspiratory Time 0.8 sec   I:E Ratio 1:2.4   Flow Sensitivity 4 L/min   Disconnect Sensitivity (%) 75 %   Insp Rise Time (%) 50 %   Backup Apnea On   Backup Rate 22 Breaths Per Minute   Backup Vt 500   Backup I Time   (0.8)   Vent Alarm Settings   Low Pressure (cmH2O) 11.5 cmH2O   High Pressure (cmH2O) 40 cmH2O   Low Minute Volume (lpm) 2 L/min   High Minute Volume (lpm) 20 L/min   Low Exhaled Vt (ml) 200 mL   High Exhaled Vt (ml) 800 mL   RR High (bpm) 40 br/min   Apnea (secs) 20 secs     Patient had an uneventful night. Responds appropriately to stimulation.  Mucus is white tan with a tinge pink noted.

## 2024-05-10 NOTE — PROGRESS NOTES
VENTILATOR CARE PLAN    Problem: Ventilator Management  Goal: *Adequate oxygenation/ ventilation/ and extubation      Patient:        Paul Bobby     83 y.o.   male     5/10/2024  6:21 AM  Patient Active Problem List   Diagnosis    Weakness    Syncope    Dementia (HCC)    AMS (altered mental status)    Acute respiratory failure (HCC)    Aspiration pneumonia of both lungs due to vomit (HCC)    Aspiration of food    Mucus plugging of bronchi    Acute encephalopathy    Acute hypoxemic respiratory failure (HCC)    ESTRELLA (acute kidney injury) (HCC)    Septic shock (HCC)    Lactic acidosis    Multifocal pneumonia    Metabolic acidosis       Acute respiratory failure (HCC) [J96.00]  Altered mental status, unspecified altered mental status type [R41.82]  Acute hypoxemic respiratory failure (HCC) [J96.01]  AMS (altered mental status) [R41.82]    Reason patient intubated: AMS, Vomiting, unable to manage secretions, and hpoxia    Ventilator day: 5    Ventilator settings: 500 22 6 50    ETT Size/Placement:8.0 24 @TL     Wean Screen Pass (Yes or No):  Wean Screen Reason for Failure:  Duration of Weaning Trial: 5 hrs yesterday  Additional Comments:        PLAN OF CARE: To rest overnight and to do spontaneous breath trial in the AM       GOAL: Extubation soon      OUTCOME:     AB.37 37 87 21 -3.7 96  Date:5/10/2024  @[id number](ph,phi,pco2,pco2i,po2,po2i,hco3,hco3i,fio2,fio2i)@    Chest X-ray:  Date:5/10/2024  @BSHSILASTIMGCAT(ZWR7355:1)@    Lab Test:  Date:5/10/2024  WBC:   Lab Results   Component Value Date/Time    WBC 12.3 05/10/2024 02:07 AM   HGB:   Lab Results   Component Value Date/Time    HGB 10.5 05/10/2024 02:07 AM    PLTS:   Lab Results   Component Value Date/Time     05/10/2024 02:07 AM         Vital Signs:   Patient Vitals for the past 8 hrs:   Temp Pulse Resp BP SpO2   05/10/24 0414 -- 59 22 -- 100 %   05/10/24 0300 -- 60 22 106/64 --   05/10/24 0130 -- 62 13 121/71 97 %   05/10/24 0100 -- 66 15 120/70

## 2024-05-10 NOTE — CARE COORDINATION
05/07/24 1144   /Social Work Whiteboard Notes   /Social Work Whiteboard RED 5/10 From Home with dtr, pt participates in the PACE program. pt intubated         MORE Bae  Case Management Department

## 2024-05-10 NOTE — PLAN OF CARE
status  Outcome: Progressing  Flowsheets (Taken 5/9/2024 1050 by Dockweiler, Megan, RD)  Nutrient intake appropriate for improving, restoring, or maintaining nutritional needs:   Assess nutritional status and recommend course of action   Monitor oral intake, labs, and treatment plans   Recommend appropriate diets, oral nutritional supplements, and vitamin/mineral supplements   Recommend, monitor, and adjust tube feedings and TPN/PPN based on assessed needs     Problem: Safety - Medical Restraint  Goal: Remains free of injury from restraints (Restraint for Interference with Medical Device)  Description: INTERVENTIONS:  1. Determine that other, less restrictive measures have been tried or would not be effective before applying the restraint  2. Evaluate the patient's condition at the time of restraint application  3. Inform patient/family regarding the reason for restraint  4. Q2H: Monitor safety, psychosocial status, comfort, nutrition and hydration  Outcome: Not Progressing  Flowsheets  Taken 5/9/2024 2000 by Beverly Valenzuela RN  Remains free of injury from restraints (restraint for interference with medical device): Determine that other, less restrictive measures have been tried or would not be effective before applying the restraint  Taken 5/9/2024 1800 by Prem Monsalve RN  Remains free of injury from restraints (restraint for interference with medical device):   Determine that other, less restrictive measures have been tried or would not be effective before applying the restraint   Evaluate the patient's condition at the time of restraint application   Inform patient/family regarding the reason for restraint   Every 2 hours: Monitor safety, psychosocial status, comfort, nutrition and hydration  Taken 5/9/2024 1600 by Prem Monsalve RN  Remains free of injury from restraints (restraint for interference with medical device):   Determine that other, less restrictive measures have been tried or would  not be effective before applying the restraint   Evaluate the patient's condition at the time of restraint application   Every 2 hours: Monitor safety, psychosocial status, comfort, nutrition and hydration   Inform patient/family regarding the reason for restraint  Taken 5/9/2024 1400 by Prem Monsalve RN  Remains free of injury from restraints (restraint for interference with medical device):   Evaluate the patient's condition at the time of restraint application   Determine that other, less restrictive measures have been tried or would not be effective before applying the restraint   Inform patient/family regarding the reason for restraint   Every 2 hours: Monitor safety, psychosocial status, comfort, nutrition and hydration  Taken 5/9/2024 1200 by Prem Monsalve RN  Remains free of injury from restraints (restraint for interference with medical device):   Determine that other, less restrictive measures have been tried or would not be effective before applying the restraint   Evaluate the patient's condition at the time of restraint application   Inform patient/family regarding the reason for restraint   Every 2 hours: Monitor safety, psychosocial status, comfort, nutrition and hydration  Taken 5/9/2024 1000 by Prem Monsalve RN  Remains free of injury from restraints (restraint for interference with medical device):   Determine that other, less restrictive measures have been tried or would not be effective before applying the restraint   Evaluate the patient's condition at the time of restraint application   Inform patient/family regarding the reason for restraint   Every 2 hours: Monitor safety, psychosocial status, comfort, nutrition and hydration     Problem: Discharge Planning  Goal: Discharge to home or other facility with appropriate resources  Outcome: Not Progressing

## 2024-05-10 NOTE — PROGRESS NOTES
No SBT at this time-     05/10/24 0809   Weaning Parameters   Spontaneous Breathing Trial Complete (S)  No (comment)  (High FiO2 with p/f ratio 174.)

## 2024-05-11 ENCOUNTER — APPOINTMENT (OUTPATIENT)
Facility: HOSPITAL | Age: 84
End: 2024-05-11
Payer: MEDICARE

## 2024-05-11 LAB
ANION GAP SERPL CALC-SCNC: 3 MMOL/L (ref 3–18)
ARTERIAL PATENCY WRIST A: POSITIVE
BASE DEFICIT BLD-SCNC: 3.5 MMOL/L
BASOPHILS # BLD: 0.1 K/UL (ref 0–0.1)
BASOPHILS NFR BLD: 1 % (ref 0–2)
BDY SITE: ABNORMAL
BUN SERPL-MCNC: 24 MG/DL (ref 7–18)
BUN/CREAT SERPL: 22 (ref 12–20)
CALCIUM SERPL-MCNC: 8.1 MG/DL (ref 8.5–10.1)
CHLORIDE SERPL-SCNC: 117 MMOL/L (ref 100–111)
CO2 SERPL-SCNC: 24 MMOL/L (ref 21–32)
CREAT SERPL-MCNC: 1.11 MG/DL (ref 0.6–1.3)
DIFFERENTIAL METHOD BLD: ABNORMAL
EOSINOPHIL # BLD: 0 K/UL (ref 0–0.4)
EOSINOPHIL NFR BLD: 0 % (ref 0–5)
ERYTHROCYTE [DISTWIDTH] IN BLOOD BY AUTOMATED COUNT: 14.2 % (ref 11.6–14.5)
GAS FLOW.O2 O2 DELIVERY SYS: ABNORMAL
GAS FLOW.O2 SETTING OXYMISER: 22 BPM
GLUCOSE BLD STRIP.AUTO-MCNC: 131 MG/DL (ref 70–110)
GLUCOSE BLD STRIP.AUTO-MCNC: 133 MG/DL (ref 70–110)
GLUCOSE SERPL-MCNC: 159 MG/DL (ref 74–99)
HCO3 BLD-SCNC: 21.5 MMOL/L (ref 22–26)
HCT VFR BLD AUTO: 31.3 % (ref 36–48)
HGB BLD-MCNC: 10.3 G/DL (ref 13–16)
IMM GRANULOCYTES # BLD AUTO: 0.1 K/UL (ref 0–0.04)
IMM GRANULOCYTES NFR BLD AUTO: 1 % (ref 0–0.5)
LYMPHOCYTES # BLD: 1.5 K/UL (ref 0.9–3.6)
LYMPHOCYTES NFR BLD: 20 % (ref 21–52)
MAGNESIUM SERPL-MCNC: 2.1 MG/DL (ref 1.6–2.6)
MCH RBC QN AUTO: 31.1 PG (ref 24–34)
MCHC RBC AUTO-ENTMCNC: 32.9 G/DL (ref 31–37)
MCV RBC AUTO: 94.6 FL (ref 78–100)
MONOCYTES # BLD: 0.9 K/UL (ref 0.05–1.2)
MONOCYTES NFR BLD: 12 % (ref 3–10)
NEUTS SEG # BLD: 5.1 K/UL (ref 1.8–8)
NEUTS SEG NFR BLD: 67 % (ref 40–73)
NRBC # BLD: 0 K/UL (ref 0–0.01)
NRBC BLD-RTO: 0 PER 100 WBC
O2/TOTAL GAS SETTING VFR VENT: 45 %
PCO2 BLD: 38.2 MMHG (ref 35–45)
PEEP RESPIRATORY: 6 CMH2O
PH BLD: 7.36 (ref 7.35–7.45)
PHOSPHATE SERPL-MCNC: 2.3 MG/DL (ref 2.5–4.9)
PLATELET # BLD AUTO: 180 K/UL (ref 135–420)
PMV BLD AUTO: 10.8 FL (ref 9.2–11.8)
PO2 BLD: 100 MMHG (ref 80–100)
POTASSIUM SERPL-SCNC: 3.6 MMOL/L (ref 3.5–5.5)
RBC # BLD AUTO: 3.31 M/UL (ref 4.35–5.65)
RESPIRATORY RATE, POC: 24 (ref 5–40)
SAO2 % BLD: 97.5 % (ref 92–97)
SERVICE CMNT-IMP: ABNORMAL
SODIUM SERPL-SCNC: 144 MMOL/L (ref 136–145)
SPECIMEN TYPE: ABNORMAL
VANCOMYCIN SERPL-MCNC: 12.4 UG/ML (ref 5–40)
VENTILATION MODE VENT: ABNORMAL
VT SETTING VENT: 500 ML
WBC # BLD AUTO: 7.6 K/UL (ref 4.6–13.2)

## 2024-05-11 PROCEDURE — 97163 PT EVAL HIGH COMPLEX 45 MIN: CPT

## 2024-05-11 PROCEDURE — 2580000003 HC RX 258: Performed by: EMERGENCY MEDICINE

## 2024-05-11 PROCEDURE — 2700000000 HC OXYGEN THERAPY PER DAY

## 2024-05-11 PROCEDURE — 82962 GLUCOSE BLOOD TEST: CPT

## 2024-05-11 PROCEDURE — 6360000002 HC RX W HCPCS: Performed by: REGISTERED NURSE

## 2024-05-11 PROCEDURE — 99291 CRITICAL CARE FIRST HOUR: CPT | Performed by: INTERNAL MEDICINE

## 2024-05-11 PROCEDURE — 36600 WITHDRAWAL OF ARTERIAL BLOOD: CPT

## 2024-05-11 PROCEDURE — APPSS15 APP SPLIT SHARED TIME 0-15 MINUTES: Performed by: PHYSICIAN ASSISTANT

## 2024-05-11 PROCEDURE — 94640 AIRWAY INHALATION TREATMENT: CPT

## 2024-05-11 PROCEDURE — 2000000000 HC ICU R&B

## 2024-05-11 PROCEDURE — 6370000000 HC RX 637 (ALT 250 FOR IP): Performed by: PHYSICIAN ASSISTANT

## 2024-05-11 PROCEDURE — 6370000000 HC RX 637 (ALT 250 FOR IP): Performed by: EMERGENCY MEDICINE

## 2024-05-11 PROCEDURE — 94003 VENT MGMT INPAT SUBQ DAY: CPT

## 2024-05-11 PROCEDURE — 85025 COMPLETE CBC W/AUTO DIFF WBC: CPT

## 2024-05-11 PROCEDURE — 82803 BLOOD GASES ANY COMBINATION: CPT

## 2024-05-11 PROCEDURE — 84100 ASSAY OF PHOSPHORUS: CPT

## 2024-05-11 PROCEDURE — 94761 N-INVAS EAR/PLS OXIMETRY MLT: CPT

## 2024-05-11 PROCEDURE — 6360000002 HC RX W HCPCS: Performed by: INTERNAL MEDICINE

## 2024-05-11 PROCEDURE — 71045 X-RAY EXAM CHEST 1 VIEW: CPT

## 2024-05-11 PROCEDURE — 6360000002 HC RX W HCPCS: Performed by: PHYSICIAN ASSISTANT

## 2024-05-11 PROCEDURE — A4216 STERILE WATER/SALINE, 10 ML: HCPCS | Performed by: EMERGENCY MEDICINE

## 2024-05-11 PROCEDURE — 2500000003 HC RX 250 WO HCPCS: Performed by: EMERGENCY MEDICINE

## 2024-05-11 PROCEDURE — 94669 MECHANICAL CHEST WALL OSCILL: CPT

## 2024-05-11 PROCEDURE — 2580000003 HC RX 258: Performed by: INTERNAL MEDICINE

## 2024-05-11 PROCEDURE — 80048 BASIC METABOLIC PNL TOTAL CA: CPT

## 2024-05-11 PROCEDURE — 83735 ASSAY OF MAGNESIUM: CPT

## 2024-05-11 PROCEDURE — 94668 MNPJ CHEST WALL SBSQ: CPT

## 2024-05-11 PROCEDURE — 2580000003 HC RX 258: Performed by: REGISTERED NURSE

## 2024-05-11 PROCEDURE — 6370000000 HC RX 637 (ALT 250 FOR IP): Performed by: NURSE PRACTITIONER

## 2024-05-11 PROCEDURE — 80202 ASSAY OF VANCOMYCIN: CPT

## 2024-05-11 PROCEDURE — 6360000002 HC RX W HCPCS: Performed by: NURSE PRACTITIONER

## 2024-05-11 RX ORDER — POTASSIUM CHLORIDE 29.8 MG/ML
20 INJECTION INTRAVENOUS ONCE
Status: COMPLETED | OUTPATIENT
Start: 2024-05-11 | End: 2024-05-11

## 2024-05-11 RX ORDER — VANCOMYCIN 1.75 G/350ML
1250 INJECTION, SOLUTION INTRAVENOUS
Status: DISCONTINUED | OUTPATIENT
Start: 2024-05-11 | End: 2024-05-16

## 2024-05-11 RX ADMIN — SODIUM CHLORIDE SOLN NEBU 3% 4 ML: 3 NEBU SOLN at 16:48

## 2024-05-11 RX ADMIN — SODIUM CHLORIDE SOLN NEBU 3% 4 ML: 3 NEBU SOLN at 09:03

## 2024-05-11 RX ADMIN — POTASSIUM CHLORIDE 20 MEQ: 29.8 INJECTION, SOLUTION INTRAVENOUS at 06:36

## 2024-05-11 RX ADMIN — ALBUTEROL SULFATE 2.5 MG: 2.5 SOLUTION RESPIRATORY (INHALATION) at 00:14

## 2024-05-11 RX ADMIN — PIPERACILLIN AND TAZOBACTAM 3375 MG: 3; .375 INJECTION, POWDER, FOR SOLUTION INTRAVENOUS at 03:44

## 2024-05-11 RX ADMIN — SODIUM CHLORIDE SOLN NEBU 3% 4 ML: 3 NEBU SOLN at 04:26

## 2024-05-11 RX ADMIN — THIAMINE HYDROCHLORIDE 100 MG: 100 INJECTION, SOLUTION INTRAMUSCULAR; INTRAVENOUS at 08:55

## 2024-05-11 RX ADMIN — ACETAMINOPHEN 650 MG: 325 TABLET ORAL at 18:45

## 2024-05-11 RX ADMIN — POTASSIUM & SODIUM PHOSPHATES POWDER PACK 280-160-250 MG 250 MG: 280-160-250 PACK at 08:56

## 2024-05-11 RX ADMIN — PIPERACILLIN AND TAZOBACTAM 3375 MG: 3; .375 INJECTION, POWDER, FOR SOLUTION INTRAVENOUS at 19:51

## 2024-05-11 RX ADMIN — VANCOMYCIN 1250 MG: 1.75 INJECTION, SOLUTION INTRAVENOUS at 11:33

## 2024-05-11 RX ADMIN — HEPARIN SODIUM 5000 UNITS: 5000 INJECTION INTRAVENOUS; SUBCUTANEOUS at 22:11

## 2024-05-11 RX ADMIN — SODIUM CHLORIDE SOLN NEBU 3% 4 ML: 3 NEBU SOLN at 00:14

## 2024-05-11 RX ADMIN — FAMOTIDINE 20 MG: 10 INJECTION INTRAVENOUS at 08:56

## 2024-05-11 RX ADMIN — ALBUTEROL SULFATE 2.5 MG: 2.5 SOLUTION RESPIRATORY (INHALATION) at 20:04

## 2024-05-11 RX ADMIN — BUDESONIDE 1 MG: 1 SUSPENSION RESPIRATORY (INHALATION) at 20:04

## 2024-05-11 RX ADMIN — ALBUTEROL SULFATE 2.5 MG: 2.5 SOLUTION RESPIRATORY (INHALATION) at 12:51

## 2024-05-11 RX ADMIN — SODIUM CHLORIDE SOLN NEBU 3% 4 ML: 3 NEBU SOLN at 20:05

## 2024-05-11 RX ADMIN — ALBUTEROL SULFATE 2.5 MG: 2.5 SOLUTION RESPIRATORY (INHALATION) at 09:03

## 2024-05-11 RX ADMIN — POTASSIUM & SODIUM PHOSPHATES POWDER PACK 280-160-250 MG 250 MG: 280-160-250 PACK at 14:36

## 2024-05-11 RX ADMIN — PIPERACILLIN AND TAZOBACTAM 3375 MG: 3; .375 INJECTION, POWDER, FOR SOLUTION INTRAVENOUS at 11:33

## 2024-05-11 RX ADMIN — ALBUTEROL SULFATE 2.5 MG: 2.5 SOLUTION RESPIRATORY (INHALATION) at 04:26

## 2024-05-11 RX ADMIN — SODIUM CHLORIDE SOLN NEBU 3% 4 ML: 3 NEBU SOLN at 12:51

## 2024-05-11 RX ADMIN — BUDESONIDE 1 MG: 1 SUSPENSION RESPIRATORY (INHALATION) at 09:03

## 2024-05-11 RX ADMIN — HEPARIN SODIUM 5000 UNITS: 5000 INJECTION INTRAVENOUS; SUBCUTANEOUS at 05:49

## 2024-05-11 RX ADMIN — CHLORHEXIDINE GLUCONATE 15 ML: 1.2 RINSE ORAL at 08:55

## 2024-05-11 RX ADMIN — CHLORHEXIDINE GLUCONATE 15 ML: 1.2 RINSE ORAL at 21:09

## 2024-05-11 RX ADMIN — POTASSIUM & SODIUM PHOSPHATES POWDER PACK 280-160-250 MG 250 MG: 280-160-250 PACK at 21:10

## 2024-05-11 RX ADMIN — HEPARIN SODIUM 5000 UNITS: 5000 INJECTION INTRAVENOUS; SUBCUTANEOUS at 14:30

## 2024-05-11 RX ADMIN — ALBUTEROL SULFATE 2.5 MG: 2.5 SOLUTION RESPIRATORY (INHALATION) at 16:48

## 2024-05-11 RX ADMIN — POTASSIUM & SODIUM PHOSPHATES POWDER PACK 280-160-250 MG 250 MG: 280-160-250 PACK at 18:08

## 2024-05-11 ASSESSMENT — PULMONARY FUNCTION TESTS
PIF_VALUE: 14
PIF_VALUE: 12
PIF_VALUE: 16
PIF_VALUE: 13
PIF_VALUE: 15
PIF_VALUE: 13
PIF_VALUE: 18

## 2024-05-11 ASSESSMENT — PAIN SCALES - GENERAL
PAINLEVEL_OUTOF10: 0

## 2024-05-11 NOTE — PROGRESS NOTES
[facility]   Pharmacy Pharmacokinetic Monitoring Service - Vancomycin    Indication: sepsis  Goal AUC/TWILA: 400-600  Day of Therapy: 6  Additional Antimicrobials: pip-tazo    Pertinent Laboratory Values:   Wt Readings from Last 1 Encounters:   05/11/24 74.8 kg (165 lb)     Temp Readings from Last 1 Encounters:   05/11/24 98.3 °F (36.8 °C) (Axillary)     Estimated Creatinine Clearance: 52 mL/min (based on SCr of 1.11 mg/dL).    Recent Labs     05/10/24  0207 05/11/24  0415   CREATININE 1.21 1.11   BUN 33* 24*   WBC 12.3 7.6     Pertinent Cultures:  Date Source Results   5/6 blood CoNS in 1/2 5/7 ET aspirate MRSA, yeast   5/7 ET aspirate MRSA, yeast   5/8 blood NGTD   5/8 BAL Light vikas   5/10 BAL GNR   MRSA Nasal Swab: present    Assessment:  Date Current Dose Level (mg/L) Timing of Level (h) AUC/TWILA   5/6 1,750 mg x1 - - -   5/7 - - - -   5/8 1,500 mg x1 5.4 32 NA   5/9 1,250 mg x1 10.9 17 NA   5/10 1,500 mg x1 15.6 15 NA   5/11 1,250 mg q18h 12.4 16 532     Plan:  Start a dose of 1,250 mg q18h  No level ordered at this time  Pharmacy will continue to monitor patient and adjust therapy as indicated    Thank you for the consult,  Toy Velasquez [facility]  5/11/2024

## 2024-05-11 NOTE — PROGRESS NOTES
PHYSICAL THERAPY EVALUATION/DISCHARGE    Patient: Paul Bobby (83 y.o. male)  Date: 5/11/2024  Primary Diagnosis: Acute respiratory failure (HCC) [J96.00]  Altered mental status, unspecified altered mental status type [R41.82]  Acute hypoxemic respiratory failure (HCC) [J96.01]  AMS (altered mental status) [R41.82]       Precautions:   ,  ,  ,  ,  ,  ,  ,    PLOF: UA TO ASSESS     ASSESSMENT AND RECOMMENDATIONS:  Patient cleared by nursing staff for consult  He is supine in bed and on vent and sedated  He is UA to answer questions  PROM per consult request for B LE while supine in bed.    Plan to sign off on this order as patient is UA to participated in Skilled PT services at this time. PROM can be performed by staff or family if educated on PROM.     Plan to remain available for future order as medical condition and progress allows.     Patient does not require further skilled physical therapy intervention at this level of care.    Further Equipment Recommendations for Discharge:  TO BE FURTHER ASSESSED as patient condition allows    AMPA:   AM-PAC Inpatient Mobility without Stair Climbing Raw Score : 5  To be further assessed as patient condition allows. Remain available for future orders         Current research shows that an AM-PAC score of 17 (13 without stairs) or less is not associated with a discharge to the patient's home setting. Based on an AM-PAC score and their current functional mobility deficits, it is recommended that the patient have 3-5 sessions per week of Physical Therapy at d/c to increase the patient's independence.   ? Long term care        This AMPA score should be considered in conjunction with interdisciplinary team recommendations to determine the most appropriate discharge setting. Patient's social support, diagnosis, medical stability, and prior level of function should also be taken into consideration.     SUBJECTIVE:   Patient is non verbal- on vent and

## 2024-05-11 NOTE — PROGRESS NOTES
[facility].  Pulmonary, Critical Care, and Sleep Medicine    Name: Paul Bobby MRN: [id number]   : 1940 Hospital: [facility]   Date: 2024  Admission Date: 2024     Chart and notes reviewed. Data reviewed. I have evaluated all findings.    [x]I have reviewed the flowsheet and previous day’s notes.    [x]The patient is unable to give any meaningful history or review of systems because the patient is:  [x]Intubated [x]Sedated   []Unresponsive      [x]The patient is critically ill on      [x]Mechanical ventilation [x]Pressors   []BiPAP []         Interval HPI:]  Patient is a 83 y.o. male with a PMHx of dementia, DM II, HLD, and  COPD who presented to [facility] ER with encephalopathy and hypoxia. Per EMS, patient PACE and was evaluated by the nurse practitioner who found him altered and vomiting. She was concerned he aspirated on his lunch and called EMS. On arrival to ER, he was on NRB with oxygen saturation In the 70's and tachypneic. Patient was initially placed on Bipap by RT with little improvement of oxygenation. ABG was done and demonstrated metabolic acidosis, no hypercapnia. D/w ED physician, patient was placed on Bipap without medical update that patient had been vomiting prior to arrival. Update was later provided by the daughter and patient was removed from Bipap.  Decision was made to emergently intubate patient, however, prior to intubation patient suffered from large vomiting episode with inability to clear secretions. Apparently patient was not on Bipap at this time, unclear when patient was taken off. Patient continued with respiratory distress and lethargy, so patient was emergently intubated. Patient became hypotensive, refractory to IVF resuscitation and CVL was placed. Levophed was started. S/p bronch  which demonstrated mucous plugging, purulent secretions, and corn kernel particles.     Subjective 24  Hospital Day: 24   Vent Day:  lower lobes, very extensive along with food-corn, lodged in right lower lobe.  Repeat bronchoscopy shows some remaining purulent secretions much less in amount, no food particles visualized -- repeated x 2, now CXR starting to show some improvement.  Continue on airway clearance with MetaNeb and hypertonic saline.  Given poor functional status and ongoing aspiration, with severe toxic/metabolic encephalopathy, patient has high risk for recurrence and I recommend hospice/comfort measures--palliative care consulted.  Patient also has ESTRELLA, secondary to prerenal azotemia versus ischemic ATN.  Patient also found to have parainfluenza positive, however pneumonia secondary to aspiration pneumonia.  From my mechanical ventilation standpoint, patient doing well on SBT, however poor mental status, awaiting goals of care from palliative care.     Acute prognosis is poor, long-term prognosis is very poor given underlying dementia with severe aspiration pneumonia        Remi Mendez MD/MPH     Pulmonary, Critical Care Medicine  [facility]

## 2024-05-11 NOTE — PROGRESS NOTES
Restraints- Karely Arvizu RN, and UDAY Luis RN have reviewed the chart and verified that the restraint order matches that of the restraint documentation, the order is not , and documentation is complete per the type of restraints implemented based on policy and free from omission.

## 2024-05-11 NOTE — PROGRESS NOTES
No SBT at this time-     05/11/24 0909   Weaning Parameters   Spontaneous Breathing Trial Complete (S)  No (comment)  (Will SBT after Metaneb tx)

## 2024-05-11 NOTE — PROGRESS NOTES
SBT initiated, PS 7, PEEP 6, FiO2 35%. Pt awake and alert, able to follow some commands.     05/11/24 1003   Patient Observation   Pulse 90   Respirations 27   SpO2 97 %   Vent Information   Vent Mode (S)  CPAP/PS   Ventilator Settings   FiO2  35 %   PEEP/CPAP (cmH2O) 6   Peak Inspiratory Flow (Set) 55 L/sec   Pressure Support (cm H2O) 7 cm H2O   Vent Patient Data (Readings)   Vt Spont (mL) 619 mL   Peak Inspiratory Pressure (cmH2O) 14 cmH2O   Rate Measured 27 br/min   Minute Volume (L/min) 17 Liters   Mean Airway Pressure (cmH2O) 9.3 cmH20   I:E Ratio 1:1.3   Pressure Sensitivity 3 cm H2O   Disconnect Sensitivity (%) 75 %   Expiratory Sensitivity (%) 25 %   Insp Rise Time (%) 50 %   Backup Apnea On   Backup Rate 22 Breaths Per Minute   Backup Vt 500   Backup I Time 1   Weaning Parameters   Spontaneous Breathing Trial Complete (S)  Yes   Respiratory Rate Observed 27   Ve 17      RSBI 42       Tolerating. Will monitor for signs of apnea or distress.

## 2024-05-12 ENCOUNTER — APPOINTMENT (OUTPATIENT)
Facility: HOSPITAL | Age: 84
End: 2024-05-12
Payer: MEDICARE

## 2024-05-12 LAB
ANION GAP SERPL CALC-SCNC: 4 MMOL/L (ref 3–18)
ARTERIAL PATENCY WRIST A: POSITIVE
BACTERIA SPEC CULT: ABNORMAL
BACTERIA SPEC CULT: NORMAL
BASE DEFICIT BLD-SCNC: 2.1 MMOL/L
BASOPHILS # BLD: 0 K/UL (ref 0–0.1)
BASOPHILS NFR BLD: 0 % (ref 0–2)
BDY SITE: ABNORMAL
BUN SERPL-MCNC: 19 MG/DL (ref 7–18)
BUN/CREAT SERPL: 19 (ref 12–20)
CALCIUM SERPL-MCNC: 8.4 MG/DL (ref 8.5–10.1)
CHLORIDE SERPL-SCNC: 116 MMOL/L (ref 100–111)
CO2 SERPL-SCNC: 25 MMOL/L (ref 21–32)
CREAT SERPL-MCNC: 1.01 MG/DL (ref 0.6–1.3)
DIFFERENTIAL METHOD BLD: ABNORMAL
EOSINOPHIL # BLD: 0.1 K/UL (ref 0–0.4)
EOSINOPHIL NFR BLD: 2 % (ref 0–5)
ERYTHROCYTE [DISTWIDTH] IN BLOOD BY AUTOMATED COUNT: 14.5 % (ref 11.6–14.5)
GAS FLOW.O2 O2 DELIVERY SYS: ABNORMAL
GAS FLOW.O2 SETTING OXYMISER: 22 BPM
GLUCOSE BLD STRIP.AUTO-MCNC: 100 MG/DL (ref 70–110)
GLUCOSE BLD STRIP.AUTO-MCNC: 96 MG/DL (ref 70–110)
GLUCOSE SERPL-MCNC: 137 MG/DL (ref 74–99)
GRAM STN SPEC: ABNORMAL
HCO3 BLD-SCNC: 21.4 MMOL/L (ref 22–26)
HCT VFR BLD AUTO: 33.3 % (ref 36–48)
HGB BLD-MCNC: 10.7 G/DL (ref 13–16)
IMM GRANULOCYTES # BLD AUTO: 0.1 K/UL (ref 0–0.04)
IMM GRANULOCYTES NFR BLD AUTO: 1 % (ref 0–0.5)
LYMPHOCYTES # BLD: 1.9 K/UL (ref 0.9–3.6)
LYMPHOCYTES NFR BLD: 25 % (ref 21–52)
MAGNESIUM SERPL-MCNC: 2.2 MG/DL (ref 1.6–2.6)
MCH RBC QN AUTO: 30.7 PG (ref 24–34)
MCHC RBC AUTO-ENTMCNC: 32.1 G/DL (ref 31–37)
MCV RBC AUTO: 95.4 FL (ref 78–100)
MONOCYTES # BLD: 1.2 K/UL (ref 0.05–1.2)
MONOCYTES NFR BLD: 16 % (ref 3–10)
NEUTS SEG # BLD: 4.3 K/UL (ref 1.8–8)
NEUTS SEG NFR BLD: 57 % (ref 40–73)
NRBC # BLD: 0 K/UL (ref 0–0.01)
NRBC BLD-RTO: 0 PER 100 WBC
O2/TOTAL GAS SETTING VFR VENT: 35 %
PCO2 BLD: 32 MMHG (ref 35–45)
PEEP RESPIRATORY: 5 CMH2O
PH BLD: 7.43 (ref 7.35–7.45)
PHOSPHATE SERPL-MCNC: 3.3 MG/DL (ref 2.5–4.9)
PLATELET # BLD AUTO: 192 K/UL (ref 135–420)
PMV BLD AUTO: 10.1 FL (ref 9.2–11.8)
PO2 BLD: 71 MMHG (ref 80–100)
POTASSIUM SERPL-SCNC: 3.6 MMOL/L (ref 3.5–5.5)
RBC # BLD AUTO: 3.49 M/UL (ref 4.35–5.65)
RESPIRATORY RATE, POC: 25 (ref 5–40)
SAO2 % BLD: 94.8 % (ref 92–97)
SERVICE CMNT-IMP: ABNORMAL
SERVICE CMNT-IMP: ABNORMAL
SERVICE CMNT-IMP: NORMAL
SODIUM SERPL-SCNC: 145 MMOL/L (ref 136–145)
SPECIMEN TYPE: ABNORMAL
VENTILATION MODE VENT: ABNORMAL
VT SETTING VENT: 500 ML
WBC # BLD AUTO: 7.5 K/UL (ref 4.6–13.2)

## 2024-05-12 PROCEDURE — 6360000002 HC RX W HCPCS: Performed by: INTERNAL MEDICINE

## 2024-05-12 PROCEDURE — 2580000003 HC RX 258: Performed by: INTERNAL MEDICINE

## 2024-05-12 PROCEDURE — 84100 ASSAY OF PHOSPHORUS: CPT

## 2024-05-12 PROCEDURE — 80048 BASIC METABOLIC PNL TOTAL CA: CPT

## 2024-05-12 PROCEDURE — 82803 BLOOD GASES ANY COMBINATION: CPT

## 2024-05-12 PROCEDURE — 2580000003 HC RX 258: Performed by: EMERGENCY MEDICINE

## 2024-05-12 PROCEDURE — 6370000000 HC RX 637 (ALT 250 FOR IP): Performed by: EMERGENCY MEDICINE

## 2024-05-12 PROCEDURE — 94668 MNPJ CHEST WALL SBSQ: CPT

## 2024-05-12 PROCEDURE — 83735 ASSAY OF MAGNESIUM: CPT

## 2024-05-12 PROCEDURE — 99291 CRITICAL CARE FIRST HOUR: CPT | Performed by: INTERNAL MEDICINE

## 2024-05-12 PROCEDURE — 94640 AIRWAY INHALATION TREATMENT: CPT

## 2024-05-12 PROCEDURE — 2500000003 HC RX 250 WO HCPCS: Performed by: EMERGENCY MEDICINE

## 2024-05-12 PROCEDURE — APPSS30 APP SPLIT SHARED TIME 16-30 MINUTES

## 2024-05-12 PROCEDURE — 2700000000 HC OXYGEN THERAPY PER DAY

## 2024-05-12 PROCEDURE — 36600 WITHDRAWAL OF ARTERIAL BLOOD: CPT

## 2024-05-12 PROCEDURE — 85025 COMPLETE CBC W/AUTO DIFF WBC: CPT

## 2024-05-12 PROCEDURE — 6360000002 HC RX W HCPCS: Performed by: NURSE PRACTITIONER

## 2024-05-12 PROCEDURE — 6360000002 HC RX W HCPCS: Performed by: REGISTERED NURSE

## 2024-05-12 PROCEDURE — 94669 MECHANICAL CHEST WALL OSCILL: CPT

## 2024-05-12 PROCEDURE — 2580000003 HC RX 258: Performed by: REGISTERED NURSE

## 2024-05-12 PROCEDURE — 2000000000 HC ICU R&B

## 2024-05-12 PROCEDURE — 71045 X-RAY EXAM CHEST 1 VIEW: CPT

## 2024-05-12 PROCEDURE — 94761 N-INVAS EAR/PLS OXIMETRY MLT: CPT

## 2024-05-12 PROCEDURE — A4216 STERILE WATER/SALINE, 10 ML: HCPCS | Performed by: EMERGENCY MEDICINE

## 2024-05-12 PROCEDURE — 94003 VENT MGMT INPAT SUBQ DAY: CPT

## 2024-05-12 PROCEDURE — 94667 MNPJ CHEST WALL 1ST: CPT

## 2024-05-12 PROCEDURE — 6370000000 HC RX 637 (ALT 250 FOR IP): Performed by: PHYSICIAN ASSISTANT

## 2024-05-12 PROCEDURE — 82962 GLUCOSE BLOOD TEST: CPT

## 2024-05-12 RX ORDER — POLYETHYLENE GLYCOL 3350 17 G/17G
17 POWDER, FOR SOLUTION ORAL DAILY PRN
Status: DISCONTINUED | OUTPATIENT
Start: 2024-05-12 | End: 2024-05-23

## 2024-05-12 RX ADMIN — BUDESONIDE 1 MG: 1 SUSPENSION RESPIRATORY (INHALATION) at 20:06

## 2024-05-12 RX ADMIN — CHLORHEXIDINE GLUCONATE 15 ML: 1.2 RINSE ORAL at 09:37

## 2024-05-12 RX ADMIN — PIPERACILLIN AND TAZOBACTAM 3375 MG: 3; .375 INJECTION, POWDER, FOR SOLUTION INTRAVENOUS at 12:30

## 2024-05-12 RX ADMIN — SODIUM CHLORIDE SOLN NEBU 3% 4 ML: 3 NEBU SOLN at 12:00

## 2024-05-12 RX ADMIN — HEPARIN SODIUM 5000 UNITS: 5000 INJECTION INTRAVENOUS; SUBCUTANEOUS at 05:53

## 2024-05-12 RX ADMIN — ALBUTEROL SULFATE 2.5 MG: 2.5 SOLUTION RESPIRATORY (INHALATION) at 07:30

## 2024-05-12 RX ADMIN — PIPERACILLIN AND TAZOBACTAM 3375 MG: 3; .375 INJECTION, POWDER, FOR SOLUTION INTRAVENOUS at 20:17

## 2024-05-12 RX ADMIN — SODIUM CHLORIDE SOLN NEBU 3% 4 ML: 3 NEBU SOLN at 15:00

## 2024-05-12 RX ADMIN — SODIUM CHLORIDE SOLN NEBU 3% 4 ML: 3 NEBU SOLN at 20:05

## 2024-05-12 RX ADMIN — ALBUTEROL SULFATE 2.5 MG: 2.5 SOLUTION RESPIRATORY (INHALATION) at 20:05

## 2024-05-12 RX ADMIN — SODIUM CHLORIDE SOLN NEBU 3% 4 ML: 3 NEBU SOLN at 07:30

## 2024-05-12 RX ADMIN — PIPERACILLIN AND TAZOBACTAM 3375 MG: 3; .375 INJECTION, POWDER, FOR SOLUTION INTRAVENOUS at 05:17

## 2024-05-12 RX ADMIN — THIAMINE HYDROCHLORIDE 100 MG: 100 INJECTION, SOLUTION INTRAMUSCULAR; INTRAVENOUS at 09:37

## 2024-05-12 RX ADMIN — VANCOMYCIN 1250 MG: 1.75 INJECTION, SOLUTION INTRAVENOUS at 06:42

## 2024-05-12 RX ADMIN — SODIUM CHLORIDE SOLN NEBU 3% 4 ML: 3 NEBU SOLN at 00:34

## 2024-05-12 RX ADMIN — ALBUTEROL SULFATE 2.5 MG: 2.5 SOLUTION RESPIRATORY (INHALATION) at 12:00

## 2024-05-12 RX ADMIN — ALBUTEROL SULFATE 2.5 MG: 2.5 SOLUTION RESPIRATORY (INHALATION) at 00:34

## 2024-05-12 RX ADMIN — HEPARIN SODIUM 5000 UNITS: 5000 INJECTION INTRAVENOUS; SUBCUTANEOUS at 22:30

## 2024-05-12 RX ADMIN — FAMOTIDINE 20 MG: 10 INJECTION INTRAVENOUS at 09:37

## 2024-05-12 RX ADMIN — HEPARIN SODIUM 5000 UNITS: 5000 INJECTION INTRAVENOUS; SUBCUTANEOUS at 14:30

## 2024-05-12 RX ADMIN — BUDESONIDE 1 MG: 1 SUSPENSION RESPIRATORY (INHALATION) at 07:30

## 2024-05-12 RX ADMIN — POTASSIUM BICARBONATE 40 MEQ: 782 TABLET, EFFERVESCENT ORAL at 06:42

## 2024-05-12 RX ADMIN — ALBUTEROL SULFATE 2.5 MG: 2.5 SOLUTION RESPIRATORY (INHALATION) at 15:00

## 2024-05-12 ASSESSMENT — PULMONARY FUNCTION TESTS
PIF_VALUE: 12
PIF_VALUE: 12
PIF_VALUE: 17
PIF_VALUE: 18
PIF_VALUE: 12

## 2024-05-12 ASSESSMENT — PAIN SCALES - GENERAL
PAINLEVEL_OUTOF10: 0

## 2024-05-12 NOTE — PROGRESS NOTES
[facility].  Pulmonary, Critical Care, and Sleep Medicine    Name: Paul Bobby MRN: [id number]   : 1940 Hospital: [facility]   Date: 2024  Admission Date: 2024     Chart and notes reviewed. Data reviewed. I have evaluated all findings.    [x]I have reviewed the flowsheet and previous day’s notes.    [x]The patient is unable to give any meaningful history or review of systems because the patient is:  [x]Intubated [x]Sedated   []Unresponsive      [x]The patient is critically ill on      [x]Mechanical ventilation [x]Pressors   []BiPAP []         Interval HPI:]  Patient is a 83 y.o. male with a PMHx of dementia, DM II, HLD, and  COPD who presented to [facility] ER with encephalopathy and hypoxia. Per EMS, patient PACE and was evaluated by the nurse practitioner who found him altered and vomiting. She was concerned he aspirated on his lunch and called EMS. On arrival to ER, he was on NRB with oxygen saturation In the 70's and tachypneic. Patient was initially placed on Bipap by RT with little improvement of oxygenation. ABG was done and demonstrated metabolic acidosis, no hypercapnia. D/w ED physician, patient was placed on Bipap without medical update that patient had been vomiting prior to arrival. Update was later provided by the daughter and patient was removed from Bipap.  Decision was made to emergently intubate patient, however, prior to intubation patient suffered from large vomiting episode with inability to clear secretions. Apparently patient was not on Bipap at this time, unclear when patient was taken off. Patient continued with respiratory distress and lethargy, so patient was emergently intubated. Patient became hypotensive, refractory to IVF resuscitation and CVL was placed. Levophed was started. S/p bronch  which demonstrated mucous plugging, purulent secretions, and corn kernel particles.       Patient has been tolerating SBTs,however extubation has

## 2024-05-12 NOTE — PROGRESS NOTES
0752- SBT per protocol  CPAP/PSV  PS 7  +6  35%     05/12/24 0752   Weaning Parameters   Spontaneous Breathing Trial Complete Yes   Respiratory Rate Observed 24   Ve 13.5      RSBI 40     Will contiue to monitor respiratory status/ need for increased support/ apnea

## 2024-05-12 NOTE — PROGRESS NOTES
05/12/24 0036   Patient Observation   Pulse 79   Respirations 22   SpO2 97 %   Breath Sounds   Breath Sounds Bilateral Diminished   Vent Information   Ventilator Day(s) 7   $Ventilation $Subsequent Day   Ventilator Settings   FiO2  35 %   Insp Time (sec) 1 sec   Resp Rate (Set) 22 bpm   PEEP/CPAP (cmH2O) 6   Target Vt 500   Vent Patient Data (Readings)   Vt Mandatory Exp (mL) 500 mL   Vt (Measured) 601 mL   Peak Inspiratory Pressure (cmH2O) 18 cmH2O   Rate Measured 22 br/min   Minute Volume (L/min) 11.8 Liters   Mean Airway Pressure (cmH2O) 11 cmH20   Inspiratory Time 1 sec   I:E Ratio 1:1.7   Pressure Sensitivity 3 cm H2O   Disconnect Sensitivity (%) 75 %   Insp Rise Time (%) 50 %   Backup Apnea On   Backup Rate 22 Breaths Per Minute   Backup Vt 500   Backup I Time 1   Treatment   $Treatment Type $Inhaled Therapy/Meds     Patient was placed back on a rate at 2000 to rest overnight. 10 hour SBT on 5/12.

## 2024-05-12 NOTE — PROGRESS NOTES
[facility]   Pharmacy Pharmacokinetic Monitoring Service - Vancomycin    Indication: sepsis  Goal AUC/TWILA: 400-600  Day of Therapy: 7  Additional Antimicrobials: pip-tazo    Pertinent Laboratory Values:   Wt Readings from Last 1 Encounters:   05/11/24 74.8 kg (165 lb)     Temp Readings from Last 1 Encounters:   05/12/24 99.2 °F (37.3 °C) (Axillary)     Estimated Creatinine Clearance: 57 mL/min (based on SCr of 1.01 mg/dL).    Recent Labs     05/11/24  0415 05/12/24  0505   CREATININE 1.11 1.01   BUN 24* 19*   WBC 7.6 7.5     Pertinent Cultures:  Date Source Results   5/6 blood CoNS in 1/2 5/7 ET aspirate MRSA, yeast   5/7 ET aspirate MRSA, yeast   5/8 blood NGTD   5/8 BAL Light vikas   5/10 BAL MRSA, Kleb   MRSA Nasal Swab: present    Assessment:  Date Current Dose Level (mg/L) Timing of Level (h) AUC/TWILA   5/6 1,750 mg x1 - - -   5/7 - - - -   5/8 1,500 mg x1 5.4 32 NA   5/9 1,250 mg x1 10.9 17 NA   5/10 1,500 mg x1 15.6 15 NA   5/11 1,250 mg q18h 12.4 16 532   5/12 1,250 mg q18h - - -     Plan:  Continue current dose of 1,250 mg q18h  No level ordered at this time  Pharmacy will continue to monitor patient and adjust therapy as indicated    Thank you for the consult,  Toy Velasquez RPH  5/12/2024

## 2024-05-12 NOTE — PROGRESS NOTES
Extubated after successful SBT-  HF Vapotherm 40L/ 40%  Patient demonstrating strong productive cough.  No respiratory distress noted at this time.  HR 82 RR18 SpO2 100%.  Will continue to monitor respiratory status   05/12/24 1500   Patient Observation   Pulse 82   Respirations 18   SpO2 100 %   Breath Sounds   Breath Sounds Bilateral Clear;Coarse crackles   Vent Information   Ventilator Discontinue Yes   Ventilator Settings   FiO2  40 %   Additional Respiratoray Assessments   Humidification Temp 37   Airway Clearance   Suction ET Tube;Oral   Subglottic Suction Done Yes   Suction Device Ramankauer;Inline suction catheter   Suction Catheter Size 14 Fr   Sputum Method Obtained Endotracheal   Sputum Amount Moderate   Sputum Color/Odor Tan   Sputum Consistency Thick   Skin Assessment   Skin Assessment Clean, dry, & intact

## 2024-05-12 NOTE — PROGRESS NOTES
VENTILATOR CARE PLAN    Problem: Ventilator Management  Goal: *Adequate oxygenation/ ventilation/ and extubation      Patient:        Paul Bobby     83 y.o.   male     2024  5:36 AM  Patient Active Problem List   Diagnosis    Weakness    Syncope    Dementia (HCC)    AMS (altered mental status)    Acute respiratory failure (HCC)    Aspiration pneumonia of both lungs due to vomit (HCC)    Aspiration of food    Mucus plugging of bronchi    Acute encephalopathy    Acute hypoxemic respiratory failure (HCC)    ESTRELLA (acute kidney injury) (HCC)    Septic shock (HCC)    Lactic acidosis    Multifocal pneumonia    Metabolic acidosis       Acute respiratory failure (HCC) [J96.00]  Altered mental status, unspecified altered mental status type [R41.82]  Acute hypoxemic respiratory failure (HCC) [J96.01]  AMS (altered mental status) [R41.82]    Reason patient intubated: AMS    Ventilator day: 7    Ventilator settings:  500  22  6  35    ETT Size/Placement: 8.0  24 @TL     Wean Screen Pass (Yes or No):  Wean Screen Reason for Failure:  Duration of Weaning Trial: 10 HRS  Additional Comments:        PLAN OF CARE: Maintain Ventilator  and SBT in AM       GOAL: Wean O2      OUTCOME: no weaning done    AB.43  32  71  21  -2  95%  Date:2024  @COYCQAVN05(ph,phi,pco2,pco2i,po2,po2i,hco3,hco3i,fio2,fio2i)@    Chest X-ray:  Date:2024  @BSHSILASTIMGCAT(IRB8728:1)@    Lab Test:  Date:2024  WBC:   Lab Results   Component Value Date/Time    WBC 7.6 2024 04:15 AM   HGB:   Lab Results   Component Value Date/Time    HGB 10.3 2024 04:15 AM    PLTS:   Lab Results   Component Value Date/Time     2024 04:15 AM         Vital Signs:   Patient Vitals for the past 8 hrs:   Temp Pulse Resp BP SpO2   24 0500 -- 85 25 (!) 132/58 98 %   24 0445 -- 89 22 -- 98 %   24 0400 99.2 °F (37.3 °C) 76 25 134/66 100 %   24 0300 -- 75 22 (!) 128/59 98 %   24 0200 -- 84 22 (!) 121/59 96 %

## 2024-05-12 NOTE — PLAN OF CARE
Problem: Safety - Medical Restraint  Goal: Remains free of injury from restraints (Restraint for Interference with Medical Device)  Description: INTERVENTIONS:  1. Determine that other, less restrictive measures have been tried or would not be effective before applying the restraint  2. Evaluate the patient's condition at the time of restraint application  3. Inform patient/family regarding the reason for restraint  4. Q2H: Monitor safety, psychosocial status, comfort, nutrition and hydration  Outcome: Progressing  Flowsheets  Taken 5/11/2024 2000 by Alina Luis RN  Remains free of injury from restraints (restraint for interference with medical device):   Determine that other, less restrictive measures have been tried or would not be effective before applying the restraint   Evaluate the patient's condition at the time of restraint application   Inform patient/family regarding the reason for restraint   Every 2 hours: Monitor safety, psychosocial status, comfort, nutrition and hydration  Taken 5/11/2024 1800 by Prem Monsalve RN  Remains free of injury from restraints (restraint for interference with medical device):   Determine that other, less restrictive measures have been tried or would not be effective before applying the restraint   Evaluate the patient's condition at the time of restraint application   Inform patient/family regarding the reason for restraint   Every 2 hours: Monitor safety, psychosocial status, comfort, nutrition and hydration  Taken 5/11/2024 1600 by Prem Monsalve RN  Remains free of injury from restraints (restraint for interference with medical device):   Determine that other, less restrictive measures have been tried or would not be effective before applying the restraint   Evaluate the patient's condition at the time of restraint application   Inform patient/family regarding the reason for restraint   Every 2 hours: Monitor safety, psychosocial status, comfort,

## 2024-05-13 ENCOUNTER — APPOINTMENT (OUTPATIENT)
Facility: HOSPITAL | Age: 84
End: 2024-05-13
Payer: MEDICARE

## 2024-05-13 PROBLEM — T17.908A ASPIRATION INTO AIRWAY: Status: ACTIVE | Noted: 2024-05-13

## 2024-05-13 PROBLEM — Z71.89 GOALS OF CARE, COUNSELING/DISCUSSION: Status: ACTIVE | Noted: 2024-05-13

## 2024-05-13 PROBLEM — Z71.89 DNR (DO NOT RESUSCITATE) DISCUSSION: Status: ACTIVE | Noted: 2024-05-13

## 2024-05-13 PROBLEM — Z51.5 PALLIATIVE CARE ENCOUNTER: Status: ACTIVE | Noted: 2024-05-13

## 2024-05-13 LAB
ANION GAP SERPL CALC-SCNC: 3 MMOL/L (ref 3–18)
BASOPHILS # BLD: 0 K/UL (ref 0–0.1)
BASOPHILS NFR BLD: 1 % (ref 0–2)
BUN SERPL-MCNC: 16 MG/DL (ref 7–18)
BUN/CREAT SERPL: 18 (ref 12–20)
CALCIUM SERPL-MCNC: 8.5 MG/DL (ref 8.5–10.1)
CHLORIDE SERPL-SCNC: 115 MMOL/L (ref 100–111)
CO2 SERPL-SCNC: 26 MMOL/L (ref 21–32)
CREAT SERPL-MCNC: 0.9 MG/DL (ref 0.6–1.3)
DIFFERENTIAL METHOD BLD: ABNORMAL
EOSINOPHIL # BLD: 0.2 K/UL (ref 0–0.4)
EOSINOPHIL NFR BLD: 2 % (ref 0–5)
ERYTHROCYTE [DISTWIDTH] IN BLOOD BY AUTOMATED COUNT: 14.5 % (ref 11.6–14.5)
GLUCOSE BLD STRIP.AUTO-MCNC: 107 MG/DL (ref 70–110)
GLUCOSE BLD STRIP.AUTO-MCNC: 114 MG/DL (ref 70–110)
GLUCOSE BLD STRIP.AUTO-MCNC: 116 MG/DL (ref 70–110)
GLUCOSE SERPL-MCNC: 112 MG/DL (ref 74–99)
HCT VFR BLD AUTO: 32.5 % (ref 36–48)
HGB BLD-MCNC: 10.2 G/DL (ref 13–16)
IMM GRANULOCYTES # BLD AUTO: 0.1 K/UL (ref 0–0.04)
IMM GRANULOCYTES NFR BLD AUTO: 1 % (ref 0–0.5)
LYMPHOCYTES # BLD: 1.8 K/UL (ref 0.9–3.6)
LYMPHOCYTES NFR BLD: 23 % (ref 21–52)
MAGNESIUM SERPL-MCNC: 1.9 MG/DL (ref 1.6–2.6)
MCH RBC QN AUTO: 30.4 PG (ref 24–34)
MCHC RBC AUTO-ENTMCNC: 31.4 G/DL (ref 31–37)
MCV RBC AUTO: 97 FL (ref 78–100)
MONOCYTES # BLD: 1 K/UL (ref 0.05–1.2)
MONOCYTES NFR BLD: 12 % (ref 3–10)
NEUTS SEG # BLD: 4.8 K/UL (ref 1.8–8)
NEUTS SEG NFR BLD: 61 % (ref 40–73)
NRBC # BLD: 0 K/UL (ref 0–0.01)
NRBC BLD-RTO: 0 PER 100 WBC
PHOSPHATE SERPL-MCNC: 3.1 MG/DL (ref 2.5–4.9)
PLATELET # BLD AUTO: 203 K/UL (ref 135–420)
PMV BLD AUTO: 10.8 FL (ref 9.2–11.8)
POTASSIUM SERPL-SCNC: 3.7 MMOL/L (ref 3.5–5.5)
RBC # BLD AUTO: 3.35 M/UL (ref 4.35–5.65)
SODIUM SERPL-SCNC: 144 MMOL/L (ref 136–145)
WBC # BLD AUTO: 7.9 K/UL (ref 4.6–13.2)

## 2024-05-13 PROCEDURE — A4216 STERILE WATER/SALINE, 10 ML: HCPCS | Performed by: EMERGENCY MEDICINE

## 2024-05-13 PROCEDURE — 82962 GLUCOSE BLOOD TEST: CPT

## 2024-05-13 PROCEDURE — 6360000002 HC RX W HCPCS: Performed by: INTERNAL MEDICINE

## 2024-05-13 PROCEDURE — 84100 ASSAY OF PHOSPHORUS: CPT

## 2024-05-13 PROCEDURE — 2700000000 HC OXYGEN THERAPY PER DAY

## 2024-05-13 PROCEDURE — 6360000002 HC RX W HCPCS: Performed by: NURSE PRACTITIONER

## 2024-05-13 PROCEDURE — 2580000003 HC RX 258: Performed by: INTERNAL MEDICINE

## 2024-05-13 PROCEDURE — 2580000003 HC RX 258: Performed by: REGISTERED NURSE

## 2024-05-13 PROCEDURE — 6360000002 HC RX W HCPCS: Performed by: REGISTERED NURSE

## 2024-05-13 PROCEDURE — 2000000000 HC ICU R&B

## 2024-05-13 PROCEDURE — 94640 AIRWAY INHALATION TREATMENT: CPT

## 2024-05-13 PROCEDURE — 85025 COMPLETE CBC W/AUTO DIFF WBC: CPT

## 2024-05-13 PROCEDURE — 74018 RADEX ABDOMEN 1 VIEW: CPT

## 2024-05-13 PROCEDURE — 97164 PT RE-EVAL EST PLAN CARE: CPT

## 2024-05-13 PROCEDURE — 80048 BASIC METABOLIC PNL TOTAL CA: CPT

## 2024-05-13 PROCEDURE — 92610 EVALUATE SWALLOWING FUNCTION: CPT

## 2024-05-13 PROCEDURE — 2500000003 HC RX 250 WO HCPCS: Performed by: EMERGENCY MEDICINE

## 2024-05-13 PROCEDURE — 36415 COLL VENOUS BLD VENIPUNCTURE: CPT

## 2024-05-13 PROCEDURE — 6370000000 HC RX 637 (ALT 250 FOR IP)

## 2024-05-13 PROCEDURE — 6360000002 HC RX W HCPCS: Performed by: PHYSICIAN ASSISTANT

## 2024-05-13 PROCEDURE — 6370000000 HC RX 637 (ALT 250 FOR IP): Performed by: EMERGENCY MEDICINE

## 2024-05-13 PROCEDURE — 6370000000 HC RX 637 (ALT 250 FOR IP): Performed by: NURSE PRACTITIONER

## 2024-05-13 PROCEDURE — 97110 THERAPEUTIC EXERCISES: CPT

## 2024-05-13 PROCEDURE — 99291 CRITICAL CARE FIRST HOUR: CPT | Performed by: INTERNAL MEDICINE

## 2024-05-13 PROCEDURE — 83735 ASSAY OF MAGNESIUM: CPT

## 2024-05-13 PROCEDURE — 99232 SBSQ HOSP IP/OBS MODERATE 35: CPT | Performed by: INTERNAL MEDICINE

## 2024-05-13 PROCEDURE — 2580000003 HC RX 258: Performed by: EMERGENCY MEDICINE

## 2024-05-13 PROCEDURE — 94668 MNPJ CHEST WALL SBSQ: CPT

## 2024-05-13 PROCEDURE — APPSS30 APP SPLIT SHARED TIME 16-30 MINUTES

## 2024-05-13 RX ORDER — OXYMETAZOLINE HYDROCHLORIDE 0.05 G/100ML
2 SPRAY NASAL ONCE
Status: COMPLETED | OUTPATIENT
Start: 2024-05-13 | End: 2024-05-13

## 2024-05-13 RX ORDER — ALBUTEROL SULFATE 2.5 MG/3ML
2.5 SOLUTION RESPIRATORY (INHALATION)
Status: COMPLETED | OUTPATIENT
Start: 2024-05-13 | End: 2024-05-16

## 2024-05-13 RX ORDER — LIDOCAINE HYDROCHLORIDE 20 MG/ML
JELLY TOPICAL ONCE
Status: COMPLETED | OUTPATIENT
Start: 2024-05-13 | End: 2024-05-13

## 2024-05-13 RX ORDER — MAGNESIUM SULFATE IN WATER 40 MG/ML
2000 INJECTION, SOLUTION INTRAVENOUS ONCE
Status: COMPLETED | OUTPATIENT
Start: 2024-05-13 | End: 2024-05-13

## 2024-05-13 RX ORDER — SODIUM CHLORIDE FOR INHALATION 3 %
4 VIAL, NEBULIZER (ML) INHALATION
Status: COMPLETED | OUTPATIENT
Start: 2024-05-13 | End: 2024-05-16

## 2024-05-13 RX ADMIN — VANCOMYCIN 1250 MG: 1.75 INJECTION, SOLUTION INTRAVENOUS at 00:20

## 2024-05-13 RX ADMIN — SODIUM CHLORIDE SOLN NEBU 3% 4 ML: 3 NEBU SOLN at 03:13

## 2024-05-13 RX ADMIN — BUDESONIDE 1 MG: 1 SUSPENSION RESPIRATORY (INHALATION) at 08:17

## 2024-05-13 RX ADMIN — HEPARIN SODIUM 5000 UNITS: 5000 INJECTION INTRAVENOUS; SUBCUTANEOUS at 22:04

## 2024-05-13 RX ADMIN — DOCUSATE SODIUM LIQUID 100 MG: 100 LIQUID ORAL at 20:57

## 2024-05-13 RX ADMIN — MAGNESIUM SULFATE HEPTAHYDRATE 2000 MG: 40 INJECTION, SOLUTION INTRAVENOUS at 02:59

## 2024-05-13 RX ADMIN — VANCOMYCIN 1250 MG: 1.75 INJECTION, SOLUTION INTRAVENOUS at 18:20

## 2024-05-13 RX ADMIN — CHLORHEXIDINE GLUCONATE 15 ML: 1.2 RINSE ORAL at 09:22

## 2024-05-13 RX ADMIN — PIPERACILLIN AND TAZOBACTAM 3375 MG: 3; .375 INJECTION, POWDER, FOR SOLUTION INTRAVENOUS at 12:20

## 2024-05-13 RX ADMIN — LIDOCAINE HYDROCHLORIDE: 20 JELLY TOPICAL at 17:26

## 2024-05-13 RX ADMIN — SODIUM CHLORIDE SOLN NEBU 3% 4 ML: 3 NEBU SOLN at 12:26

## 2024-05-13 RX ADMIN — THIAMINE HYDROCHLORIDE 100 MG: 100 INJECTION, SOLUTION INTRAMUSCULAR; INTRAVENOUS at 09:22

## 2024-05-13 RX ADMIN — CHLORHEXIDINE GLUCONATE 15 ML: 1.2 RINSE ORAL at 20:57

## 2024-05-13 RX ADMIN — HEPARIN SODIUM 5000 UNITS: 5000 INJECTION INTRAVENOUS; SUBCUTANEOUS at 17:25

## 2024-05-13 RX ADMIN — ALBUTEROL SULFATE 2.5 MG: 2.5 SOLUTION RESPIRATORY (INHALATION) at 17:19

## 2024-05-13 RX ADMIN — ALBUTEROL SULFATE 2.5 MG: 2.5 SOLUTION RESPIRATORY (INHALATION) at 08:17

## 2024-05-13 RX ADMIN — ALBUTEROL SULFATE 2.5 MG: 2.5 SOLUTION RESPIRATORY (INHALATION) at 12:26

## 2024-05-13 RX ADMIN — SODIUM CHLORIDE SOLN NEBU 3% 4 ML: 3 NEBU SOLN at 08:17

## 2024-05-13 RX ADMIN — ALBUTEROL SULFATE 2.5 MG: 2.5 SOLUTION RESPIRATORY (INHALATION) at 03:13

## 2024-05-13 RX ADMIN — BUDESONIDE 1 MG: 1 SUSPENSION RESPIRATORY (INHALATION) at 20:30

## 2024-05-13 RX ADMIN — SODIUM CHLORIDE SOLN NEBU 3% 4 ML: 3 NEBU SOLN at 00:35

## 2024-05-13 RX ADMIN — PIPERACILLIN AND TAZOBACTAM 3375 MG: 3; .375 INJECTION, POWDER, FOR SOLUTION INTRAVENOUS at 03:55

## 2024-05-13 RX ADMIN — SODIUM CHLORIDE SOLN NEBU 3% 4 ML: 3 NEBU SOLN at 20:30

## 2024-05-13 RX ADMIN — ALBUTEROL SULFATE 2.5 MG: 2.5 SOLUTION RESPIRATORY (INHALATION) at 00:35

## 2024-05-13 RX ADMIN — FAMOTIDINE 20 MG: 10 INJECTION INTRAVENOUS at 09:22

## 2024-05-13 RX ADMIN — ALBUTEROL SULFATE 2.5 MG: 2.5 SOLUTION RESPIRATORY (INHALATION) at 20:30

## 2024-05-13 RX ADMIN — DOCUSATE SODIUM LIQUID 100 MG: 100 LIQUID ORAL at 09:22

## 2024-05-13 RX ADMIN — OXYMETAZOLINE HCL 2 SPRAY: 0.05 SPRAY NASAL at 17:27

## 2024-05-13 RX ADMIN — SODIUM CHLORIDE SOLN NEBU 3% 4 ML: 3 NEBU SOLN at 17:18

## 2024-05-13 RX ADMIN — PIPERACILLIN AND TAZOBACTAM 3375 MG: 3; .375 INJECTION, POWDER, FOR SOLUTION INTRAVENOUS at 20:18

## 2024-05-13 RX ADMIN — HEPARIN SODIUM 5000 UNITS: 5000 INJECTION INTRAVENOUS; SUBCUTANEOUS at 05:58

## 2024-05-13 ASSESSMENT — PAIN SCALES - GENERAL
PAINLEVEL_OUTOF10: 0
PAINLEVEL_OUTOF10: 0

## 2024-05-13 NOTE — PROGRESS NOTES
Patient on HHFNC 30lpm and 40%.  SPO2 in the mid 90'S.  Patient has weak barky cough.  Vest therapy and oral suctioning be done Q4wa.

## 2024-05-13 NOTE — PLAN OF CARE
Problem: SLP Adult - Impaired Swallowing  Goal: By Discharge: Advance to least restrictive diet without signs or symptoms of aspiration for planned discharge setting.  See evaluation for individualized goals.  Description: Patient will:  1. Tolerate PO trials with 0 s/s overt distress in 4/5 trials  2. Utilize compensatory swallow strategies/maneuvers (decrease bite/sip, size/rate, alt. liq/sol) with min cues in 4/5 trials  3. Perform oral-motor/laryngeal exercises to increase oropharyngeal swallow function with min cues  4. Complete an objective swallow study (i.e., MBSS) to assess swallow integrity, r/o aspiration, and determine of safest LRD, min A    Recommend:   NPO with alternate means of nutrition/hydration vs comfort feeds   Strict aspiration precautions (HOB >30 degrees at all times, Oral care TID)    Outcome: Progressing   SPEECH LANGUAGE PATHOLOGY BEDSIDE SWALLOW EVALUATION    Patient: Paul Bobby (83 y.o. male)  Date: 5/13/2024  Primary Diagnosis: Acute respiratory failure (HCC) [J96.00]  Altered mental status, unspecified altered mental status type [R41.82]  Acute hypoxemic respiratory failure (HCC) [J96.01]  AMS (altered mental status) [R41.82]       Precautions: Aspiration  PLOF: As per H&P  ASSESSMENT:  Based on the objective data described below, the patient presents with mod oral and mod-sev pharyngeal dysphagia. Pt on 20L 30% FiO2 HFNC upon SLP arrival. He was nonverbal throughout evaluation, unable to follow commands. He demo drop in O2 and watery eyes s/p 1 tsp honey-thick and ice chip trials. Weak/delayed laryngeal elevation/excursion to palpation. He currently appears to be at a high risk of aspiration, malnutrition, and dehydration with PO. Recommend NPO with consideration of an alternate means of nutrition/hydration vs comfort feeds, aspiration precautions, and oral care TID. D/w Dr. Oakley. ST will continue to follow.     Patient will benefit from skilled intervention to address  care.  []            Patient/family agree to work toward stated goals and plan of care.  []            Patient understands intent and goals of therapy, neutral about participation.  [x]            Patient unable to participate in goal setting/plan of care secondary to cognition, hearing/vision deficits; education ongoing with interdisciplinary staff   []            Handout regarding diet recommendations and thickener instructions provided.  [x]         Posted safety precautions in patient's room.    Thank you for this referral.    Radha Ga M.S., CCC-SLP/L  Speech-Language Pathologist

## 2024-05-13 NOTE — INTERDISCIPLINARY ROUNDS
[facility]  Pulmonary, Critical Care, and Sleep Medicine  Interdisciplinary and Ventilator Weaning Rounds    Patient discussed in morning walking rounds and interdisciplinary rounds.    ICU admission day: 5/7    Overnight events:   No acute evnets    Assessments and best practice:  Ventilator  Intubated 5/6, extubated 5/12   Glycemic control  Diet  Diet NPO  ADULT TUBE FEEDING; Orogastric; Peptide Based; Continuous; 20; Yes; 10; Q 8 hours; 70; 30; Q 4 hours  Stress ulcer prophylaxis.  Pepcid  DVT prophylaxis.  SQH  Need for Lines, trejo assessed.  Yes  Restraint Reevaluation   Not needed   Disposition regarding transferring out of the ICU  yellow      Family contact/MPOA:   Family updated by ICU team    Palliative consult within 3 days of admission to ICU-  Ethics Guidance: 21 days      Daily Plans:  PT/OT, SLP  Downgrade to stepdown after SLP, might need dobhoff       ISAIAH time 10 minutes        SAMRI GALEANO PA-C  05/13/24  Pulmonary, Critical Care Medicine  [facility]

## 2024-05-13 NOTE — PLAN OF CARE
Problem: Safety - Adult  Goal: Free from fall injury  5/13/2024 0307 by Alina Luis, RN  Outcome: Progressing  5/13/2024 0306 by Alina Luis, RN  Outcome: Progressing     Problem: Safety - Medical Restraint  Goal: Remains free of injury from restraints (Restraint for Interference with Medical Device)  Description: INTERVENTIONS:  1. Determine that other, less restrictive measures have been tried or would not be effective before applying the restraint  2. Evaluate the patient's condition at the time of restraint application  3. Inform patient/family regarding the reason for restraint  4. Q2H: Monitor safety, psychosocial status, comfort, nutrition and hydration  Outcome: Completed  Flowsheets (Taken 5/12/2024 1400 by Prem Monsalve, RN)  Remains free of injury from restraints (restraint for interference with medical device):   Determine that other, less restrictive measures have been tried or would not be effective before applying the restraint   Evaluate the patient's condition at the time of restraint application   Inform patient/family regarding the reason for restraint   Every 2 hours: Monitor safety, psychosocial status, comfort, nutrition and hydration

## 2024-05-13 NOTE — PROGRESS NOTES
Palliative Medicine   [facility] [phone]  [facility] [phone]    CODE STATUS: FULL CODE / FULL AGGRESSIVE MEASURES    AMD STATUS:  Aimee Crump, child, is Primary MPoA, per document on file.     Palliative team Palliative team, Dr. DOROTHY Mendez MD with Palliative Medicine and this LMSW met with pt at bedside for follow up visit. Upon entry, pt laying in bed with head elevated, AAOX1. Pt looked at speaker when spoken to. Pt attempted to engage in conversation, unable to do so at this time.      Thank you for this referral to Palliative Care. The palliative care team remains available to provide support to patient and their family.     Lana Marte, JOSE, APHSW-C  Palliative Medicine Inpatient   [facility]  [phone]  [facility]   Palliative COPE Line: [phone] ([phone])

## 2024-05-13 NOTE — PROGRESS NOTES
attended the interdisciplinary rounds for Paul Bobby, who is a 83 y.o.,male. Patient's Primary Language is: English.   According to the patient's EMR Alevism Affiliation is: Seventh day Qu[employer].     The reason the Patient came to the hospital is:   Patient Active Problem List    Diagnosis Date Noted    ESTRELLA (acute kidney injury) ([facility]) 05/08/2024    Septic shock ([facility]) 05/08/2024    Lactic acidosis 05/08/2024    Multifocal pneumonia 05/08/2024    Metabolic acidosis 05/08/2024    Aspiration pneumonia of both lungs due to vomit (HCC) 05/07/2024    Aspiration of food 05/07/2024    Mucus plugging of bronchi 05/07/2024    Acute encephalopathy 05/07/2024    Acute hypoxemic respiratory failure ([facility]) 05/07/2024    AMS (altered mental status) 05/06/2024    Acute respiratory failure (HCC) 05/06/2024    Dementia ([facility]) 11/04/2022    Syncope 11/01/2022    Weakness 10/31/2022          Plan:   participated and listen to the recommendations of the IDR team.    Patient extubated on 5-12-24. Dementia. Not very oriented.     Chaplains will continue to follow and will provide pastoral care on an as needed/requested basis.     recommends bedside caregivers page  on duty if patient shows signs of acute spiritual or emotional distress.     Kb Zepeda  Staff   Spiritual Health   [phone]

## 2024-05-13 NOTE — PLAN OF CARE
Problem: Physical Therapy - Adult  Goal: By Discharge: Performs mobility at highest level of function for planned discharge setting.  See evaluation for individualized goals.  Description: Physical Therapy Goals  Initiated 5/13/2024 and to be accomplished within 7 day(s)  1.  Patient will follow 75% of commands.   2.  Patient will roll side to side in bed with moderate assistance.    3.  Patient will move from supine to sit and sit to supine  in bed with moderate assistance.    4.  Patient will transfer from bed to chair and chair to bed with moderate assistance  using the least restrictive device.  5.  Patient will demonstrate seated balance at edge of bed for 8 minutes with supervision.   6.  Patient will perform sit to stand with moderate assistance.  7.  Patient will ambulate with moderate assistance for 10 feet with the least restrictive device.     PLOF: Lives with daughter. First floor set-up of two story home. 2 steps to enter. Amb with cane. Attends [facility] Mon-Fri.   Outcome: Progressing  PHYSICAL THERAPY RE-EVALUATION    Patient: Paul Bobby (83 y.o. male)  Date: 5/13/2024  Primary Diagnosis: Acute respiratory failure (HCC) [J96.00]  Altered mental status, unspecified altered mental status type [R41.82]  Acute hypoxemic respiratory failure (HCC) [J96.01]  AMS (altered mental status) [R41.82]  Precautions: Fall Risk, Contact Precautions  ASSESSMENT :  Re-evaluation s/p new orders post extubation 5/12/2024; goals reviewed and updated as indicated. Eyes open spontaneously. Follows 10% of commands. Max A x2 for rolling. Total A x2 for scooting up in bed. Positioned with wedges for offloading and prevalon boots to bilateral heels for skin protection. No functional AROM BLE; wiggles toes minimally. Resists/limited PROM LLE; no knee flexion past 30 degrees. RLE PROM 50% of full; no resistance. Left seated in bed with HOB elevated. Vitals: /75, HR 80, O2 saturation 90% on 20L 40%fiO2 hiflow O2.  Will

## 2024-05-13 NOTE — PROGRESS NOTES
Palliative Medicine follow-up progress note  [facility]: [phone]  [facility]: [phone]     Patient Name: Paul Bobby  YOB: 1940    Date of Initial Consult: 5/7/24  Date of service:5/13/24  Reason for Consult: goals of care discussion/ACP/symptoms management  Requesting Provider:  Renetta Sanchez, OBED - NP   Primary Care Physician: Pamela Rodrigues MD      SUMMARY:     Paul Bobby is a 83 y.o. with a past history of COPD, diabetes, hyperlipidemia, and dementia, who was [facility] on [facility] with a diagnosis of acute hypoxic respiratory failure, aspiration pneumonia in setting of parainfluenza 3.  Patient was initially brought to emergency room for further evaluation of persistent nausea and vomiting that started at [facility].  Initially patient was placed on NRB followed by BiPAP due to acute hypoxic respiratory failure.  Due to patient's mental function and an episode of vomiting while on BiPAP, patient was subsequently intubated and transferred to ICU.  Patient is currently on IV pressors and IV antibiotics. Current medical issues leading to Palliative Medicine involvement include: To establish goals of care.    5/13/24: Patient was seen in presence of palliative care staff member.  Patient is currently on high flow oxygen with liters flow of 30 and FiO2 40 percentage.  He is awake but seems to be very confused and tired.  No family members at bedside currently.    5/7/24: Patient was seen in presence of palliative care staff member.  Patient is currently sedated.  He is on FiO2 of 100% and PEEP of 8.  He is also on IV pressor.  No family members are available at bedside currently.     HISTORY:     History obtained from: Medical records    CHIEF COMPLAINT: Nausea and vomiting    HPI/SUBJECTIVE:    The patient is:   [x] Verbal and participatory: Limited due to acute debility  [] Non-participatory due to:         There are no family members at bedside currently.  No new clinical issues overnight per ICU staff.  Patient is unable to engage in goals of care conversation.  Patient has AMD on file and we wanted to talk to him to get more clarity about his decision on his AMD but seems like we may have to wait for a day or 2 to see if he makes any clinical progress with current treatment and able to participate in his decision-making process.  We had a conversation with patient's daughter at length last week and she is aware that patient's guarded prognosis and risk of sudden cardiac death.  Plan: Full code with full interventions, but team member will follow this patient after 48 hours to see if he can participate in his goals of care conversation.    5/7/24: Patient was seen in presence of palliative care RN.  Patient is currently sedated and intubated.  He is on FiO2 of 100% and PEEP of 8.  The chart was reviewed thoroughly.  We also found AMD from [place] that was done in 2018.  As per ICU staff, patient is going for bronchoscopy later today.  Patient has been on antibiotics Zosyn, Flagyl, vancomycin with nebs.  He is also getting Precedex, Levophed and vasopressin.  Due to patient's current mental and clinical status, he is unable to participate in his decision-making process.  We spoke to patient's daughter Mr. Yu over the phone and introduced ourselves.  As per her: Patient has been living with her for last 5 years and [facility] but [facility].  Patient has 3 children.  Patient's son lives in [place].  Patient has dementia and COPD but has been clinically getting better for last few months.  She also mentioned that patient has DURABLE POWER OF  paperwork at home and she will send us a copy of it via email.  She also mentioned that her sister Ms. Bella has recently came into town to help to take care of their father.  We did talk to her

## 2024-05-13 NOTE — PROGRESS NOTES
Attending Non-violent Restraint Reevaluation     I have reevaluated the patient one hour after initiation of intervention. The patient is comfortable, uninjured, but continues to pose an imminent risk of injury to self to themselves and/or serious disruption of medical treatment required to keep patient stable.     The patient's current medical and behavioral conditions that warrant the use intervention include pulling on NGT.  Restraint or seclusion will be discontinued at the earliest possible time, regardless of the scheduled expiration of the order.    Based on my evaluation, restraints will be continued: Yes          Trish Santos PA-C  05/13/24  Pulmonary, Critical Care Medicine  [facility]

## 2024-05-13 NOTE — PROGRESS NOTES
Legionella Antigen, Urine Negative       Strep Pneumoniae Antigen [[id number]] Collected: 05/06/24 1927    Order Status: Completed Specimen: Urine, random Updated: 05/07/24 0055     STREP PNEUMONIAE ANTIGEN, URINE Negative       Respiratory Panel, Molecular, with COVID-19 (Restricted: peds pts or suitable admitted adults) [[id number]]  (Abnormal) Collected: 05/06/24 1827    Order Status: Completed Specimen: Nasopharyngeal Updated: 05/06/24 2105     Adenovirus by PCR Not detected        Coronavirus 229E by PCR Not detected        Coronavirus HKU1 by PCR Not detected        Coronavirus NL63 by PCR Not detected        Coronavirus OC43 by PCR Not detected        SARS-CoV-2, PCR Not detected        Human Metapneumovirus by PCR Not detected        Rhinovirus Enterovirus PCR Not detected        Influenza A by PCR Not detected        Influenza B PCR Not detected        Parainfluenza 1 PCR Not detected        Parainfluenza 2 PCR Not detected        Parainfluenza 3 PCR Detected        Parainfluenza 4 PCR Not detected        Respiratory Syncytial Virus by PCR Not detected        Bordetella parapertussis by PCR Not detected        Bordetella pertussis by PCR Not detected        Chlamydophila Pneumonia PCR Not detected        Mycoplasma pneumo by PCR Not detected       Blood Culture 2 [[id number]] Collected: 05/06/24 1807    Order Status: Completed Specimen: Blood Updated: 05/12/24 0601     Special Requests NO SPECIAL REQUESTS        Culture NO GROWTH 6 DAYS       Blood Culture 1 [[id number]]  (Abnormal) Collected: 05/06/24 1800    Order Status: Completed Specimen: Blood Updated: 05/08/24 1356     Special Requests NO SPECIAL REQUESTS        Gram Stain       AEROBIC BOTTLE Gram positive cocci IN GROUPS                  SMEAR CALLED TO AND CORRECTLY REPEATED BY: RAQUEL GARCIA ICU ON 7MAY24 AT 1605 HRS TO 4410.           Culture       STAPHYLOCOCCUS SPECIES, COAGULASE NEGATIVE GROWING IN 1 OF 2 BOTTLES DRAWN No Site Indicated       Syncope    Dementia (HCC)    AMS (altered mental status)    Acute respiratory failure (HCC)    Aspiration pneumonia of both lungs due to vomit (HCC)    Aspiration of food    Mucus plugging of bronchi    Acute encephalopathy    Acute hypoxemic respiratory failure (HCC)    ESTRELLA (acute kidney injury) (HCC)    Septic shock (HCC)    Lactic acidosis    Multifocal pneumonia    Metabolic acidosis        RECOMMENDATIONS:   Neuro: Avoid sedation, routine neuro checks  Pulm: Titrate FiO2 for goal SPO2> 90%,PRN duonebs. Continue steroids, pulmonary hygiene care, Aspiration precautions, Keep HOB >30 degrees, s/p several bronchs this admission, continues to have heavy secretions  CVS: Maintain MAP >65mmHg, = ECHO  5/08/24 EF 55-60%.   GI: SUP, Trend LFTs, Zofran PRN for N/V, SLP and possible NGT placement. con't Colace, Miralax prn  Renal:  Trend Renal indices, Strict Is/Os, Trejo (+)  Hem/Onc: Monitor for s/o active bleeding.   I/D:Sepsis bundle per hospital protocol, Blood (coagulase negative staph 1/4 bottles) Blood cx PCR +MRSA. Likely contaminant. Sputum Cx with MRSA and Klebsiella. Antibiotics:Vanco/Zosyn Trend WBCs and temperature curve. RVP + parainfluenza, acetaminophen, salicylate, and ethanol level negative   Endocrine: Q6 glucoses, SSI. TSH level 9.37 , free T4 1.4   Metabolic:  Daily BMP, mag, phos. Trend lytes, replace as needed.   Musc/Skin: no acute issues, wound care  Full code No additional code details  Discussed in interdisciplinary rounds     Best practice :    Glycemic control  IHI ICU bundles:   Stress ulcer prophylaxis.   Not indicated  DVT prophylaxis.   SQH  Need for Lines, trejo assessed.  Palliative care evaluation.  Restraints not needed.         ISAIAH time 16 minutes    SAMIR GALEANO PA-C  05/13/24  Pulmonary, Critical Care Medicine  [facility]

## 2024-05-13 NOTE — PROGRESS NOTES
Plan to titrate off HFNC. Currently at 25L/40%. SpO2 94%. Doing CPT vest on 10Hz for 10 min. Patient is able to cough. Suction mouth.

## 2024-05-14 ENCOUNTER — APPOINTMENT (OUTPATIENT)
Facility: HOSPITAL | Age: 84
End: 2024-05-14
Payer: MEDICARE

## 2024-05-14 LAB
ANION GAP SERPL CALC-SCNC: 6 MMOL/L (ref 3–18)
ARTERIAL PATENCY WRIST A: POSITIVE
BACTERIA SPEC CULT: NORMAL
BACTERIA SPEC CULT: NORMAL
BASE DEFICIT BLD-SCNC: 1.7 MMOL/L
BASOPHILS # BLD: 0 K/UL (ref 0–0.1)
BASOPHILS NFR BLD: 0 % (ref 0–2)
BDY SITE: ABNORMAL
BUN SERPL-MCNC: 12 MG/DL (ref 7–18)
BUN/CREAT SERPL: 15 (ref 12–20)
CALCIUM SERPL-MCNC: 8.5 MG/DL (ref 8.5–10.1)
CHLORIDE SERPL-SCNC: 112 MMOL/L (ref 100–111)
CO2 SERPL-SCNC: 24 MMOL/L (ref 21–32)
CREAT SERPL-MCNC: 0.8 MG/DL (ref 0.6–1.3)
DIFFERENTIAL METHOD BLD: ABNORMAL
EOSINOPHIL # BLD: 0.2 K/UL (ref 0–0.4)
EOSINOPHIL NFR BLD: 2 % (ref 0–5)
ERYTHROCYTE [DISTWIDTH] IN BLOOD BY AUTOMATED COUNT: 14.3 % (ref 11.6–14.5)
GAS FLOW.O2 O2 DELIVERY SYS: ABNORMAL
GLUCOSE BLD STRIP.AUTO-MCNC: 121 MG/DL (ref 70–110)
GLUCOSE BLD STRIP.AUTO-MCNC: 125 MG/DL (ref 70–110)
GLUCOSE BLD STRIP.AUTO-MCNC: 138 MG/DL (ref 70–110)
GLUCOSE BLD STRIP.AUTO-MCNC: 144 MG/DL (ref 70–110)
GLUCOSE BLD STRIP.AUTO-MCNC: 161 MG/DL (ref 70–110)
GLUCOSE SERPL-MCNC: 136 MG/DL (ref 74–99)
HCO3 BLD-SCNC: 20.8 MMOL/L (ref 22–26)
HCT VFR BLD AUTO: 34.2 % (ref 36–48)
HGB BLD-MCNC: 10.9 G/DL (ref 13–16)
IMM GRANULOCYTES # BLD AUTO: 0.1 K/UL (ref 0–0.04)
IMM GRANULOCYTES NFR BLD AUTO: 1 % (ref 0–0.5)
LACTATE BLD-SCNC: 0.89 MMOL/L (ref 0.4–2)
LYMPHOCYTES # BLD: 1.5 K/UL (ref 0.9–3.6)
LYMPHOCYTES NFR BLD: 17 % (ref 21–52)
MAGNESIUM SERPL-MCNC: 2.1 MG/DL (ref 1.6–2.6)
MCH RBC QN AUTO: 30.2 PG (ref 24–34)
MCHC RBC AUTO-ENTMCNC: 31.9 G/DL (ref 31–37)
MCV RBC AUTO: 94.7 FL (ref 78–100)
MONOCYTES # BLD: 0.8 K/UL (ref 0.05–1.2)
MONOCYTES NFR BLD: 9 % (ref 3–10)
NEUTS SEG # BLD: 6.4 K/UL (ref 1.8–8)
NEUTS SEG NFR BLD: 71 % (ref 40–73)
NRBC # BLD: 0 K/UL (ref 0–0.01)
NRBC BLD-RTO: 0 PER 100 WBC
O2/TOTAL GAS SETTING VFR VENT: 40 %
PCO2 BLD: 28.5 MMHG (ref 35–45)
PH BLD: 7.47 (ref 7.35–7.45)
PHOSPHATE SERPL-MCNC: 3.1 MG/DL (ref 2.5–4.9)
PLATELET # BLD AUTO: 260 K/UL (ref 135–420)
PMV BLD AUTO: 10.1 FL (ref 9.2–11.8)
PO2 BLD: 57 MMHG (ref 80–100)
POTASSIUM SERPL-SCNC: 3.7 MMOL/L (ref 3.5–5.5)
RBC # BLD AUTO: 3.61 M/UL (ref 4.35–5.65)
SAO2 % BLD: 91.6 % (ref 92–97)
SERVICE CMNT-IMP: ABNORMAL
SERVICE CMNT-IMP: NORMAL
SERVICE CMNT-IMP: NORMAL
SODIUM SERPL-SCNC: 142 MMOL/L (ref 136–145)
SPECIMEN TYPE: ABNORMAL
WBC # BLD AUTO: 8.9 K/UL (ref 4.6–13.2)

## 2024-05-14 PROCEDURE — 97530 THERAPEUTIC ACTIVITIES: CPT

## 2024-05-14 PROCEDURE — 99291 CRITICAL CARE FIRST HOUR: CPT | Performed by: INTERNAL MEDICINE

## 2024-05-14 PROCEDURE — 6370000000 HC RX 637 (ALT 250 FOR IP): Performed by: INTERNAL MEDICINE

## 2024-05-14 PROCEDURE — 85025 COMPLETE CBC W/AUTO DIFF WBC: CPT

## 2024-05-14 PROCEDURE — 83605 ASSAY OF LACTIC ACID: CPT

## 2024-05-14 PROCEDURE — 36415 COLL VENOUS BLD VENIPUNCTURE: CPT

## 2024-05-14 PROCEDURE — 6360000002 HC RX W HCPCS: Performed by: NURSE PRACTITIONER

## 2024-05-14 PROCEDURE — APPSS15 APP SPLIT SHARED TIME 0-15 MINUTES: Performed by: NURSE PRACTITIONER

## 2024-05-14 PROCEDURE — 94669 MECHANICAL CHEST WALL OSCILL: CPT

## 2024-05-14 PROCEDURE — 2000000000 HC ICU R&B

## 2024-05-14 PROCEDURE — 2580000003 HC RX 258: Performed by: INTERNAL MEDICINE

## 2024-05-14 PROCEDURE — 36600 WITHDRAWAL OF ARTERIAL BLOOD: CPT

## 2024-05-14 PROCEDURE — 6360000002 HC RX W HCPCS: Performed by: INTERNAL MEDICINE

## 2024-05-14 PROCEDURE — 6370000000 HC RX 637 (ALT 250 FOR IP): Performed by: EMERGENCY MEDICINE

## 2024-05-14 PROCEDURE — 6360000002 HC RX W HCPCS: Performed by: REGISTERED NURSE

## 2024-05-14 PROCEDURE — 82803 BLOOD GASES ANY COMBINATION: CPT

## 2024-05-14 PROCEDURE — 80048 BASIC METABOLIC PNL TOTAL CA: CPT

## 2024-05-14 PROCEDURE — 6370000000 HC RX 637 (ALT 250 FOR IP): Performed by: NURSE PRACTITIONER

## 2024-05-14 PROCEDURE — 84100 ASSAY OF PHOSPHORUS: CPT

## 2024-05-14 PROCEDURE — 71045 X-RAY EXAM CHEST 1 VIEW: CPT

## 2024-05-14 PROCEDURE — 97166 OT EVAL MOD COMPLEX 45 MIN: CPT

## 2024-05-14 PROCEDURE — 83735 ASSAY OF MAGNESIUM: CPT

## 2024-05-14 PROCEDURE — 94640 AIRWAY INHALATION TREATMENT: CPT

## 2024-05-14 PROCEDURE — 82962 GLUCOSE BLOOD TEST: CPT

## 2024-05-14 PROCEDURE — 2580000003 HC RX 258: Performed by: REGISTERED NURSE

## 2024-05-14 PROCEDURE — 2700000000 HC OXYGEN THERAPY PER DAY

## 2024-05-14 PROCEDURE — 94668 MNPJ CHEST WALL SBSQ: CPT

## 2024-05-14 PROCEDURE — 94761 N-INVAS EAR/PLS OXIMETRY MLT: CPT

## 2024-05-14 RX ORDER — MULTIVIT-MIN/FERROUS GLUCONATE 9 MG/15 ML
15 LIQUID (ML) ORAL DAILY
Status: DISCONTINUED | OUTPATIENT
Start: 2024-05-14 | End: 2024-05-23

## 2024-05-14 RX ADMIN — ALBUTEROL SULFATE 2.5 MG: 2.5 SOLUTION RESPIRATORY (INHALATION) at 00:26

## 2024-05-14 RX ADMIN — DOCUSATE SODIUM LIQUID 100 MG: 100 LIQUID ORAL at 09:14

## 2024-05-14 RX ADMIN — DOCUSATE SODIUM LIQUID 100 MG: 100 LIQUID ORAL at 19:47

## 2024-05-14 RX ADMIN — HEPARIN SODIUM 5000 UNITS: 5000 INJECTION INTRAVENOUS; SUBCUTANEOUS at 05:36

## 2024-05-14 RX ADMIN — ALBUTEROL SULFATE 2.5 MG: 2.5 SOLUTION RESPIRATORY (INHALATION) at 08:23

## 2024-05-14 RX ADMIN — ACETAMINOPHEN 650 MG: 325 TABLET ORAL at 20:05

## 2024-05-14 RX ADMIN — HEPARIN SODIUM 5000 UNITS: 5000 INJECTION INTRAVENOUS; SUBCUTANEOUS at 22:18

## 2024-05-14 RX ADMIN — SODIUM CHLORIDE SOLN NEBU 3% 4 ML: 3 NEBU SOLN at 11:51

## 2024-05-14 RX ADMIN — SODIUM CHLORIDE SOLN NEBU 3% 4 ML: 3 NEBU SOLN at 03:45

## 2024-05-14 RX ADMIN — ALBUTEROL SULFATE 2.5 MG: 2.5 SOLUTION RESPIRATORY (INHALATION) at 20:49

## 2024-05-14 RX ADMIN — Medication 15 ML: at 11:47

## 2024-05-14 RX ADMIN — PIPERACILLIN AND TAZOBACTAM 3375 MG: 3; .375 INJECTION, POWDER, FOR SOLUTION INTRAVENOUS at 04:23

## 2024-05-14 RX ADMIN — SODIUM CHLORIDE SOLN NEBU 3% 4 ML: 3 NEBU SOLN at 20:49

## 2024-05-14 RX ADMIN — SODIUM CHLORIDE SOLN NEBU 3% 4 ML: 3 NEBU SOLN at 15:35

## 2024-05-14 RX ADMIN — ALBUTEROL SULFATE 2.5 MG: 2.5 SOLUTION RESPIRATORY (INHALATION) at 11:51

## 2024-05-14 RX ADMIN — ALBUTEROL SULFATE 2.5 MG: 2.5 SOLUTION RESPIRATORY (INHALATION) at 15:35

## 2024-05-14 RX ADMIN — SODIUM CHLORIDE SOLN NEBU 3% 4 ML: 3 NEBU SOLN at 23:57

## 2024-05-14 RX ADMIN — BUDESONIDE 1 MG: 1 SUSPENSION RESPIRATORY (INHALATION) at 08:23

## 2024-05-14 RX ADMIN — ALBUTEROL SULFATE 2.5 MG: 2.5 SOLUTION RESPIRATORY (INHALATION) at 03:45

## 2024-05-14 RX ADMIN — HEPARIN SODIUM 5000 UNITS: 5000 INJECTION INTRAVENOUS; SUBCUTANEOUS at 14:30

## 2024-05-14 RX ADMIN — ALBUTEROL SULFATE 2.5 MG: 2.5 SOLUTION RESPIRATORY (INHALATION) at 23:57

## 2024-05-14 RX ADMIN — SODIUM CHLORIDE SOLN NEBU 3% 4 ML: 3 NEBU SOLN at 08:23

## 2024-05-14 RX ADMIN — VANCOMYCIN 1250 MG: 1.75 INJECTION, SOLUTION INTRAVENOUS at 12:30

## 2024-05-14 RX ADMIN — THIAMINE HYDROCHLORIDE 100 MG: 100 INJECTION, SOLUTION INTRAMUSCULAR; INTRAVENOUS at 09:14

## 2024-05-14 RX ADMIN — CHLORHEXIDINE GLUCONATE 15 ML: 1.2 RINSE ORAL at 09:15

## 2024-05-14 RX ADMIN — BUDESONIDE 1 MG: 1 SUSPENSION RESPIRATORY (INHALATION) at 20:49

## 2024-05-14 RX ADMIN — SODIUM CHLORIDE SOLN NEBU 3% 4 ML: 3 NEBU SOLN at 00:26

## 2024-05-14 RX ADMIN — CHLORHEXIDINE GLUCONATE 15 ML: 1.2 RINSE ORAL at 19:47

## 2024-05-14 ASSESSMENT — PAIN SCALES - GENERAL
PAINLEVEL_OUTOF10: 0

## 2024-05-14 ASSESSMENT — PULMONARY FUNCTION TESTS: PEFR_L/MIN: 94

## 2024-05-14 NOTE — PLAN OF CARE
Problem: Occupational Therapy - Adult  Goal: By Discharge: Performs self-care activities at highest level of function for planned discharge setting.  See evaluation for individualized goals.  Description: Occupational Therapy Goals:  Initiated 5/14/2024 to be met within 7-10 days.    1.  Patient will perform grooming with minimal assistance/contact guard assist.   2.  Patient will perform upper body dressing with minimal assistance/contact guard assist.  3.  Patient will perform bed mobility with minimal assistance in preparation for seated ADL tasks.   4.  Patient will perform toilet transfers with minimal assistance/contact guard assist.  5.  Patient will perform all aspects of toileting with minimal assistance/contact guard assist.  6.  Patient will participate in upper extremity therapeutic exercise/activities with minimal assistance/contact guard assist for 8-10 minutes to increase strength/endurance for ADLs.    7.  Patient will utilize energy conservation techniques during functional activities with verbal cues.    PLOF: PLOF obtained from chart review. Pt lives w/ family in a two level house, able to live on main level, walk in shower. Pt has cane. Unsure how much assistance pt needed w/ ADLs and mobility.     Outcome: Progressing     OCCUPATIONAL THERAPY EVALUATION    Patient: Paul Bobby (83 y.o. male)  Date: 5/14/2024  Primary Diagnosis: Acute respiratory failure (HCC) [J96.00]  Altered mental status, unspecified altered mental status type [R41.82]  Acute hypoxemic respiratory failure (HCC) [J96.01]  AMS (altered mental status) [R41.82]       Precautions: General Precautions, Fall Risk,    ASSESSMENT :  RN  clears pt for OT evaluation. RUE soft restraint in place. Pt very lethargic upon Ot's entry, but awakens with verbal and tactile stimuli. Pt follows basic commands inconsistently, ~50% of the time. Pt's speech and very soft and garbled, difficult to understand. LUE grossly 3/5, RUE grossly 4/5,  but difficult to assess due to pt's inconsistent command following. Pt requires max A to roll L>< R. Total A to scoot toward HOB. Pt left semi supine, all needs in reach and lines in tact, RUE soft restraint in tact,.     DEFICITS/IMPAIRMENTS:  Performance deficits / Impairments: Decreased functional mobility ;Decreased ADL status;Decreased endurance;Decreased strength;Decreased balance;Decreased safe awareness;Decreased cognition;Decreased fine motor control;Decreased high-level IADLs    Patient will benefit from skilled intervention to address the above impairments.  Patient's rehabilitation potential/Prognosis: Poor.  Factors which may influence rehabilitation potential include:   []             None noted  [x]             Mental ability/status  [x]             Medical condition  []             Home/family situation and support systems  []             Safety awareness  []             Pain tolerance/management  []             Other:      PLAN :  Recommendations and Planned Interventions:   [x]               Self Care Training                  [x]      Therapeutic Activities  [x]               Functional Mobility Training   []      Cognitive Retraining  [x]               Therapeutic Exercises           [x]      Endurance Activities  [x]               Balance Training                    []      Neuromuscular Re-Education  []               Visual/Perceptual Training     [x]      Home Safety Training  [x]               Patient Education                   [x]      Family Training/Education  []               Other (comment):    Frequency/Duration: Patient will be followed by occupational therapy to address goals, 1-2 times per day/3-5 days per week to address goals.    Further Equipment Recommendations for Discharge: TBD at next site of care    [facility]: AM-PAC Inpatient Daily Activity Raw Score: 12    Current research shows that an AM-PAC score of 17 or less is not associated with a discharge to the patient's home

## 2024-05-14 NOTE — PROGRESS NOTES
Speech Pathology:     Per ICU: hold off on follow up treatment this date secondary to increased O2 requirements. Will follow up at a later date/time or as medical condition permits.     Thank you for this referral.    Radha Ga M.S., CCC-SLP/L  Speech-Language Pathologist

## 2024-05-14 NOTE — INTERDISCIPLINARY ROUNDS
[facility]  Pulmonary, Critical Care, and Sleep Medicine  Interdisciplinary and Ventilator Weaning Rounds    Patient discussed in morning walking rounds and interdisciplinary rounds.    ICU admission day: 5/7    Overnight events:   No acute events overnight    Assessments and best practice:  Ventilator  Intubated 5/6, extubated 5/12 . HFNC 40% 20L  Glycemic control  Diet  Diet NPO  ADULT TUBE FEEDING; Orogastric; Peptide Based; Continuous; 20; Yes; 10; Q 8 hours; 70; 30; Q 4 hours  Stress ulcer prophylaxis.  Pepcid  DVT prophylaxis.  SQH  Need for Lines, trejo assessed.  Yes  Restraint Reevaluation   Not needed   Disposition regarding transferring out of the ICU  yellow      Family contact/MPOA:   Family updated by ICU team    Palliative consult within 3 days of admission to ICU-  Ethics Guidance: 21 days      Daily Plans:  CXR  Remove trejo      ISAIAH time 10 minutes        Fara Pinzon RN  05/14/24  Pulmonary, Critical Care Medicine  [facility]

## 2024-05-14 NOTE — PLAN OF CARE
Problem: Safety - Adult  Goal: Free from fall injury  Outcome: Progressing  Flowsheets (Taken 5/14/2024 0352)  Free From Fall Injury:   Instruct family/caregiver on patient safety   Based on caregiver fall risk screen, instruct family/caregiver to ask for assistance with transferring infant if caregiver noted to have fall risk factors     Problem: Pain  Goal: Verbalizes/displays adequate comfort level or baseline comfort level  Outcome: Progressing  Flowsheets (Taken 5/12/2024 2000 by Alina Luis, RN)  Verbalizes/displays adequate comfort level or baseline comfort level:   Encourage patient to monitor pain and request assistance   Assess pain using appropriate pain scale   Administer analgesics based on type and severity of pain and evaluate response   Implement non-pharmacological measures as appropriate and evaluate response   Consider cultural and social influences on pain and pain management   Notify Licensed Independent Practitioner if interventions unsuccessful or patient reports new pain     Problem: Chronic Conditions and Co-morbidities  Goal: Patient's chronic conditions and co-morbidity symptoms are monitored and maintained or improved  Outcome: Progressing  Flowsheets (Taken 5/12/2024 2000 by Alina Luis, RN)  Care Plan - Patient's Chronic Conditions and Co-Morbidity Symptoms are Monitored and Maintained or Improved:   Monitor and assess patient's chronic conditions and comorbid symptoms for stability, deterioration, or improvement   Collaborate with multidisciplinary team to address chronic and comorbid conditions and prevent exacerbation or deterioration   Update acute care plan with appropriate goals if chronic or comorbid symptoms are exacerbated and prevent overall improvement and discharge     Problem: Skin/Tissue Integrity  Goal: Absence of new skin breakdown  Description: 1.  Monitor for areas of redness and/or skin breakdown  2.  Assess vascular access sites hourly  3.  Every 4-6

## 2024-05-14 NOTE — PROGRESS NOTES
Physician Progress Note      PATIENT:               HOUSTON MEAD  CSN #:                  [id number]  :                       1940  ADMIT DATE:       2024 5:18 PM  DISCH DATE:  RESPONDING  PROVIDER #:        Chasity Oakley MD          QUERY TEXT:    Patient admitted with Sepsis. Noted to have RD documentation of weight loss,   dietician assessment with malnutrition diagnosis. If possible, please document   in progress notes and discharge summary if you are evaluating and /or   treating any of the following:    The medical record reflects the following:  Risk Factors: Sepsis    Clinical Indicators: RD consult: Malnutrition Assessment:  Malnutrition Status:  Severe malnutrition (24 1004)  Context:  Acute Illness  Findings of the 6 clinical characteristics of malnutrition:  Energy Intake:  50% or less of estimated energy requirements for 5 or more   days  Weight Loss:  Unable to assess  Body Fat Loss:  Moderate body fat loss Orbital  Muscle Mass Loss:  Moderate muscle mass loss Temples (temporalis), Clavicles   (pectoralis & deltoids)  Fluid Accumulation:  Unable to assess   Strength:  Not Performed    Treatment: Nutrition consulted  -Modify tube feeding regimen:  ? Formula: Glucerna 1.5  -Advance as tolerated by 10 ml q 6 hours  ? Goal Rate: 65 ml/hr x 20 hours (for anticipation of holding tube feeds for   standard nursing care)  ? Water Flushes: 50 mL q 4 hours (300 mL total)  ? Goal Regimen Provides (with modulars):  1950 kcal, 107g protein, 1288 ml   free water, 100% RDIs  -Order multivitamin due to malnutrition.  -Continue to monitor tolerance of EN, weight, labs, and plan of care during   admission.    Please call if you have any questions or need assistance. I can also be   reached via [employer] or [employer] # [phone].  Thank you,  Christina Araujo RN/CDI      ASPEN Criteria:    https://aspenjournals.onlinelibrary.bentley.com/doi/full/10.1177/[email or web address]  5  Options

## 2024-05-14 NOTE — PROGRESS NOTES
Comprehensive Nutrition Assessment    Type and Reason for Visit:  Reassess, Consult, NPO/Clear Liquid    Nutrition Recommendations/Plan:   Modify tube feeding regimen:   Formula: Glucerna 1.5  Advance as tolerated by 10 ml q 6 hours  Goal Rate: 65 ml/hr x 20 hours (for anticipation of holding tube feeds for standard nursing care)  Water Flushes: 50 mL q 4 hours (300 mL total)  Goal Regimen Provides (with modulars):  1950 kcal, 107g protein, 1288 ml free water, 100% RDIs  Order multivitamin due to malnutrition.   Continue to monitor tolerance of EN, weight, labs, and plan of care during admission.      Malnutrition Assessment:  Malnutrition Status:  Severe malnutrition (05/14/24 1004)    Context:  Acute Illness     Findings of the 6 clinical characteristics of malnutrition:  Energy Intake:  50% or less of estimated energy requirements for 5 or more days  Weight Loss:  Unable to assess     Body Fat Loss:  Moderate body fat loss Orbital   Muscle Mass Loss:  Moderate muscle mass loss Temples (temporalis), Clavicles (pectoralis & deltoids)  Fluid Accumulation:  Unable to assess     Strength:  Not Performed    Nutrition Assessment:    Presented with encephalopathy and hypoxia; resp failure and septic/hypovolemic shock. Emergently intubated 5/6 and transferred to ICU. Pt has been NPO since admit due to pressor requirements/lactate. TF initiated 5/9. Extubated 5/12. SLP following, last eval 5/13, rec'd NPO. NGT placed. Discussed care during interdisciplinary rounds. Per RN, pt feeds were resumed, currently infusing at 30 ml/hr. Plan to modify feeds. Pt has received <25% of estimated needs since admit, nutritional needs are not being met. Visual NPFE conducted.    Nutrition Related Findings:    Last BM (including prior to admit): 05/12/24  Pertinent Meds: pulmicort, colace, zosyn, thiamine, vanco Pertinent Labs: POC Glucose 107-138 mg/dl x 24 hrs, K 3.7 wnl, Phos 3.1 wnl, Mg 2.1 wnl Wound Type: None       Current

## 2024-05-14 NOTE — PROGRESS NOTES
Flow increased to 30L per MD secondary to SPO2 in low 90s     05/14/24 1014   Oxygen Therapy/Pulse Ox   O2 Flow Rate (L/min) (S)  30 L/min

## 2024-05-14 NOTE — PROGRESS NOTES
attended the interdisciplinary rounds for Paul Bobby, who is a 83 y.o.,male. Patient's Primary Language is: English.   According to the patient's EMR Worship Affiliation is: Seventh day [employer].     The reason the Patient came to the hospital is:   Patient Active Problem List    Diagnosis Date Noted    Palliative care encounter 05/13/2024    Goals of care, counseling/discussion 05/13/2024    DNR (do not resuscitate) discussion 05/13/2024    Aspiration into airway 05/13/2024    ESTRELLA (acute kidney injury) (HCC) 05/08/2024    Septic shock (HCC) 05/08/2024    Lactic acidosis 05/08/2024    Multifocal pneumonia 05/08/2024    Metabolic acidosis 05/08/2024    Aspiration pneumonia of both lungs due to vomit (HCC) 05/07/2024    Aspiration of food 05/07/2024    Mucus plugging of bronchi 05/07/2024    Acute encephalopathy 05/07/2024    Acute hypoxemic respiratory failure (HCC) 05/07/2024    AMS (altered mental status) 05/06/2024    Acute respiratory failure (HCC) 05/06/2024    Dementia (HCC) 11/04/2022    Syncope 11/01/2022    Weakness 10/31/2022          Plan:   participated and listen to the recommendations of the IDR team.    Patient follow commands, oriented but not able to communicate effectively.  Intubation was discuss concerning patient.    Chaplains will continue to follow and will provide pastoral care on an as needed/requested basis.     recommends bedside caregivers page  on duty if patient shows signs of acute spiritual or emotional distress.     Kb Elmo  Staff   Spiritual Health   [phone]

## 2024-05-14 NOTE — PROGRESS NOTES
Palliative Medicine   [facility] [phone]  [facility] [phone]    CODE STATUS:  FULL CODE / FULL AGGRESSIVE MEASURES    AMD STATUS: Aimee Crump, child, is Primary MPoA, per document on file.     Pt's case discussed in palliative rounds. This LMSW attended to pt at bedside for follow up assessment. Upon entry, pt laying in bed with head elevated, receiving High Flow O2, 20 lpm/40%.  Pt has NG tube in place. Pt resting comfortably. Did not disturb. Per speech notes, pt needs consideration for peg tube vs comfort feeds. Pt will be reassessed tomorrow for cognitive orientation for goal of care conversations.  Family will be contacted as appropriate.     Thank you for this referral to Palliative Care. The palliative care team remains available to provide support to patient and their family.     Lana Marte LMSW, APHSW-C  Palliative Medicine Inpatient   [facility]  [phone]  [facility]   Palliative COPE Line: [phone]

## 2024-05-15 PROBLEM — Z51.5 ENCOUNTER FOR PALLIATIVE CARE: Status: ACTIVE | Noted: 2024-05-15

## 2024-05-15 PROBLEM — B34.8 PARAINFLUENZA: Status: ACTIVE | Noted: 2024-05-15

## 2024-05-15 PROBLEM — J44.9 CHRONIC OBSTRUCTIVE PULMONARY DISEASE (HCC): Status: ACTIVE | Noted: 2024-05-15

## 2024-05-15 LAB
ANION GAP SERPL CALC-SCNC: 5 MMOL/L (ref 3–18)
BASOPHILS # BLD: 0 K/UL (ref 0–0.1)
BASOPHILS NFR BLD: 0 % (ref 0–2)
BUN SERPL-MCNC: 15 MG/DL (ref 7–18)
BUN/CREAT SERPL: 18 (ref 12–20)
CALCIUM SERPL-MCNC: 9.5 MG/DL (ref 8.5–10.1)
CHLORIDE SERPL-SCNC: 108 MMOL/L (ref 100–111)
CO2 SERPL-SCNC: 27 MMOL/L (ref 21–32)
CREAT SERPL-MCNC: 0.83 MG/DL (ref 0.6–1.3)
DIFFERENTIAL METHOD BLD: ABNORMAL
EOSINOPHIL # BLD: 0.1 K/UL (ref 0–0.4)
EOSINOPHIL NFR BLD: 1 % (ref 0–5)
ERYTHROCYTE [DISTWIDTH] IN BLOOD BY AUTOMATED COUNT: 14.2 % (ref 11.6–14.5)
GLUCOSE BLD STRIP.AUTO-MCNC: 112 MG/DL (ref 70–110)
GLUCOSE BLD STRIP.AUTO-MCNC: 118 MG/DL (ref 70–110)
GLUCOSE BLD STRIP.AUTO-MCNC: 141 MG/DL (ref 70–110)
GLUCOSE SERPL-MCNC: 127 MG/DL (ref 74–99)
HCT VFR BLD AUTO: 39.8 % (ref 36–48)
HGB BLD-MCNC: 12.6 G/DL (ref 13–16)
IMM GRANULOCYTES # BLD AUTO: 0.1 K/UL (ref 0–0.04)
IMM GRANULOCYTES NFR BLD AUTO: 1 % (ref 0–0.5)
LYMPHOCYTES # BLD: 1.5 K/UL (ref 0.9–3.6)
LYMPHOCYTES NFR BLD: 13 % (ref 21–52)
MAGNESIUM SERPL-MCNC: 2.3 MG/DL (ref 1.6–2.6)
MCH RBC QN AUTO: 30.4 PG (ref 24–34)
MCHC RBC AUTO-ENTMCNC: 31.7 G/DL (ref 31–37)
MCV RBC AUTO: 95.9 FL (ref 78–100)
MONOCYTES # BLD: 0.6 K/UL (ref 0.05–1.2)
MONOCYTES NFR BLD: 5 % (ref 3–10)
NEUTS SEG # BLD: 9.2 K/UL (ref 1.8–8)
NEUTS SEG NFR BLD: 80 % (ref 40–73)
NRBC # BLD: 0 K/UL (ref 0–0.01)
NRBC BLD-RTO: 0 PER 100 WBC
PHOSPHATE SERPL-MCNC: 2.7 MG/DL (ref 2.5–4.9)
PLATELET # BLD AUTO: 320 K/UL (ref 135–420)
PMV BLD AUTO: 9.6 FL (ref 9.2–11.8)
POTASSIUM SERPL-SCNC: 3.7 MMOL/L (ref 3.5–5.5)
RBC # BLD AUTO: 4.15 M/UL (ref 4.35–5.65)
SODIUM SERPL-SCNC: 140 MMOL/L (ref 136–145)
WBC # BLD AUTO: 11.6 K/UL (ref 4.6–13.2)

## 2024-05-15 PROCEDURE — 83735 ASSAY OF MAGNESIUM: CPT

## 2024-05-15 PROCEDURE — 99291 CRITICAL CARE FIRST HOUR: CPT | Performed by: INTERNAL MEDICINE

## 2024-05-15 PROCEDURE — 97110 THERAPEUTIC EXERCISES: CPT

## 2024-05-15 PROCEDURE — 94669 MECHANICAL CHEST WALL OSCILL: CPT

## 2024-05-15 PROCEDURE — 2580000003 HC RX 258: Performed by: INTERNAL MEDICINE

## 2024-05-15 PROCEDURE — 99232 SBSQ HOSP IP/OBS MODERATE 35: CPT

## 2024-05-15 PROCEDURE — 6360000002 HC RX W HCPCS: Performed by: INTERNAL MEDICINE

## 2024-05-15 PROCEDURE — 6370000000 HC RX 637 (ALT 250 FOR IP): Performed by: INTERNAL MEDICINE

## 2024-05-15 PROCEDURE — APPSS15 APP SPLIT SHARED TIME 0-15 MINUTES: Performed by: NURSE PRACTITIONER

## 2024-05-15 PROCEDURE — 6370000000 HC RX 637 (ALT 250 FOR IP): Performed by: NURSE PRACTITIONER

## 2024-05-15 PROCEDURE — 92526 ORAL FUNCTION THERAPY: CPT

## 2024-05-15 PROCEDURE — 84100 ASSAY OF PHOSPHORUS: CPT

## 2024-05-15 PROCEDURE — 85025 COMPLETE CBC W/AUTO DIFF WBC: CPT

## 2024-05-15 PROCEDURE — 94761 N-INVAS EAR/PLS OXIMETRY MLT: CPT

## 2024-05-15 PROCEDURE — 2700000000 HC OXYGEN THERAPY PER DAY

## 2024-05-15 PROCEDURE — 6370000000 HC RX 637 (ALT 250 FOR IP): Performed by: EMERGENCY MEDICINE

## 2024-05-15 PROCEDURE — 94640 AIRWAY INHALATION TREATMENT: CPT

## 2024-05-15 PROCEDURE — 36415 COLL VENOUS BLD VENIPUNCTURE: CPT

## 2024-05-15 PROCEDURE — 2000000000 HC ICU R&B

## 2024-05-15 PROCEDURE — 82962 GLUCOSE BLOOD TEST: CPT

## 2024-05-15 PROCEDURE — 94668 MNPJ CHEST WALL SBSQ: CPT

## 2024-05-15 PROCEDURE — 6360000002 HC RX W HCPCS: Performed by: NURSE PRACTITIONER

## 2024-05-15 PROCEDURE — 80048 BASIC METABOLIC PNL TOTAL CA: CPT

## 2024-05-15 RX ADMIN — BUDESONIDE 1 MG: 1 SUSPENSION RESPIRATORY (INHALATION) at 07:43

## 2024-05-15 RX ADMIN — SODIUM CHLORIDE SOLN NEBU 3% 4 ML: 3 NEBU SOLN at 07:43

## 2024-05-15 RX ADMIN — ALBUTEROL SULFATE 2.5 MG: 2.5 SOLUTION RESPIRATORY (INHALATION) at 07:43

## 2024-05-15 RX ADMIN — THIAMINE HYDROCHLORIDE 100 MG: 100 INJECTION, SOLUTION INTRAMUSCULAR; INTRAVENOUS at 08:10

## 2024-05-15 RX ADMIN — HEPARIN SODIUM 5000 UNITS: 5000 INJECTION INTRAVENOUS; SUBCUTANEOUS at 21:12

## 2024-05-15 RX ADMIN — CHLORHEXIDINE GLUCONATE 15 ML: 1.2 RINSE ORAL at 21:11

## 2024-05-15 RX ADMIN — SODIUM CHLORIDE SOLN NEBU 3% 4 ML: 3 NEBU SOLN at 11:14

## 2024-05-15 RX ADMIN — HEPARIN SODIUM 5000 UNITS: 5000 INJECTION INTRAVENOUS; SUBCUTANEOUS at 14:30

## 2024-05-15 RX ADMIN — CHLORHEXIDINE GLUCONATE 15 ML: 1.2 RINSE ORAL at 08:10

## 2024-05-15 RX ADMIN — ALBUTEROL SULFATE 2.5 MG: 2.5 SOLUTION RESPIRATORY (INHALATION) at 04:20

## 2024-05-15 RX ADMIN — SODIUM CHLORIDE SOLN NEBU 3% 4 ML: 3 NEBU SOLN at 04:20

## 2024-05-15 RX ADMIN — DOCUSATE SODIUM LIQUID 100 MG: 100 LIQUID ORAL at 21:12

## 2024-05-15 RX ADMIN — SODIUM CHLORIDE SOLN NEBU 3% 4 ML: 3 NEBU SOLN at 20:47

## 2024-05-15 RX ADMIN — ALBUTEROL SULFATE 2.5 MG: 2.5 SOLUTION RESPIRATORY (INHALATION) at 15:37

## 2024-05-15 RX ADMIN — ALBUTEROL SULFATE 2.5 MG: 2.5 SOLUTION RESPIRATORY (INHALATION) at 20:47

## 2024-05-15 RX ADMIN — Medication 15 ML: at 08:10

## 2024-05-15 RX ADMIN — VANCOMYCIN 1250 MG: 1.75 INJECTION, SOLUTION INTRAVENOUS at 23:58

## 2024-05-15 RX ADMIN — HEPARIN SODIUM 5000 UNITS: 5000 INJECTION INTRAVENOUS; SUBCUTANEOUS at 06:10

## 2024-05-15 RX ADMIN — BUDESONIDE 1 MG: 1 SUSPENSION RESPIRATORY (INHALATION) at 20:47

## 2024-05-15 RX ADMIN — ALBUTEROL SULFATE 2.5 MG: 2.5 SOLUTION RESPIRATORY (INHALATION) at 11:14

## 2024-05-15 RX ADMIN — DOCUSATE SODIUM LIQUID 100 MG: 100 LIQUID ORAL at 08:10

## 2024-05-15 RX ADMIN — SODIUM CHLORIDE SOLN NEBU 3% 4 ML: 3 NEBU SOLN at 15:37

## 2024-05-15 RX ADMIN — VANCOMYCIN 1250 MG: 1.75 INJECTION, SOLUTION INTRAVENOUS at 06:10

## 2024-05-15 ASSESSMENT — PAIN SCALES - GENERAL
PAINLEVEL_OUTOF10: 0

## 2024-05-15 NOTE — PROGRESS NOTES
Cryptococcus neoformans/gattii by PCR Not detected        Resistant gene targets          Methicillin Resistance mecA/C  by PCR Detected        Biofire test comment       False positive results may rarely occur. Correlate with clinical,epidemiologic, and other laboratory findings           Comment: Please see BCID Interpretation Guide in EPIC Links                    Imaging:  [x]   I have personally reviewed the patient’s radiographs and reports  Most recent imaging:  CT ABDOMEN PELVIS W IV CONTRAST Additional Contrast? None    Result Date: 5/6/2024  1.  No acute infectious or inflammatory process in the abdomen or pelvis. No acute findings. 2.  Moderate colonic stool burden with large rectal stool ball. Recommend correlation for constipation. 3.  Abdominal wall hernia containing short segment of small bowel without obstruction. 4.  Cholelithiasis without evidence of acute cholecystitis.    CTA CHEST W WO CONTRAST PE Eval    Result Date: 5/6/2024  1.  No pulmonary embolism. 2.  Extensive multifocal pneumonia with partial bilateral lower lobe collapse/consolidation. 3.  Extensive underlying emphysema. 4.  Sequela of chronic bronchitis with distal mucoid impaction.     CT Head W/O Contrast    Result Date: 5/6/2024  No acute intracranial findings. Further evaluation with brain MRI is recommended if there is concern for hypoxic ischemic injury.       No results found for this or any previous visit.     No valid procedures specified.     IMPRESSION:   Acute Hypoxic Respiratory Failure- requiring mechanical ventilator, in the setting multifocal PNA and parainfluenza 3, extubated 5/12 to HFNC   Aspiration PNA  Parainfluenza 3   Acute Encephalopathy-toxic/metabolic in nature superimposed on dementia, resolved back to baseline   Metabolic acidosis, lactic acidosis, resolved   ESTRELLA- prerenal azotemia vs. Ishemic ATN, resolved   Septic shock vs. hypovolemic- in the setting of multifocal PNA with partial b/l LL collapse and  consildation/Aspiration PNA. Shock resolved  Chronic bronchitis and emhpysema with distal mucoid impaction, s/p multiple bronchs   COPD- not in acute exacerbation   Moderate colonic stool burden with large rectal stool ball   cholelithiasis without evidence of acute cholecystitis   DM II   HLD      Patient Active Problem List   Diagnosis    Weakness    Syncope    Dementia (HCC)    AMS (altered mental status)    Acute respiratory failure (HCC)    Aspiration pneumonia of both lungs due to vomit (HCC)    Aspiration of food    Mucus plugging of bronchi    Acute encephalopathy    Acute hypoxemic respiratory failure (HCC)    ESTRELLA (acute kidney injury) (HCC)    Septic shock (HCC)    Lactic acidosis    Multifocal pneumonia    Metabolic acidosis    Palliative care encounter    Goals of care, counseling/discussion    DNR (do not resuscitate) discussion    Aspiration into airway        RECOMMENDATIONS:   Neuro: Avoid sedation, routine neuro checks, now alert   Pulm: Titrate FiO2 for goal SPO2> 90%,PRN duonebs. Continue pulmonary hygiene care, steroids, Aspiration precautions, Keep HOB >30 degrees, s/p several bronchs this admission, continues to have heavy secretions, continue to hypertonic nebz with albuterol and CPT,  high risk for aspiration   CVS: Maintain MAP >65mmHg, ECHO  5/08/24 EF 55-60%.   GI: SUP, Trend LFTs, Zofran PRN for N/V, continue to SLP, continue TF with dobhoff in place while attempting SLP, con't Colace, Miralax prn  Renal:  Trend Renal indices, Strict Is/Os, condom cath   Hem/Onc: Monitor for s/o active bleeding.   I/D:Sepsis bundle per hospital protocol, Blood (coagulase negative staph 1/4 bottles) Blood cx PCR +MRSA. Likely contaminant. Sputum Cx with MRSA and Klebsiella. Antibiotics:Vanco. Completed Zosyn (5/14), Trend WBCs and temperature curve. RVP + parainfluenza, acetaminophen, salicylate, and ethanol level negative   Endocrine: Q6 glucoses, SSI. TSH level 9.37 , free T4 1.4   Metabolic:  Daily BMP,

## 2024-05-15 NOTE — PLAN OF CARE
Problem: Safety - Adult  Goal: Free from fall injury  Outcome: Progressing  Flowsheets (Taken 5/14/2024 0352)  Free From Fall Injury:   Instruct family/caregiver on patient safety   Based on caregiver fall risk screen, instruct family/caregiver to ask for assistance with transferring infant if caregiver noted to have fall risk factors     Problem: Pain  Goal: Verbalizes/displays adequate comfort level or baseline comfort level  Outcome: Progressing  Flowsheets (Taken 5/14/2024 0800 by Monae Parsons RN)  Verbalizes/displays adequate comfort level or baseline comfort level:   Encourage patient to monitor pain and request assistance   Assess pain using appropriate pain scale   Administer analgesics based on type and severity of pain and evaluate response     Problem: Chronic Conditions and Co-morbidities  Goal: Patient's chronic conditions and co-morbidity symptoms are monitored and maintained or improved  Outcome: Progressing  Flowsheets (Taken 5/14/2024 2000)  Care Plan - Patient's Chronic Conditions and Co-Morbidity Symptoms are Monitored and Maintained or Improved:   Monitor and assess patient's chronic conditions and comorbid symptoms for stability, deterioration, or improvement   Collaborate with multidisciplinary team to address chronic and comorbid conditions and prevent exacerbation or deterioration   Update acute care plan with appropriate goals if chronic or comorbid symptoms are exacerbated and prevent overall improvement and discharge     Problem: Skin/Tissue Integrity  Goal: Absence of new skin breakdown  Description: 1.  Monitor for areas of redness and/or skin breakdown  2.  Assess vascular access sites hourly  3.  Every 4-6 hours minimum:  Change oxygen saturation probe site  4.  Every 4-6 hours:  If on nasal continuous positive airway pressure, respiratory therapy assess nares and determine need for appliance change or resting period.  Outcome: Progressing     Problem: Nutrition Deficit:  Goal:  Optimize nutritional status  Outcome: Progressing  Flowsheets (Taken 5/14/2024 1001 by Dockweiler, Megan, RD)  Nutrient intake appropriate for improving, restoring, or maintaining nutritional needs:   Assess nutritional status and recommend course of action   Monitor oral intake, labs, and treatment plans   Recommend appropriate diets, oral nutritional supplements, and vitamin/mineral supplements   Recommend, monitor, and adjust tube feedings and TPN/PPN based on assessed needs

## 2024-05-15 NOTE — PROGRESS NOTES
Palliative Medicine Follow Up  Patient Name: Paul Bobby  YOB: 1940  MRN: [id number]  Age: 83 y.o.  Gender: male    Date of Initial Consult: 5/7/2024  Date of Service: 5/15/2024  Time: 4:58 PM  Provider: MARTHA Pennington  Hospital Day: 10  Admit Date: 5/6/2024  Referring Provider: Renetta Sanchez NP      Reasons for Consultation:  Goals of Care/ACP/symptom management    HISTORY OF PRESENT ILLNESS (HPI):   Paul Bobby is a 83 y.o. male with a past medical history of COPD, diabetes, hyperlipidemia, and dementia, who was admitted on 5/6/2024 from [facility] with a diagnosis of acute hypoxic respiratory failure, aspiration pneumonia in setting of parainfluenza 3.  Patient was initially brought to emergency room for further evaluation of persistent nausea and vomiting that [facility] at [facility].  Initially patient was placed on NRB followed by BiPAP due to acute hypoxic respiratory failure.  Due to patient's mental function and an episode of vomiting while on BiPAP, patient was subsequently intubated and transferred to ICU.  Patient is currently on IV pressors and IV antibiotics. Current medical issues leading to Palliative Medicine involvement include: To establish goals of care.    5/15/2024 Patient seen with Lana AGUERO. Update received from MANAN Sanchez on patient who states he is high risk for aspirating again, he has not passed his swallow evaluation and may need a PEG tube versus comfort feeds. Patient seen resting with eyes closed, opened eyes to name. On HFNC, receiving tube feeds via SBFT in nare. Did not answer questions, only stated 'I think I am.'     (Notes below from previous encounters)     5/13/24: Patient was seen in presence of palliative care staff member.  Patient is currently on high flow oxygen with liters flow of 30 and FiO2 40 percentage.  He is awake but seems to be very confused and tired.  No family members at bedside currently.     5/7/24: Patient

## 2024-05-15 NOTE — PROGRESS NOTES
attended the interdisciplinary rounds for Paul Bobby, who is a 83 y.o.,male. Patient's Primary Language is: English.   According to the patient's EMR Jainism Affiliation is: Seventh day Hinduism.     The reason the Patient came to the hospital is:   Patient Active Problem List    Diagnosis Date Noted    Palliative care encounter 05/13/2024    Goals of care, counseling/discussion 05/13/2024    DNR (do not resuscitate) discussion 05/13/2024    Aspiration into airway 05/13/2024    ESTRELLA (acute kidney injury) (HCC) 05/08/2024    Septic shock (HCC) 05/08/2024    Lactic acidosis 05/08/2024    Multifocal pneumonia 05/08/2024    Metabolic acidosis 05/08/2024    Aspiration pneumonia of both lungs due to vomit (HCC) 05/07/2024    Aspiration of food 05/07/2024    Mucus plugging of bronchi 05/07/2024    Acute encephalopathy 05/07/2024    Acute hypoxemic respiratory failure (HCC) 05/07/2024    AMS (altered mental status) 05/06/2024    Acute respiratory failure (HCC) 05/06/2024    Dementia (HCC) 11/04/2022    Syncope 11/01/2022    Weakness 10/31/2022          Plan:   participated and listen to the recommendations of the IDR team.    No major occurrences overnight.      Chaplains will continue to follow and will provide pastoral care on an as needed/requested basis.     recommends bedside caregivers page  on duty if patient shows signs of acute spiritual or emotional distress.     [facility]  Staff   Spiritual Health   [phone]

## 2024-05-15 NOTE — PLAN OF CARE
Problem: SLP Adult - Impaired Swallowing  Goal: By Discharge: Advance to least restrictive diet without signs or symptoms of aspiration for planned discharge setting.  See evaluation for individualized goals.  Description: Patient will:  1. Tolerate PO trials with 0 s/s overt distress in 4/5 trials  2. Utilize compensatory swallow strategies/maneuvers (decrease bite/sip, size/rate, alt. liq/sol) with min cues in 4/5 trials  3. Perform oral-motor/laryngeal exercises to increase oropharyngeal swallow function with min cues  4. Complete an objective swallow study (i.e., MBSS) to assess swallow integrity, r/o aspiration, and determine of safest LRD, min A    Recommend:   NPO with alternate means of nutrition/hydration vs comfort feeds   Strict aspiration precautions (HOB >30 degrees at all times, Oral care TID)    Outcome: Progressing  SPEECH LANGUAGE PATHOLOGY DYSPHAGIA TREATMENT    Patient: Paul Bobby (83 y.o. male)  Date: 5/15/2024  Diagnosis: Acute respiratory failure (HCC) [J96.00]  Altered mental status, unspecified altered mental status type [R41.82]  Acute hypoxemic respiratory failure (HCC) [J96.01]  AMS (altered mental status) [R41.82] AMS (altered mental status)      Precautions: Standard, aspiration  PLOF: As per H&P      ASSESSMENT:  Pt seen for dysphagia management follow up. Pt on 40L 50% FiO2 HFNC upon SLP arrival. He was minimally verbal throughout the tx and able to intermittently follow commands. He demo'd drop in O2, watery eyes, and a delayed wet cough s/p 1 tsp moderately-thick liquid and ice chip trials. Weak/delayed laryngeal elevation/excursion to palpation. Oral care w/ suction performed. He currently appears to be at a high risk of aspiration, malnutrition, and dehydration with PO. Recommend NPO with dobhoff, aspiration precautions, and oral care TID. D/w MD and RN. ST will continue to follow.     Progression toward goals:  []         Improving appropriately and progressing toward

## 2024-05-15 NOTE — INTERDISCIPLINARY ROUNDS
[facility]  Pulmonary, Critical Care, and Sleep Medicine  Interdisciplinary and Ventilator Weaning Rounds    Patient discussed in morning walking rounds and interdisciplinary rounds.    ICU admission day: 5/7    Overnight events:   No acute events overnight    Assessments and best practice:  Ventilator  Intubated 5/6, extubated 5/12 . HFNC 40L/50%   Glycemic control  Diet  Diet NPO  ADULT TUBE FEEDING; Nasogastric; Diabetic; Continuous; 30; Yes; 10; Q 6 hours; 65; 50; Q 4 hours  Stress ulcer prophylaxis.  Not indicated   DVT prophylaxis.  SQH  Need for Lines, trejo assessed.  Yes  Restraint Reevaluation   Not needed   Disposition regarding transferring out of the ICU  yellow      Family contact/MPOA:   Family updated by ICU team    Palliative consult within 3 days of admission to ICU-  Ethics Guidance: 21 days      Daily Plans:  Cont aggressive pulmonary hygiene, CPT   SLP,  low theshold to keep NPO         ISAIAH time 5 minutes        OBED Dean - NP  05/15/24  Pulmonary, Critical Care Medicine  [facility]

## 2024-05-15 NOTE — PROGRESS NOTES
[facility] Update:  Extensive update provided to daughter, Aimee Sierra, regarding clinical status of the patient. I have expressed that he likely will continue to aspirate on his secretions and food. He has not been able to pass SLP. At this time he is currently requiring dobhoff placement and tube feedings. Due to the above in the setting of dementia, this will likely not improve. He remains on HFNC 40L/50%, which has increased over the last several days. He has required extensive pulmonary hygiene, NT suctioning, CPT, and nebs. I have discussed PEG tube placement vs. Comfort/hospice due to the above. Although the option of PEG placement has been discussed, patient has made several attempts to pull NGT, which would also likely be the case with PEG tube placement.     VERONICA León-BC  05/15/24  Pulmonary, Critical Care Medicine  [facility]

## 2024-05-15 NOTE — PLAN OF CARE
Problem: Physical Therapy - Adult  Goal: By Discharge: Performs mobility at highest level of function for planned discharge setting.  See evaluation for individualized goals.  Description: Physical Therapy Goals  Initiated 5/13/2024 and to be accomplished within 7 day(s)  1.  Patient will follow 75% of commands.   2.  Patient will roll side to side in bed with moderate assistance.    3.  Patient will move from supine to sit and sit to supine  in bed with moderate assistance.    4.  Patient will transfer from bed to chair and chair to bed with moderate assistance  using the least restrictive device.  5.  Patient will demonstrate seated balance at edge of bed for 8 minutes with supervision.   6.  Patient will perform sit to stand with moderate assistance.  7.  Patient will ambulate with moderate assistance for 10 feet with the least restrictive device.     PLOF: Lives with daughter. First floor set-up of two story home. 2 steps to enter. Amb with cane. [facility]ends [facility].   Outcome: Progressing   PHYSICAL THERAPY TREATMENT    Patient: Paul Bobby (83 y.o. male)  Date: 5/15/2024  Diagnosis: Acute respiratory failure (HCC) [J96.00]  Altered mental status, unspecified altered mental status type [R41.82]  Acute hypoxemic respiratory failure (HCC) [J96.01]  AMS (altered mental status) [R41.82] AMS (altered mental status)  Precautions: General Precautions, Fall Risk  ASSESSMENT:  Treated in ED. Eyes open spontaneously. Follows less than 10% of commands. BLE PROM performed. Prevalon boots to bilateral heels for skin protection. On 40L 50%fiO2 hiflow O2.  Will follow to monitor progress with mentation and ability to participate in skilled PT treatment. Call bell in reach.     Progression toward goals:   []      Improving appropriately and progressing toward goals  []      Improving slowly and progressing toward goals  [x]      Not making progress toward goals     PLAN:  Patient continues to benefit from skilled

## 2024-05-15 NOTE — PROGRESS NOTES
Palliative Medicine   [facility] [phone]  [facility] [phone]  CODE STATUS:  FULL CODE / FULL AGGRESSIVE MEASURES     AMD STATUS: Aimee Crump, child, is Primary MPoA, per document on file.     Palliative Care team, Mera Orourke NP and this [facility] met with pt at bedside for follow up assessment. Upon entry, pt laying in bed with head elevated, opened eyes to verbal stimuli. When asked how is doing, replies, \"I think I'm just fine.\"  Pt able to follow basic commands.  Unable to engage in complex conversation at this time.     Palliative team consulted with ICU treatment team. Per NP, pt appears to be back at baseline cognitively. She reports concern remains continuing aspiration and risks related to this and potential of having peg tube placed for long-term artificial nutrition.  Pt tried to pull out NG tube anytime he can, so concerned he would do the same with a Peg tube, due to dementia.     Palliative team reviewed pt's notes and contacted pt's daughter/MPoA, Aimee Crump, at [phone], for follow up. Aimee informed palliative team she spoke with ICU treatment team and was informed of the issues regarding pt's continuing aspiration and requirement for high flow oxygen, which is not improving as we'd like. Pt's daughter expressed shock about pts issues, due to he was very active prior to aspiration event and went to [facility] program 5 days a week, and participated in other activities as able.  She reports she is overwhelmed by this and other life stressors at this time.  NP explained, we just wanted to check on her and provide support and can call back again on Friday, to provide support and perhaps discuss other medical issues/decisions at that time. Pt's daughter verbalized understanding and agreement with this plan.     Thank you for this referral to Palliative Care. The palliative care team remains available to provide support to patient and their family.      Lana Marte LMSW, APHSW-C  Palliative Medicine Inpatient   [facility]  [phone]  [facility]   Palliative COPE Line: [phone]

## 2024-05-16 LAB
ANION GAP SERPL CALC-SCNC: 3 MMOL/L (ref 3–18)
BASOPHILS # BLD: 0 K/UL (ref 0–0.1)
BASOPHILS NFR BLD: 0 % (ref 0–2)
BUN SERPL-MCNC: 16 MG/DL (ref 7–18)
BUN/CREAT SERPL: 19 (ref 12–20)
CALCIUM SERPL-MCNC: 8.8 MG/DL (ref 8.5–10.1)
CHLORIDE SERPL-SCNC: 106 MMOL/L (ref 100–111)
CO2 SERPL-SCNC: 28 MMOL/L (ref 21–32)
CREAT SERPL-MCNC: 0.83 MG/DL (ref 0.6–1.3)
DIFFERENTIAL METHOD BLD: ABNORMAL
EOSINOPHIL # BLD: 0.1 K/UL (ref 0–0.4)
EOSINOPHIL NFR BLD: 1 % (ref 0–5)
ERYTHROCYTE [DISTWIDTH] IN BLOOD BY AUTOMATED COUNT: 14.1 % (ref 11.6–14.5)
GLUCOSE BLD STRIP.AUTO-MCNC: 118 MG/DL (ref 70–110)
GLUCOSE BLD STRIP.AUTO-MCNC: 125 MG/DL (ref 70–110)
GLUCOSE BLD STRIP.AUTO-MCNC: 144 MG/DL (ref 70–110)
GLUCOSE BLD STRIP.AUTO-MCNC: 186 MG/DL (ref 70–110)
GLUCOSE SERPL-MCNC: 142 MG/DL (ref 74–99)
HCT VFR BLD AUTO: 41.2 % (ref 36–48)
HGB BLD-MCNC: 13.2 G/DL (ref 13–16)
IMM GRANULOCYTES # BLD AUTO: 0.1 K/UL (ref 0–0.04)
IMM GRANULOCYTES NFR BLD AUTO: 1 % (ref 0–0.5)
LYMPHOCYTES # BLD: 1.5 K/UL (ref 0.9–3.6)
LYMPHOCYTES NFR BLD: 12 % (ref 21–52)
MAGNESIUM SERPL-MCNC: 2.2 MG/DL (ref 1.6–2.6)
MCH RBC QN AUTO: 30.7 PG (ref 24–34)
MCHC RBC AUTO-ENTMCNC: 32 G/DL (ref 31–37)
MCV RBC AUTO: 95.8 FL (ref 78–100)
MONOCYTES # BLD: 0.7 K/UL (ref 0.05–1.2)
MONOCYTES NFR BLD: 6 % (ref 3–10)
NEUTS SEG # BLD: 9.8 K/UL (ref 1.8–8)
NEUTS SEG NFR BLD: 80 % (ref 40–73)
NRBC # BLD: 0 K/UL (ref 0–0.01)
NRBC BLD-RTO: 0 PER 100 WBC
PHOSPHATE SERPL-MCNC: 2.9 MG/DL (ref 2.5–4.9)
PLATELET # BLD AUTO: 317 K/UL (ref 135–420)
PMV BLD AUTO: 10.8 FL (ref 9.2–11.8)
POTASSIUM SERPL-SCNC: 4.3 MMOL/L (ref 3.5–5.5)
RBC # BLD AUTO: 4.3 M/UL (ref 4.35–5.65)
SODIUM SERPL-SCNC: 137 MMOL/L (ref 136–145)
VANCOMYCIN SERPL-MCNC: 13.8 UG/ML (ref 5–40)
VANCOMYCIN SERPL-MCNC: 25.5 UG/ML (ref 5–40)
WBC # BLD AUTO: 12.2 K/UL (ref 4.6–13.2)

## 2024-05-16 PROCEDURE — 6360000002 HC RX W HCPCS: Performed by: INTERNAL MEDICINE

## 2024-05-16 PROCEDURE — 82962 GLUCOSE BLOOD TEST: CPT

## 2024-05-16 PROCEDURE — 85025 COMPLETE CBC W/AUTO DIFF WBC: CPT

## 2024-05-16 PROCEDURE — 6370000000 HC RX 637 (ALT 250 FOR IP): Performed by: NURSE PRACTITIONER

## 2024-05-16 PROCEDURE — 94668 MNPJ CHEST WALL SBSQ: CPT

## 2024-05-16 PROCEDURE — 97535 SELF CARE MNGMENT TRAINING: CPT

## 2024-05-16 PROCEDURE — 6370000000 HC RX 637 (ALT 250 FOR IP): Performed by: EMERGENCY MEDICINE

## 2024-05-16 PROCEDURE — 2000000000 HC ICU R&B

## 2024-05-16 PROCEDURE — 36415 COLL VENOUS BLD VENIPUNCTURE: CPT

## 2024-05-16 PROCEDURE — 2580000003 HC RX 258: Performed by: INTERNAL MEDICINE

## 2024-05-16 PROCEDURE — 83735 ASSAY OF MAGNESIUM: CPT

## 2024-05-16 PROCEDURE — 97530 THERAPEUTIC ACTIVITIES: CPT

## 2024-05-16 PROCEDURE — 80048 BASIC METABOLIC PNL TOTAL CA: CPT

## 2024-05-16 PROCEDURE — 2700000000 HC OXYGEN THERAPY PER DAY

## 2024-05-16 PROCEDURE — 99291 CRITICAL CARE FIRST HOUR: CPT | Performed by: INTERNAL MEDICINE

## 2024-05-16 PROCEDURE — 6370000000 HC RX 637 (ALT 250 FOR IP): Performed by: INTERNAL MEDICINE

## 2024-05-16 PROCEDURE — 92526 ORAL FUNCTION THERAPY: CPT

## 2024-05-16 PROCEDURE — 84100 ASSAY OF PHOSPHORUS: CPT

## 2024-05-16 PROCEDURE — 94640 AIRWAY INHALATION TREATMENT: CPT

## 2024-05-16 PROCEDURE — 80202 ASSAY OF VANCOMYCIN: CPT

## 2024-05-16 PROCEDURE — 94761 N-INVAS EAR/PLS OXIMETRY MLT: CPT

## 2024-05-16 PROCEDURE — 6360000002 HC RX W HCPCS: Performed by: NURSE PRACTITIONER

## 2024-05-16 PROCEDURE — APPSS30 APP SPLIT SHARED TIME 16-30 MINUTES

## 2024-05-16 RX ORDER — VANCOMYCIN 1.75 G/350ML
1250 INJECTION, SOLUTION INTRAVENOUS EVERY 12 HOURS
Status: DISCONTINUED | OUTPATIENT
Start: 2024-05-16 | End: 2024-05-18 | Stop reason: DRUGHIGH

## 2024-05-16 RX ORDER — ALBUTEROL SULFATE 2.5 MG/3ML
2.5 SOLUTION RESPIRATORY (INHALATION) EVERY 6 HOURS PRN
Status: DISCONTINUED | OUTPATIENT
Start: 2024-05-16 | End: 2024-05-17

## 2024-05-16 RX ORDER — SODIUM CHLORIDE FOR INHALATION 3 %
4 VIAL, NEBULIZER (ML) INHALATION
Status: DISCONTINUED | OUTPATIENT
Start: 2024-05-16 | End: 2024-05-17

## 2024-05-16 RX ADMIN — ALBUTEROL SULFATE 2.5 MG: 2.5 SOLUTION RESPIRATORY (INHALATION) at 00:36

## 2024-05-16 RX ADMIN — DOCUSATE SODIUM LIQUID 100 MG: 100 LIQUID ORAL at 09:17

## 2024-05-16 RX ADMIN — SODIUM CHLORIDE SOLN NEBU 3% 4 ML: 3 NEBU SOLN at 04:16

## 2024-05-16 RX ADMIN — VANCOMYCIN 1250 MG: 1.75 INJECTION, SOLUTION INTRAVENOUS at 14:27

## 2024-05-16 RX ADMIN — BUDESONIDE 1 MG: 1 SUSPENSION RESPIRATORY (INHALATION) at 19:10

## 2024-05-16 RX ADMIN — ALBUTEROL SULFATE 2.5 MG: 2.5 SOLUTION RESPIRATORY (INHALATION) at 04:16

## 2024-05-16 RX ADMIN — THIAMINE HYDROCHLORIDE 100 MG: 100 INJECTION, SOLUTION INTRAMUSCULAR; INTRAVENOUS at 09:17

## 2024-05-16 RX ADMIN — BUDESONIDE 1 MG: 1 SUSPENSION RESPIRATORY (INHALATION) at 08:28

## 2024-05-16 RX ADMIN — SODIUM CHLORIDE SOLN NEBU 3% 4 ML: 3 NEBU SOLN at 19:10

## 2024-05-16 RX ADMIN — HEPARIN SODIUM 5000 UNITS: 5000 INJECTION INTRAVENOUS; SUBCUTANEOUS at 06:16

## 2024-05-16 RX ADMIN — HEPARIN SODIUM 5000 UNITS: 5000 INJECTION INTRAVENOUS; SUBCUTANEOUS at 21:44

## 2024-05-16 RX ADMIN — Medication 15 ML: at 09:17

## 2024-05-16 RX ADMIN — CHLORHEXIDINE GLUCONATE 15 ML: 1.2 RINSE ORAL at 09:17

## 2024-05-16 RX ADMIN — DOCUSATE SODIUM LIQUID 100 MG: 100 LIQUID ORAL at 21:44

## 2024-05-16 RX ADMIN — HEPARIN SODIUM 5000 UNITS: 5000 INJECTION INTRAVENOUS; SUBCUTANEOUS at 14:27

## 2024-05-16 RX ADMIN — SODIUM CHLORIDE SOLN NEBU 3% 4 ML: 3 NEBU SOLN at 00:36

## 2024-05-16 RX ADMIN — CHLORHEXIDINE GLUCONATE 15 ML: 1.2 RINSE ORAL at 20:50

## 2024-05-16 ASSESSMENT — PAIN SCALES - GENERAL
PAINLEVEL_OUTOF10: 0

## 2024-05-16 ASSESSMENT — PAIN SCALES - WONG BAKER
WONGBAKER_NUMERICALRESPONSE: NO HURT

## 2024-05-16 NOTE — PROGRESS NOTES
Rox AVILA and , RN have reviewed the chart and verify that the restraint order matches that of the restraint documentation, the order is not , and documentation is complete per the type of restraints implemented based on policy and free from omissions.

## 2024-05-16 NOTE — PROGRESS NOTES
[facility]   Pharmacy Pharmacokinetic Monitoring Service - Vancomycin    Indication: sepsis  Goal AUC/TWILA: 400-600  Day of Therapy: 11  Additional Antimicrobials: none    Pertinent Laboratory Values:   Wt Readings from Last 1 Encounters:   05/14/24 75.2 kg (165 lb 12.6 oz)     Temp Readings from Last 1 Encounters:   05/16/24 99 °F (37.2 °C)     Estimated Creatinine Clearance: 70 mL/min (based on SCr of 0.83 mg/dL).    Recent Labs     05/15/24  0500 05/16/24  0229   CREATININE 0.83 0.83   BUN 15 16   WBC 11.6 12.2     Pertinent Cultures:  Date Source Results   5/6 blood CoNS in 1/2 5/7 ET aspirate MRSA, yeast   5/7 ET aspirate MRSA, yeast   5/8 blood NGTD   5/8 BAL Light vikas   5/10 BAL MRSA, Kleb   MRSA Nasal Swab: present    Assessment:  Date Current Dose Level (mg/L) Timing of Level (h) AUC/TWILA   5/6 1,750 mg x1 - - -   5/7 - - - -   5/8 1,500 mg x1 5.4 32 NA   5/9 1,250 mg x1 10.9 17 NA   5/10 1,500 mg x1 15.6 15 NA   5/11 1,250 mg q18h 12.4 16 532   5/12 1,250 mg q18h - - -   5/14 1,250mg q18h      5/15 1,250mg q18h      5/16 1,250mg q18h  1,250mg q12h 13.8 10.5 372      Plan:  Vancomycin 1,250mg q12h  Projected AUCss/T = 551/15  Repeat vancomycin level to be repeated as indicated  Pharmacy will continue to monitor patient and adjust therapy as indicated      FRANNY RUTHERFORD RPH  5/16/2024

## 2024-05-16 NOTE — PLAN OF CARE
Problem: Physical Therapy - Adult  Goal: By Discharge: Performs mobility at highest level of function for planned discharge setting.  See evaluation for individualized goals.  Description: Physical Therapy Goals  Initiated 5/13/2024 and to be accomplished within 7 day(s)  1.  Patient will follow 75% of commands.   2.  Patient will roll side to side in bed with moderate assistance.    3.  Patient will move from supine to sit and sit to supine  in bed with moderate assistance.    4.  Patient will transfer from bed to chair and chair to bed with moderate assistance  using the least restrictive device.  5.  Patient will demonstrate seated balance at edge of bed for 8 minutes with supervision.   6.  Patient will perform sit to stand with moderate assistance.  7.  Patient will ambulate with moderate assistance for 10 feet with the least restrictive device.     PLOF: Lives with daughter. First floor set-up of two story home. 2 steps to enter. Amb with cane. [facility]ends [facility] Mon-Fri.   Outcome: Progressing   PHYSICAL THERAPY TREATMENT    Patient: Paul Bobby (83 y.o. male)  Date: 5/16/2024  Diagnosis: Acute respiratory failure (HCC) [J96.00]  Altered mental status, unspecified altered mental status type [R41.82]  Acute hypoxemic respiratory failure (HCC) [J96.01]  AMS (altered mental status) [R41.82] AMS (altered mental status)    Precautions: General Precautions, Fall Risk  ASSESSMENT:  Pt opened eyes upon arrival. Restraint to R UE in place. Performed PROM and stretching to BLE. Max A x2 rolling, pt able to  guard rail with R UE. Pt performed spontaneous AROM with R LE partial range. Pt left with bed in chair position to encourage LE movement. Pt follows 10% of commands.     Educated on need for RN assistance with mobility. Call bell in reach.     Progression toward goals:   []      Improving appropriately and progressing toward goals  [x]      Improving slowly and progressing toward goals  []      Not making

## 2024-05-16 NOTE — PROGRESS NOTES
Attempted volara with patient. However patient would not open mouth /cooperate for therapy. He also would likely not tolerate holding mask to face and it would be difficult for tight seal with NG/hfnc in place so RT did vest therapy for 10 min instead. Patient tolerated therapy well   Post therapy patient had nonproductive cough       05/16/24 1527   Treatment   $Bronchial Hygiene $Subsequent P&PD  (vest therapy)   Pre-Tx Pulse 86   Pre-Tx Resps 26   Breath Sounds Pre-Tx BEBETO Diminished   Breath Sounds Pre-Tx LLL Diminished   Breath Sounds Pre-Tx RUL Diminished   Breath Sounds Pre-Tx RML Diminished   Breath Sounds Pre-Tx RLL Diminished   Breath Sounds Post-Tx BEBETO Diminished   Breath Sounds Post-Tx LLL Diminished   Breath Sounds Post-Tx RUL Diminished   Breath Sounds Post-Tx RML Diminished   Breath Sounds Post-Tx RLL Diminished   Post-Tx Pulse 88   Post-Tx Resps 25   Position Semi-Araujo's   Treatment Tolerance Well

## 2024-05-16 NOTE — PROGRESS NOTES
Vest therapy completed with patient for 10 min. Patient tolerated well with no complications   05/16/24 1155   Treatment   $Bronchial Hygiene $Subsequent P&PD  (vest)   Pre-Tx Pulse 86   Pre-Tx Resps 25   Breath Sounds Pre-Tx BEBETO Diminished   Breath Sounds Pre-Tx LLL Diminished   Breath Sounds Pre-Tx RUL Diminished   Breath Sounds Pre-Tx RML Diminished   Breath Sounds Pre-Tx RLL Diminished   Breath Sounds Post-Tx BEBETO Diminished   Breath Sounds Post-Tx LLL Diminished   Breath Sounds Post-Tx RUL Diminished   Breath Sounds Post-Tx RML Diminished   Breath Sounds Post-Tx RLL Diminished   Post-Tx Pulse 88   Post-Tx Resps 22   Is patient on O2? Y   Oxygen Therapy/Pulse Ox   O2 Therapy Oxygen humidified   O2 Device Heated high flow cannula   O2 Flow Rate (L/min) 40 L/min   FiO2  45 %   Pulse 83   Respirations 22   SpO2 93 %

## 2024-05-16 NOTE — PLAN OF CARE
Problem: Occupational Therapy - Adult  Goal: By Discharge: Performs self-care activities at highest level of function for planned discharge setting.  See evaluation for individualized goals.  Description: Occupational Therapy Goals:  Initiated 5/14/2024 to be met within 7-10 days.    1.  Patient will perform grooming with minimal assistance/contact guard assist.   2.  Patient will perform upper body dressing with minimal assistance/contact guard assist.  3.  Patient will perform bed mobility with minimal assistance in preparation for seated ADL tasks.   4.  Patient will perform toilet transfers with minimal assistance/contact guard assist.  5.  Patient will perform all aspects of toileting with minimal assistance/contact guard assist.  6.  Patient will participate in upper extremity therapeutic exercise/activities with minimal assistance/contact guard assist for 8-10 minutes to increase strength/endurance for ADLs.    7.  Patient will utilize energy conservation techniques during functional activities with verbal cues.    PLOF: PLOF obtained from chart review. Pt lives w/ family in a two level house, able to live on main level, walk in shower. Pt has cane. Unsure how much assistance pt needed w/ ADLs and mobility.     Outcome: Progressing  OCCUPATIONAL THERAPY TREATMENT    Patient: Paul Bobby (83 y.o. male)  Date: 5/16/2024  Diagnosis: Acute respiratory failure (HCC) [J96.00]  Altered mental status, unspecified altered mental status type [R41.82]  Acute hypoxemic respiratory failure (HCC) [J96.01]  AMS (altered mental status) [R41.82] AMS (altered mental status)      Precautions: General Precautions, Fall Risk    Chart, occupational therapy assessment, plan of care, and goals were reviewed.  ASSESSMENT:    Pt is drowsy this am, required moderate cues to wake up and attend to OT session. RUE restraint was doffed in the beginning and re-applied at the end of the session. Pt required Mod-Max verbal and tactile  cues to participate in all tasks. Max A to wash hands, however, pt noted spontaneously holding both hands at midline with interlaced fingers. Pt found to be soiled with urine, Max Ax2 for bed mobility and toileting hygiene. Pt positioned for comfort at the end of the session, all needs met.  Progression toward goals:  []          Improving appropriately and progressing toward goals  [x]          Improving slowly and progressing toward goals  []          Not making progress toward goals and plan of care will be adjusted     PLAN:  Patient continues to benefit from skilled intervention to address the above impairments.  Continue treatment per established plan of care.    Further Equipment Recommendations for Discharge: hospital bed    AMPAC: AM-PAC Inpatient Daily Activity Raw Score: 10  Current research shows that an AM-PAC score of 17 or less is not associated with a discharge to the patient's home setting. Based on an AM-PAC score and their current ADL deficits; it is recommended that the patient have 3-5 sessions per week of Occupational Therapy at d/c to increase the patient's independence.      This AMPAC score should be considered in conjunction with interdisciplinary team recommendations to determine the most appropriate discharge setting. Patient's social support, diagnosis, medical stability, and prior level of function should also be taken into consideration.     SUBJECTIVE:   Patient stated, \"Hi.\"    OBJECTIVE DATA SUMMARY:   Cognitive/Behavioral Status:  Orientation  Overall Orientation Status: Impaired  Orientation Level: Unable to assess  Cognition  Overall Cognitive Status: Exceptions  Arousal/Alertness: Delayed responses to stimuli;Inconsistent responses to stimuli  Following Commands: Inconsistently follows commands  Attention Span: Difficulty attending to directions  Initiation: Requires cues for all  Sequencing: Requires cues for all    Functional Mobility and Transfers for ADLs:   Bed

## 2024-05-16 NOTE — PLAN OF CARE
Problem: SLP Adult - Impaired Swallowing  Goal: By Discharge: Advance to least restrictive diet without signs or symptoms of aspiration for planned discharge setting.  See evaluation for individualized goals.  Description: Patient will:  1. Tolerate PO trials with 0 s/s overt distress in 4/5 trials  2. Utilize compensatory swallow strategies/maneuvers (decrease bite/sip, size/rate, alt. liq/sol) with min cues in 4/5 trials  3. Perform oral-motor/laryngeal exercises to increase oropharyngeal swallow function with min cues  4. Complete an objective swallow study (i.e., MBSS) to assess swallow integrity, r/o aspiration, and determine of safest LRD, min A    Recommend:   NPO with alternate means of nutrition/hydration vs comfort feeds   Strict aspiration precautions (HOB >30 degrees at all times, Oral care TID)    Outcome: Not Progressing  SPEECH LANGUAGE PATHOLOGY DYSPHAGIA TREATMENT    Patient: Paul Bobby (83 y.o. male)  Date: 5/16/2024  Diagnosis: Acute respiratory failure (HCC) [J96.00]  Altered mental status, unspecified altered mental status type [R41.82]  Acute hypoxemic respiratory failure (HCC) [J96.01]  AMS (altered mental status) [R41.82] AMS (altered mental status)      Precautions: Standard, aspiration  PLOF: As per H&P      ASSESSMENT:  Pt seen for follow up dysphagia management. Pt will open eyes with tactile/verbal. Pt seen with dobhoff in place on 40L 45% FiOc HFNC upon SLP arrival. Pt nonverbal on this date and able to follow minimal commands. Oral care completed with toothette and suctioning prior to PO trials. Pt with immediate congested cough s/p ice chip trial x2. Weak/delayed laryngeal elevation/excursion to palpation. Pt continues to be at a high risk of aspiration, malnutrition, and dehydration with PO. Recommend continuation of NPO with dobhoff, strict aspiration precautions, oral care with suctioning, and meds via dobhoff. St will continue to follow.      Progression toward goals:  []    Method(s): Observation, Palpation  Patient Position: 55 at HOB  Vocal Quality: Aphonic  Consistency Presented: Moderately Thick, Ice Chips  How Presented: SLP-fed/Presented, Spoon  Bolus Acceptance: No impairment  Bolus Formation/Control: Impaired  Type of Impairment: Delayed, Mastication, Incomplete, Poor  Propulsion: Delayed (# of seconds), Discoordination  Oral Residue: None  Initiation of Swallow: Delayed (# of seconds)  Laryngeal Elevation: Weak, Decreased  Aspiration Signs/Symptoms: Watery eyes, Increase in RR, Decrease in O2 saturations  Pharyngeal Phase Characteristics: Suspected pharyngeal residue, Easily fatigued, Poor endurance  Cues for Modifications: Moderate            DYSPHAGIA DIAGNOSIS: Dysphagia Diagnosis: Severe pharyngeal stage dysphagia, Moderate oral stage dysphagia    PAIN:  Start of Tx: 0, appears to be resting comfortably  End of Tx: 0, appears to be resting comfortably     After treatment:   []              Patient left in no apparent distress sitting up in chair  [x]              Patient left in no apparent distress in bed  [x]              Call bell left within reach  [x]              Nursing notified  []              Family present  []              Caregiver present  []              Bed alarm activated      COMMUNICATION/EDUCATION:   [x]            Aspiration precautions; swallow safety; compensatory techniques provided via demonstration, verbalization and teach back of comprehension  []         Patient/family have participated as able in goal setting and plan of care.  []            Patient/family agree to work toward stated goals and plan of care.  []            Patient understands intent and goals of therapy, neutral about participation.  [x]            Patient unable to participate in goal setting/plan of care secondary to cognition, hearing/vision deficits; education ongoing with interdisciplinary staff   []            Handout regarding diet recommendations and thickener

## 2024-05-16 NOTE — PROGRESS NOTES
attended the interdisciplinary rounds for Paul Bobby, who is a 83 y.o.,male. Patient's Primary Language is: English.   According to the patient's EMR Yazidi Affiliation is: [employer] day [employer]ism.     The reason the Patient came to the hospital is:   Patient Active Problem List    Diagnosis Date Noted    Parainfluenza 05/15/2024    Chronic obstructive pulmonary disease (HCC) 05/15/2024    Encounter for palliative care 05/15/2024    Palliative care encounter 05/13/2024    Goals of care, counseling/discussion 05/13/2024    DNR (do not resuscitate) discussion 05/13/2024    Aspiration into airway 05/13/2024    ESTRELLA (acute kidney injury) ([facility], [facility]) 05/08/2024    Septic shock (HCC) 05/08/2024    Lactic acidosis 05/08/2024    Multifocal pneumonia 05/08/2024    Metabolic acidosis 05/08/2024    Aspiration pneumonia of both lungs due to vomit (HCC) 05/07/2024    Aspiration of food 05/07/2024    Mucus plugging of bronchi 05/07/2024    Acute encephalopathy 05/07/2024    Acute hypoxemic respiratory failure (HCC) 05/07/2024    AMS (altered mental status) 05/06/2024    Acute respiratory failure (HCC) 05/06/2024    Dementia (HCC) 11/04/2022    Syncope 11/01/2022    Weakness 10/31/2022          Plan:   participated and listen to the recommendations of the IDR team.    Patient candidate for stepdown.     Chaplains will continue to follow and will provide pastoral care on an as needed/requested basis.     recommends bedside caregivers page    on duty if patient shows signs of acute spiritual or emotional distress.     [facility]  Staff   Spiritual Health   [phone]

## 2024-05-16 NOTE — INTERDISCIPLINARY ROUNDS
[facility]  Pulmonary, Critical Care, and Sleep Medicine  Interdisciplinary and Ventilator Weaning Rounds    Patient discussed in morning walking rounds and interdisciplinary rounds.    ICU admission day: 5/7    Overnight events:   No acute events overnight    Assessments and best practice:  Ventilator  Intubated 5/6, extubated 5/12 . HFNC 40L/50%   Glycemic control  Diet  Diet NPO  ADULT TUBE FEEDING; Nasogastric; Diabetic; Continuous; 30; Yes; 10; Q 6 hours; 65; 50; Q 4 hours  Stress ulcer prophylaxis.  Not indicated   DVT prophylaxis.  SQH  Need for Lines, trejo assessed.  Yes  Restraint Reevaluation   Not needed   Disposition regarding transferring out of the ICU  yellow      Family contact/MPOA:   Family updated by ICU team    Palliative consult within 3 days of admission to ICU-  Ethics Guidance: 21 days      Daily Plans:  Cont aggressive pulmonary hygiene, CPT   Wean O2 as tolerated   Downgrade to stepdown         ISAIAH time 8 minutes        SAMIR GALEANO PA-C  05/16/24  Pulmonary, Critical Care Medicine  [facility]

## 2024-05-16 NOTE — PROGRESS NOTES
[facility]  Progress Note    Patient: Paul Bobby Age: 83 y.o. : 1940 MR#: [id number] SSN: [id number]  Date: 2024         Assessment/Plan:     Acute Hypoxic Respiratory Failure- requiring mechanical ventilator, in the setting aspiration PNA and parainfluenza 3, extubated  to HFNC   Appreciate pulm assistance  Wean O2 as tolerated  CPT, saline nebs, metaneb per pulm  Aspiration PNA; resp culture 5/10 +MRSA, klebsiella aerogenes  Aspiration precautions ordered  NPO per SLP, receiving TF through dobhoff which he has tried to pull out on a few occasions. Not a good candidate for PEG.   IV abx: vanc. Completed zosyn   Parainfluenza 3   Acute Encephalopathy-toxic/metabolic in nature superimposed on dementia, improving   Metabolic acidosis, lactic acidosis, resolved   ESTRELLA- prerenal azotemia vs. Ishemic ATN, resolved   Septic shock vs. hypovolemic- in the setting of multifocal PNA with partial b/l LL collapse and consildation/Aspiration PNA. Shock resolved  Chronic bronchitis and emphysema: with distal mucoid impaction, s/p multiple bronchs. Not in acute exacerbation   Moderate colonic stool burden  Colace, PRN miralax  Cholelithiasis: no evidence of acute cholecystitis   Hx DM II   Hx HLD   Dementia  Palliative care following  Fall precautions        DVT Prophylaxis:  []Lovenox  [x]Hep SQ  []SCDs  []Coumadin   []On Heparin gtt []PO anticoagulant    Anticipated discharge: >2 days    Subjective:     Pt s/e @ bedside. No major events overnight. Pt still altered, but awakens easily and can answer simple questions    Hospital Course:     Patient is a 83 y.o. male with a PMHx of dementia, DM II, HLD, and COPD who presented to [facility] ER with encephalopathy and hypoxia. Per EMS, patient PACE and was evaluated by the nurse practitioner who found him altered and vomiting. She was concerned he aspirated on his lunch and called EMS. On arrival to ER, he was on NRB  1,250 mg IntraVENous Q12H    sodium chloride (Inhalant) 3 % nebulizer solution 4 mL  4 mL Nebulization BID RT    And    albuterol (PROVENTIL) (2.5 MG/3ML) 0.083% nebulizer solution 2.5 mg  2.5 mg Nebulization Q6H PRN    CENTRUM/CERTA-EZ with minerals oral solution 15 mL  15 mL Oral Daily    polyethylene glycol (GLYCOLAX) packet 17 g  17 g Per NG tube Daily PRN    budesonide (PULMICORT) nebulizer suspension 1 mg  1 mg Nebulization BID RT    chlorhexidine (PERIDEX) 0.12 % solution 15 mL  15 mL Mouth/Throat BID    ondansetron (ZOFRAN-ODT) disintegrating tablet 4 mg  4 mg Oral Q8H PRN    Or    ondansetron (ZOFRAN) injection 4 mg  4 mg IntraVENous Q6H PRN    acetaminophen (TYLENOL) tablet 650 mg  650 mg Oral Q6H PRN    Or    acetaminophen (TYLENOL) suppository 650 mg  650 mg Rectal Q6H PRN    heparin (porcine) injection 5,000 Units  5,000 Units SubCUTAneous 3 times per day    ipratropium 0.5 mg-albuterol 2.5 mg (DUONEB) nebulizer solution 1 Dose  1 Dose Inhalation Q4H PRN    thiamine (B-1) injection 100 mg  100 mg IntraVENous Daily    docusate (COLACE) 50 MG/5ML liquid 100 mg  100 mg Oral BID        Labs:    Recent Results (from the past 24 hour(s))   Basic Metabolic Panel w/ Reflex to MG    Collection Time: 05/16/24  2:29 AM   Result Value Ref Range    Sodium 137 136 - 145 mmol/L    Potassium 4.3 3.5 - 5.5 mmol/L    Chloride 106 100 - 111 mmol/L    CO2 28 21 - 32 mmol/L    Anion Gap 3 3.0 - 18 mmol/L    Glucose 142 (H) 74 - 99 mg/dL    BUN 16 7.0 - 18 MG/DL    Creatinine 0.83 0.6 - 1.3 MG/DL    BUN/Creatinine Ratio 19 12 - 20      Est, Glom Filt Rate 87 >60 ml/min/1.73m2    Calcium 8.8 8.5 - 10.1 MG/DL   Phosphorus    Collection Time: 05/16/24  2:29 AM   Result Value Ref Range    Phosphorus 2.9 2.5 - 4.9 MG/DL   Magnesium    Collection Time: 05/16/24  2:29 AM   Result Value Ref Range    Magnesium 2.2 1.6 - 2.6 mg/dL   CBC with Auto Differential    Collection Time: 05/16/24  2:29 AM   Result Value Ref Range    WBC 12.2

## 2024-05-16 NOTE — PROGRESS NOTES
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 Correlate with clinical,epidemiologic, and other laboratory findings           Comment: Please see BCID Interpretation Guide in EPIC Links                    Imaging:  [x]   I have personally reviewed the patient’s radiographs and reports  Most recent imaging:  CT ABDOMEN PELVIS W IV CONTRAST Additional Contrast? None    Result Date: 5/6/2024  1.  No acute infectious or inflammatory process in the abdomen or pelvis. No acute findings. 2.  Moderate colonic stool burden with large rectal stool ball. Recommend correlation for constipation. 3.  Abdominal wall hernia containing short segment of small bowel without obstruction. 4.  Cholelithiasis without evidence of acute cholecystitis.    CTA CHEST W WO CONTRAST PE Eval    Result Date: 5/6/2024  1.  No pulmonary embolism. 2.  Extensive multifocal pneumonia with partial bilateral lower lobe collapse/consolidation. 3.  Extensive underlying emphysema. 4.  Sequela of chronic bronchitis with distal mucoid impaction.     CT Head W/O Contrast    Result Date: 5/6/2024  No acute intracranial findings. Further evaluation with brain MRI is recommended if there is concern for hypoxic ischemic injury.       No results found for this or any previous visit.     No valid procedures specified.     IMPRESSION:   Acute Hypoxic Respiratory Failure- requiring mechanical ventilator, in the setting multifocal PNA and parainfluenza 3, extubated 5/12 to HFNC   Aspiration PNA  Parainfluenza 3   Acute Encephalopathy-toxic/metabolic in nature superimposed on dementia, resolved back to baseline   Metabolic acidosis, lactic acidosis, resolved   ESTRELLA- prerenal azotemia vs. Ishemic ATN, resolved   Septic shock vs. hypovolemic- in the setting of multifocal PNA with partial b/l LL collapse and consildation/Aspiration PNA. Shock resolved  Chronic bronchitis and  , free T4 1.4   Metabolic:  Daily BMP, mag, phos. Trend lytes, replace as needed.   Musc/Skin: no acute issues, wound care  Full code No additional code details  Discussed in interdisciplinary rounds  Palliative care team following      Best practice :    Glycemic control  IHI ICU bundles:   Stress ulcer prophylaxis.   Not indicated  DVT prophylaxis.   SQH  Need for Lines, trejo assessed.  Palliative care evaluation.  Restraints not needed.         ISAIAH time 17 minutes    SAMIR GALEANO PA-C  05/16/24  Pulmonary, Critical Care Medicine  [facility]

## 2024-05-17 ENCOUNTER — APPOINTMENT (OUTPATIENT)
Facility: HOSPITAL | Age: 84
End: 2024-05-17
Payer: MEDICARE

## 2024-05-17 LAB
ANION GAP SERPL CALC-SCNC: 6 MMOL/L (ref 3–18)
BASOPHILS # BLD: 0 K/UL (ref 0–0.1)
BASOPHILS NFR BLD: 0 % (ref 0–2)
BUN SERPL-MCNC: 19 MG/DL (ref 7–18)
BUN/CREAT SERPL: 26 (ref 12–20)
CALCIUM SERPL-MCNC: 8.4 MG/DL (ref 8.5–10.1)
CHLORIDE SERPL-SCNC: 108 MMOL/L (ref 100–111)
CO2 SERPL-SCNC: 25 MMOL/L (ref 21–32)
CREAT SERPL-MCNC: 0.73 MG/DL (ref 0.6–1.3)
DIFFERENTIAL METHOD BLD: ABNORMAL
EOSINOPHIL # BLD: 0.1 K/UL (ref 0–0.4)
EOSINOPHIL NFR BLD: 1 % (ref 0–5)
ERYTHROCYTE [DISTWIDTH] IN BLOOD BY AUTOMATED COUNT: 14 % (ref 11.6–14.5)
GLUCOSE BLD STRIP.AUTO-MCNC: 115 MG/DL (ref 70–110)
GLUCOSE BLD STRIP.AUTO-MCNC: 133 MG/DL (ref 70–110)
GLUCOSE SERPL-MCNC: 168 MG/DL (ref 74–99)
HCT VFR BLD AUTO: 35.1 % (ref 36–48)
HGB BLD-MCNC: 11.5 G/DL (ref 13–16)
IMM GRANULOCYTES # BLD AUTO: 0 K/UL (ref 0–0.04)
IMM GRANULOCYTES NFR BLD AUTO: 0 % (ref 0–0.5)
LYMPHOCYTES # BLD: 1.3 K/UL (ref 0.9–3.6)
LYMPHOCYTES NFR BLD: 13 % (ref 21–52)
MAGNESIUM SERPL-MCNC: 2.2 MG/DL (ref 1.6–2.6)
MCH RBC QN AUTO: 30.6 PG (ref 24–34)
MCHC RBC AUTO-ENTMCNC: 32.8 G/DL (ref 31–37)
MCV RBC AUTO: 93.4 FL (ref 78–100)
MONOCYTES # BLD: 0.7 K/UL (ref 0.05–1.2)
MONOCYTES NFR BLD: 7 % (ref 3–10)
NEUTS SEG # BLD: 7.9 K/UL (ref 1.8–8)
NEUTS SEG NFR BLD: 79 % (ref 40–73)
NRBC # BLD: 0 K/UL (ref 0–0.01)
NRBC BLD-RTO: 0 PER 100 WBC
PHOSPHATE SERPL-MCNC: 3.1 MG/DL (ref 2.5–4.9)
PLATELET # BLD AUTO: 312 K/UL (ref 135–420)
PMV BLD AUTO: 9.9 FL (ref 9.2–11.8)
POTASSIUM SERPL-SCNC: 4.1 MMOL/L (ref 3.5–5.5)
RBC # BLD AUTO: 3.76 M/UL (ref 4.35–5.65)
SODIUM SERPL-SCNC: 139 MMOL/L (ref 136–145)
WBC # BLD AUTO: 10.1 K/UL (ref 4.6–13.2)

## 2024-05-17 PROCEDURE — 83735 ASSAY OF MAGNESIUM: CPT

## 2024-05-17 PROCEDURE — 6360000002 HC RX W HCPCS: Performed by: NURSE PRACTITIONER

## 2024-05-17 PROCEDURE — 2000000000 HC ICU R&B

## 2024-05-17 PROCEDURE — 94668 MNPJ CHEST WALL SBSQ: CPT

## 2024-05-17 PROCEDURE — 94761 N-INVAS EAR/PLS OXIMETRY MLT: CPT

## 2024-05-17 PROCEDURE — 2700000000 HC OXYGEN THERAPY PER DAY

## 2024-05-17 PROCEDURE — 6370000000 HC RX 637 (ALT 250 FOR IP): Performed by: EMERGENCY MEDICINE

## 2024-05-17 PROCEDURE — 6360000002 HC RX W HCPCS: Performed by: INTERNAL MEDICINE

## 2024-05-17 PROCEDURE — 99232 SBSQ HOSP IP/OBS MODERATE 35: CPT | Performed by: STUDENT IN AN ORGANIZED HEALTH CARE EDUCATION/TRAINING PROGRAM

## 2024-05-17 PROCEDURE — 6370000000 HC RX 637 (ALT 250 FOR IP): Performed by: NURSE PRACTITIONER

## 2024-05-17 PROCEDURE — 71045 X-RAY EXAM CHEST 1 VIEW: CPT

## 2024-05-17 PROCEDURE — 80048 BASIC METABOLIC PNL TOTAL CA: CPT

## 2024-05-17 PROCEDURE — 94664 DEMO&/EVAL PT USE INHALER: CPT

## 2024-05-17 PROCEDURE — APPSS15 APP SPLIT SHARED TIME 0-15 MINUTES

## 2024-05-17 PROCEDURE — 82962 GLUCOSE BLOOD TEST: CPT

## 2024-05-17 PROCEDURE — 84100 ASSAY OF PHOSPHORUS: CPT

## 2024-05-17 PROCEDURE — 94669 MECHANICAL CHEST WALL OSCILL: CPT

## 2024-05-17 PROCEDURE — 2580000003 HC RX 258: Performed by: INTERNAL MEDICINE

## 2024-05-17 PROCEDURE — 94640 AIRWAY INHALATION TREATMENT: CPT

## 2024-05-17 PROCEDURE — 6370000000 HC RX 637 (ALT 250 FOR IP): Performed by: INTERNAL MEDICINE

## 2024-05-17 PROCEDURE — 36415 COLL VENOUS BLD VENIPUNCTURE: CPT

## 2024-05-17 PROCEDURE — 99232 SBSQ HOSP IP/OBS MODERATE 35: CPT

## 2024-05-17 PROCEDURE — 85025 COMPLETE CBC W/AUTO DIFF WBC: CPT

## 2024-05-17 PROCEDURE — 97535 SELF CARE MNGMENT TRAINING: CPT

## 2024-05-17 RX ORDER — ALBUTEROL SULFATE 2.5 MG/3ML
2.5 SOLUTION RESPIRATORY (INHALATION)
Status: DISCONTINUED | OUTPATIENT
Start: 2024-05-17 | End: 2024-05-18

## 2024-05-17 RX ORDER — SODIUM CHLORIDE FOR INHALATION 3 %
4 VIAL, NEBULIZER (ML) INHALATION
Status: DISCONTINUED | OUTPATIENT
Start: 2024-05-17 | End: 2024-05-18

## 2024-05-17 RX ADMIN — HEPARIN SODIUM 5000 UNITS: 5000 INJECTION INTRAVENOUS; SUBCUTANEOUS at 14:28

## 2024-05-17 RX ADMIN — HEPARIN SODIUM 5000 UNITS: 5000 INJECTION INTRAVENOUS; SUBCUTANEOUS at 21:38

## 2024-05-17 RX ADMIN — THIAMINE HYDROCHLORIDE 100 MG: 100 INJECTION, SOLUTION INTRAMUSCULAR; INTRAVENOUS at 09:29

## 2024-05-17 RX ADMIN — SODIUM CHLORIDE SOLN NEBU 3% 4 ML: 3 NEBU SOLN at 12:00

## 2024-05-17 RX ADMIN — DOCUSATE SODIUM LIQUID 100 MG: 100 LIQUID ORAL at 09:29

## 2024-05-17 RX ADMIN — DOCUSATE SODIUM LIQUID 100 MG: 100 LIQUID ORAL at 21:37

## 2024-05-17 RX ADMIN — ALBUTEROL SULFATE 2.5 MG: 2.5 SOLUTION RESPIRATORY (INHALATION) at 20:09

## 2024-05-17 RX ADMIN — ALBUTEROL SULFATE 2.5 MG: 2.5 SOLUTION RESPIRATORY (INHALATION) at 12:00

## 2024-05-17 RX ADMIN — HEPARIN SODIUM 5000 UNITS: 5000 INJECTION INTRAVENOUS; SUBCUTANEOUS at 06:28

## 2024-05-17 RX ADMIN — BUDESONIDE 1 MG: 1 SUSPENSION RESPIRATORY (INHALATION) at 20:09

## 2024-05-17 RX ADMIN — CHLORHEXIDINE GLUCONATE 15 ML: 1.2 RINSE ORAL at 21:38

## 2024-05-17 RX ADMIN — CHLORHEXIDINE GLUCONATE 15 ML: 1.2 RINSE ORAL at 09:29

## 2024-05-17 RX ADMIN — VANCOMYCIN 1250 MG: 1.75 INJECTION, SOLUTION INTRAVENOUS at 14:30

## 2024-05-17 RX ADMIN — SODIUM CHLORIDE SOLN NEBU 3% 4 ML: 3 NEBU SOLN at 20:09

## 2024-05-17 RX ADMIN — VANCOMYCIN 1250 MG: 1.75 INJECTION, SOLUTION INTRAVENOUS at 01:39

## 2024-05-17 RX ADMIN — SODIUM CHLORIDE SOLN NEBU 3% 4 ML: 3 NEBU SOLN at 16:12

## 2024-05-17 RX ADMIN — BUDESONIDE 1 MG: 1 SUSPENSION RESPIRATORY (INHALATION) at 08:05

## 2024-05-17 RX ADMIN — ALBUTEROL SULFATE 2.5 MG: 2.5 SOLUTION RESPIRATORY (INHALATION) at 08:05

## 2024-05-17 RX ADMIN — ALBUTEROL SULFATE 2.5 MG: 2.5 SOLUTION RESPIRATORY (INHALATION) at 16:12

## 2024-05-17 RX ADMIN — Medication 15 ML: at 09:29

## 2024-05-17 RX ADMIN — SODIUM CHLORIDE SOLN NEBU 3% 4 ML: 3 NEBU SOLN at 08:05

## 2024-05-17 ASSESSMENT — PAIN SCALES - WONG BAKER
WONGBAKER_NUMERICALRESPONSE: NO HURT

## 2024-05-17 ASSESSMENT — PAIN SCALES - GENERAL
PAINLEVEL_OUTOF10: 0
PAINLEVEL_OUTOF10: 0

## 2024-05-17 NOTE — PALLIATIVE CARE
Palliative Medicine       Patient is a 83 y.o. male with a PMHx of dementia, DM II, HLD, and COPD . Patient was medically extubated on 5/12/2024. Speech therapy is following and completed swallow study which showed Weak/delayed laryngeal elevation/excursion to palpation. Pt continues to be at a high risk of aspiration, malnutrition, and dehydration with PO. Recommend continuation of NPO.     Patient was seen at bedside. Mr. Bobby remains altered with weak cough. Not able to follow commands. He alerted briefly but remain to rest quickly.  O2 at 40 liters FiO2 65. Patient requires right wrist restraints due to pulling at Dobbhoff.     Call placed to Aimee Laisia. She was able to link her sister, Jena Narvaez in on the call.  This writer provided brief update of patient's respiratory status including the increase in O2 needs, patient now requiring 65% FiO2.  Introduced conversation round decision for possible PEG evaluation verses comfort feeds. Discussed the benefits and burdens of artificial nutrition via PEG tube since it has been determined patient is not safe to take foods orally.  Explained comfort feeds and the risk for a decline in patient's respiratory status.  Readdressed code status and reintubation with sisters. Ms Crump shared that she would like time to consult with her other siblings. This writer emailed printed information on tube feeding, CPR, Ventilator use, Palliative and hospice care to Aimee.     PLAN: Patient remains full code with full interventions at this time. Family meeting planned 12pm on Monday 5/20 to discuss goals of care. Asked Aimee to contact attending over weekend if goals of care change.     Hetal ZAMORAN, RN, PN  Palliative Medicine Inpatient RN  Palliative [facility] Line: [phone]

## 2024-05-17 NOTE — PLAN OF CARE
Problem: Occupational Therapy - Adult  Goal: By Discharge: Performs self-care activities at highest level of function for planned discharge setting.  See evaluation for individualized goals.  Description: Occupational Therapy Goals:  Initiated 5/14/2024 to be met within 7-10 days.    1.  Patient will perform grooming with minimal assistance/contact guard assist.   2.  Patient will perform upper body dressing with minimal assistance/contact guard assist.  3.  Patient will perform bed mobility with minimal assistance in preparation for seated ADL tasks.   4.  Patient will perform toilet transfers with minimal assistance/contact guard assist.  5.  Patient will perform all aspects of toileting with minimal assistance/contact guard assist.  6.  Patient will participate in upper extremity therapeutic exercise/activities with minimal assistance/contact guard assist for 8-10 minutes to increase strength/endurance for ADLs.    7.  Patient will utilize energy conservation techniques during functional activities with verbal cues.    PLOF: PLOF obtained from chart review. Pt lives w/ family in a two level house, able to live on main level, walk in shower. Pt has cane. Unsure how much assistance pt needed w/ ADLs and mobility.     5/17/2024 1255 by Caroline Monsivais OTA  Outcome: Progressing  OCCUPATIONAL THERAPY TREATMENT    Patient: Paul Bobby (83 y.o. male)  Date: 5/17/2024  Diagnosis: Acute respiratory failure (HCC) [J96.00]  Altered mental status, unspecified altered mental status type [R41.82]  Acute hypoxemic respiratory failure (HCC) [J96.01]  AMS (altered mental status) [R41.82] AMS (altered mental status)      Precautions: General Precautions, Fall Risk    Chart, occupational therapy assessment, plan of care, and goals were reviewed.  ASSESSMENT:  Pt remains on HFNC 40L @ 55% FiO2. Pt pleasantly confused; oriented to self only. Intermittent command following w/UE Therex and simple ADL grooming tasks at bed  Caregiver present  []  Bed alarm activated    COMMUNICATION/EDUCATION:   Patient Education  Education Given To: Patient  Education Provided: Plan of Care  Education Method: Verbal;Teach Back  Barriers to Learning: Cognition  Education Outcome: Continued education needed    Thank you for this referral.  LUKE Carroll  Minutes: 12

## 2024-05-17 NOTE — PROGRESS NOTES
[facility]  Pulmonary, Critical Care, and Sleep Medicine    Name: Paul Bobby MRN: [id number]   : 1940 Hospital: [facility]   Date: 2024        Pulmonary Medicine: Daily Progress Note    Admission Date:   2024  LOS: 11  MAR reviewed and pertinent medications noted or modified as needed    IMPRESSION:   Acute Hypoxic Respiratory Failure- requiring mechanical ventilator, in the setting multifocal PNA and parainfluenza 3, extubated  to HFNC   Aspiration PNA  Parainfluenza 3   Acute Encephalopathy-toxic/metabolic in nature superimposed on dementia, resolved back to baseline   Metabolic acidosis, lactic acidosis, resolved   ESTRELLA- prerenal azotemia vs. Ishemic ATN, resolved   Septic shock vs. hypovolemic- in the setting of multifocal PNA with partial b/l LL collapse and consildation/Aspiration PNA. Shock resolved  Chronic bronchitis and emhpysema with distal mucoid impaction, s/p multiple bronchs   COPD- not in acute exacerbation   Moderate colonic stool burden with large rectal stool ball   cholelithiasis without evidence of acute cholecystitis   DM II   HLD  BMI Body mass index is 23.79 kg/m².  CODE STATUS: Full Code No additional code details       Patient Active Problem List   Diagnosis    Weakness    Syncope    Dementia (HCC)    AMS (altered mental status)    Acute respiratory failure (HCC)    Aspiration pneumonia of both lungs due to vomit (HCC)    Aspiration of food    Mucus plugging of bronchi    Acute encephalopathy    Acute hypoxemic respiratory failure (HCC)    ESTRELLA (acute kidney injury) (HCC)    Septic shock (HCC)    Lactic acidosis    Multifocal pneumonia    Metabolic acidosis    Palliative care encounter    Goals of care, counseling/discussion    DNR (do not resuscitate) discussion    Aspiration into airway    Parainfluenza    Chronic obstructive pulmonary disease (HCC)    Encounter for palliative care          RECOMMENDATIONS:     PULM:  Monitor  Resistant      tetracycline >=16 ug/mL Resistant      trimethoprim-sulfamethoxazole <=10 ug/mL Sensitive      vancomycin 1 ug/mL Sensitive                           Culture, Respiratory [[id number]]  (Abnormal) Collected: 05/07/24 0810    Order Status: Completed Specimen: Endotracheal Updated: 05/10/24 1245     Special Requests NO SPECIAL REQUESTS        Gram Stain 2+ WBCS SEEN               RARE EPITHELIAL CELLS SEEN                  FEW GRAM POSITIVE COCCI IN PAIRS            FEW BUDDING YEAST        Culture       FEW NORMAL RESPIRATORY LUZ                  FEW Methicillin Resistant Staphylococcus aureus REFER TO [id number] FOR SENSIVTIES          Culture, MRSA, Screening [[id number]]  (Abnormal) Collected: 05/07/24 0630    Order Status: Completed Specimen: Nares Updated: 05/08/24 1555     Special Requests NO SPECIAL REQUESTS        Culture MRSA PRESENT               Screening of patient nares for MRSA is for surveillance purposes and, if positive, to facilitate isolation considerations in high risk settings. It is not intended for automatic decolonization interventions per se as regimens are not sufficiently effective to warrant routine use.        (NOTE) MRSA CALLED TO AND READ BACK BY SNEHAL 5/8 [facility] 1555    Legionella antigen, urine [[id number]] Collected: 05/06/24 1927    Order Status: Completed Specimen: Urine, random Updated: 05/07/24 0055     Legionella Antigen, Urine Negative       Strep Pneumoniae Antigen [[id number]] Collected: 05/06/24 1927    Order Status: Completed Specimen: Urine, random Updated: 05/07/24 0055     STREP PNEUMONIAE ANTIGEN, URINE Negative       Respiratory Panel, Molecular, with COVID-19 (Restricted: peds pts or suitable admitted adults) [[id number]]  (Abnormal) Collected: 05/06/24 1827    Order Status: Completed Specimen: Nasopharyngeal Updated: 05/06/24 2105     Adenovirus by PCR Not detected        Coronavirus 229E by PCR Not detected        Coronavirus HKU1 by PCR Not detected

## 2024-05-17 NOTE — PROGRESS NOTES
O2 requirement continues to increase-     05/17/24 1200   Treatment   Treatment Type Aerosol generator;HHN;Vibratory mucous clearing therapy or intervention;Vest   $Bronchial Hygiene $Subsequent P&PD   $Treatment Type $Inhaled Therapy/Meds   Medications Albuterol   Delivery Source In-line nebulizer   Duration 10   Is patient on O2? Y   Breath Sounds   Respiratory Pattern Regular   Upper Airway Sounds (S)  Coarse   Breath Sounds Bilateral (S)  Rhonchi   Oxygen Therapy/Pulse Ox   O2 Therapy Oxygen humidified   O2 Device Heated high flow cannula  (Vapotherm)   O2 Flow Rate (L/min) 40 L/min   FiO2  (S)  65 %  (increased from 55)   Pulse 90   Respirations 21   SpO2 90 %   Humidification Source Heated wire   Humidification Temp 36   Circuit Condensation Drained   Pulse Oximeter Device Mode Continuous   RT Vest   Vest Hertz 10   Vest Pressure 10   RT Misc Charges   $RT Education $HHN/MDI/IPPB Demo or Eval

## 2024-05-17 NOTE — PROGRESS NOTES
Respiratory status not improving, pt with ineffective cough and increasing O2 requirement.     05/17/24 0805   Treatment   Treatment Type Aerosol generator;HHN;Vibratory mucous clearing therapy or intervention;Vest   $Bronchial Hygiene $Subsequent P&PD   $Treatment Type $Inhaled Therapy/Meds   Medications Albuterol   Pre-Tx Pulse 88   Pre-Tx Resps 22   Delivery Source In-line nebulizer   Position Semi-Araujo's   Duration 10   Is patient on O2? Y   Breath Sounds   Respiratory Pattern Regular   Upper Airway Sounds Coarse   Breath Sounds Bilateral (S)  Rhonchi   Right Upper Lobe (S)  Rhonchi   Right Middle Lobe (S)  Diminished   Right Lower Lobe (S)  Diminished   Left Upper Lobe (S)  Coarse crackles   Left Lower Lobe (S)  Coarse crackles   Oxygen Therapy/Pulse Ox   O2 Therapy Oxygen humidified   $Oxygen $Daily Charge   O2 Device Heated high flow cannula  (Vapotherm)   O2 Flow Rate (L/min) 40 L/min   FiO2  (S)  45 %  (increased from 40)   Pulse 85   Respirations 22   SpO2 92 %   Humidification Source Heated wire   Humidification Temp 36   Circuit Condensation Drained   Pulse Oximeter Device Mode Continuous   $Pulse Oximeter $Spot check (multiple/continuous)   RT Vest   Vest Hertz 10   Vest Pressure 10   Cough/Sputum   Cough (S)  Weak;Congested;Non-productive   Frequency (S)  Frequent   Sputum Amount (S)  GREGORIO  (Ineffective, non productive cough)   RT Misc Charges   $RT Education $HHN/MDI/IPPB Demo or Eval

## 2024-05-17 NOTE — PROGRESS NOTES
nebulizer solution 4 mL  4 mL Nebulization Q4H RT    And    albuterol (PROVENTIL) (2.5 MG/3ML) 0.083% nebulizer solution 2.5 mg  2.5 mg Nebulization Q4H RT    vancomycin (VANCOCIN) 1250 mg in 250 mL IVPB  1,250 mg IntraVENous Q12H    CENTRUM/CERTA-EZ with minerals oral solution 15 mL  15 mL Oral Daily    polyethylene glycol (GLYCOLAX) packet 17 g  17 g Per NG tube Daily PRN    budesonide (PULMICORT) nebulizer suspension 1 mg  1 mg Nebulization BID RT    chlorhexidine (PERIDEX) 0.12 % solution 15 mL  15 mL Mouth/Throat BID    ondansetron (ZOFRAN-ODT) disintegrating tablet 4 mg  4 mg Oral Q8H PRN    Or    ondansetron (ZOFRAN) injection 4 mg  4 mg IntraVENous Q6H PRN    acetaminophen (TYLENOL) tablet 650 mg  650 mg Oral Q6H PRN    Or    acetaminophen (TYLENOL) suppository 650 mg  650 mg Rectal Q6H PRN    heparin (porcine) injection 5,000 Units  5,000 Units SubCUTAneous 3 times per day    ipratropium 0.5 mg-albuterol 2.5 mg (DUONEB) nebulizer solution 1 Dose  1 Dose Inhalation Q4H PRN    thiamine (B-1) injection 100 mg  100 mg IntraVENous Daily    docusate (COLACE) 50 MG/5ML liquid 100 mg  100 mg Oral BID        Labs:    Recent Results (from the past 24 hour(s))   POCT Glucose    Collection Time: 05/16/24  5:40 PM   Result Value Ref Range    POC Glucose 125 (H) 70 - 110 mg/dL   POCT Glucose    Collection Time: 05/16/24 11:32 PM   Result Value Ref Range    POC Glucose 186 (H) 70 - 110 mg/dL   Basic Metabolic Panel w/ Reflex to MG    Collection Time: 05/17/24  1:54 AM   Result Value Ref Range    Sodium 139 136 - 145 mmol/L    Potassium 4.1 3.5 - 5.5 mmol/L    Chloride 108 100 - 111 mmol/L    CO2 25 21 - 32 mmol/L    Anion Gap 6 3.0 - 18 mmol/L    Glucose 168 (H) 74 - 99 mg/dL    BUN 19 (H) 7.0 - 18 MG/DL    Creatinine 0.73 0.6 - 1.3 MG/DL    BUN/Creatinine Ratio 26 (H) 12 - 20      Est, Glom Filt Rate >90 >60 ml/min/1.73m2    Calcium 8.4 (L) 8.5 - 10.1 MG/DL   Phosphorus    Collection Time: 05/17/24  1:54 AM   Result

## 2024-05-17 NOTE — PROGRESS NOTES
Yes; 10; Q 6 hours; 65; 50; Q 4 hours    Nutrition intake per I/O: Total daily TF provision on average of 715 ml x last 7 days per I/O. Provides 62% kcal, 67% protein.    Anthropometric Measures:  Height: 177.8 cm (5' 10\")  Ideal Body Weight (IBW): 166 lbs (75 kg)    Admission Body Weight: 74.8 kg (165 lb) (estimated)  Current Body Weight: 75.2 kg (165 lb 12.6 oz), 93.8 % IBW.    Current BMI (kg/m2): 23.8  BMI Categories: Normal Weight (BMI 22.0 to 24.9) age over 65    Estimated Daily Nutrient Needs:  Energy Requirements Based On: Formula  Weight Used for Energy Requirements: Current  Energy (kcal/day): 1720-2007 (MSJ 1.2-1.4)  Weight Used for Protein Requirements: Current  Protein (g/day):  (1.2-2)  Method Used for Fluid Requirements: 1 ml/kcal  Fluid (ml/day): 1720-2007    Nutrition Diagnosis:   Swallowing difficulty related to cognitive or neurological impairment, impaired respiratory function (s/p ext, dysphagia) as evidenced by NPO or clear liquid status due to medical condition, nutrition support - enteral nutrition (SLP rec'd NPO)  Severe malnutrition, In context of acute, non-illness related related to inadequate protein-energy intake, acute injury/trauma, impaired respiratory function as evidenced by Criteria as identified in malnutrition assessment    Nutrition Interventions:   Food and/or Nutrient Delivery: Continue NPO, Continue Current Tube Feeding  Nutrition Education/Counseling: No recommendation at this time  Coordination of Nutrition Care: Continue to monitor while inpatient  Plan of Care discussed with: RN    Goals:  Previous Goal Met: Progressing toward Goal(s) (slowly)  Goals: Meet at least 75% of estimated needs, by next RD assessment       Nutrition Monitoring and Evaluation:   Behavioral-Environmental Outcomes: None Identified  Food/Nutrient Intake Outcomes: Diet Advancement/Tolerance, Enteral Nutrition Intake/Tolerance  Physical Signs/Symptoms Outcomes: Biochemical Data, Constipation,  GI Status, Nausea or Vomiting, Fluid Status or Edema, Hemodynamic Status, Weight, Skin    Discharge Planning:    Too soon to determine     Megan Dockweiler, MS, RD, [facility]  Contact: [phone]

## 2024-05-17 NOTE — PROGRESS NOTES
AUSTIN Khan RN and RAQUEL Licona have reviewed the chart and verify that the restraint order matches that of the restraint documentation, the order is not , and documentation is complete per the type of restraints implemented based on policy and free from omissions.

## 2024-05-17 NOTE — PROGRESS NOTES
Palliative Medicine Follow Up  Patient Name: Paul Bobby  YOB: 1940  MRN: [id number]  Age: 83 y.o.  Gender: male    Date of Initial Consult: 5/7/2024  Date of Service: 5/17/2024  Time: 12:05 PM  Provider: MARTHA Pennington  Hospital Day: 12  Admit Date: 5/6/2024  Referring Provider: Renetta Sanchez NP      Reasons for Consultation:  Goals of Care/ACP/symptom management    HISTORY OF PRESENT ILLNESS (HPI):   Paul Bobby is a 83 y.o. male with a past medical history of COPD, diabetes, hyperlipidemia, and dementia, who was admitted on 5/6/2024 from [facility] with a diagnosis of acute hypoxic respiratory failure, aspiration pneumonia in setting of parainfluenza 3.  Patient was initially brought to emergency room for further evaluation of persistent [facility] and [facility].  Initially patient was placed on NRB followed by BiPAP due to acute hypoxic respiratory failure.  Due to patient's mental function and an episode of vomiting while on BiPAP, patient was subsequently intubated and transferred to ICU. Palliative care was consulted to assist in goals of care.    5/17/2024  Patient  on HFNC 40L and 45% FIO2 He is receiving tube feeds via SBFT in nare. Looks ill and weaker today. Barely opens eyes to voice. Restraint on right wrist. Nurse in room. Patient has been downgraded to intermediate care but remains in ICU unit.    5/15/2024 Patient seen with Lana AGUERO. Update received from MANAN Sanchez on patient who states he is high risk for aspirating again, he has not passed his swallow evaluation and may need a PEG tube versus comfort feeds. Patient seen resting with eyes closed, opened eyes to name. On HFNC, receiving tube feeds via SBFT in nare. Did not answer questions, only stated 'I think I am.'      5/13/24: Patient was seen in presence of palliative care staff member.  Patient is currently on high flow oxygen with liters flow of 30 and FiO2 40  grief assessment:   [x] Normal  / [] Maladaptive     If \"Maladaptive\" to discuss with social work during IDT         LAB AND IMAGING FINDINGS:   Objective data reviewed:  labs, images, records, medication use, vitals, and chart     FINAL COMMENTS   Thank you for allowing Palliative Medicine to participate in the care of Paul Bboby.    Only check if applicable and billing time based rather than MDM  [x] The total encounter time on this service date was _35___ minutes which was spent performing a face-to-face encounter and personally completing the provider-level activities documented in the note. This includes time spent prior to the visit and after the visit in direct care of the patient. This time does not include time spent in any separately reportable services.    Electronically signed by   MARTHA Pennington  Palliative Care Team  on 5/17/2024 at 12:05 PM

## 2024-05-18 PROBLEM — B34.8 PARAINFLUENZA INFECTION: Status: ACTIVE | Noted: 2024-05-18

## 2024-05-18 LAB
ANION GAP SERPL CALC-SCNC: 6 MMOL/L (ref 3–18)
BASOPHILS # BLD: 0.1 K/UL (ref 0–0.1)
BASOPHILS NFR BLD: 1 % (ref 0–2)
BUN SERPL-MCNC: 19 MG/DL (ref 7–18)
BUN/CREAT SERPL: 23 (ref 12–20)
CALCIUM SERPL-MCNC: 8.9 MG/DL (ref 8.5–10.1)
CHLORIDE SERPL-SCNC: 105 MMOL/L (ref 100–111)
CO2 SERPL-SCNC: 26 MMOL/L (ref 21–32)
CREAT SERPL-MCNC: 0.81 MG/DL (ref 0.6–1.3)
DIFFERENTIAL METHOD BLD: ABNORMAL
EOSINOPHIL # BLD: 0.1 K/UL (ref 0–0.4)
EOSINOPHIL NFR BLD: 1 % (ref 0–5)
ERYTHROCYTE [DISTWIDTH] IN BLOOD BY AUTOMATED COUNT: 14 % (ref 11.6–14.5)
GLUCOSE BLD STRIP.AUTO-MCNC: 139 MG/DL (ref 70–110)
GLUCOSE SERPL-MCNC: 165 MG/DL (ref 74–99)
HCT VFR BLD AUTO: 38.5 % (ref 36–48)
HGB BLD-MCNC: 12.5 G/DL (ref 13–16)
IMM GRANULOCYTES # BLD AUTO: 0.1 K/UL (ref 0–0.04)
IMM GRANULOCYTES NFR BLD AUTO: 1 % (ref 0–0.5)
LYMPHOCYTES # BLD: 1.2 K/UL (ref 0.9–3.6)
LYMPHOCYTES NFR BLD: 14 % (ref 21–52)
MAGNESIUM SERPL-MCNC: 2.2 MG/DL (ref 1.6–2.6)
MCH RBC QN AUTO: 30.9 PG (ref 24–34)
MCHC RBC AUTO-ENTMCNC: 32.5 G/DL (ref 31–37)
MCV RBC AUTO: 95.1 FL (ref 78–100)
MONOCYTES # BLD: 0.6 K/UL (ref 0.05–1.2)
MONOCYTES NFR BLD: 7 % (ref 3–10)
NEUTS SEG # BLD: 6.8 K/UL (ref 1.8–8)
NEUTS SEG NFR BLD: 77 % (ref 40–73)
NRBC # BLD: 0 K/UL (ref 0–0.01)
NRBC BLD-RTO: 0 PER 100 WBC
PHOSPHATE SERPL-MCNC: 3.3 MG/DL (ref 2.5–4.9)
PLATELET # BLD AUTO: 343 K/UL (ref 135–420)
PMV BLD AUTO: 10.8 FL (ref 9.2–11.8)
POTASSIUM SERPL-SCNC: 3.9 MMOL/L (ref 3.5–5.5)
RBC # BLD AUTO: 4.05 M/UL (ref 4.35–5.65)
SODIUM SERPL-SCNC: 137 MMOL/L (ref 136–145)
VANCOMYCIN TROUGH SERPL-MCNC: 22.4 UG/ML (ref 10–20)
WBC # BLD AUTO: 8.8 K/UL (ref 4.6–13.2)

## 2024-05-18 PROCEDURE — 6360000002 HC RX W HCPCS: Performed by: INTERNAL MEDICINE

## 2024-05-18 PROCEDURE — 94669 MECHANICAL CHEST WALL OSCILL: CPT

## 2024-05-18 PROCEDURE — 99232 SBSQ HOSP IP/OBS MODERATE 35: CPT | Performed by: INTERNAL MEDICINE

## 2024-05-18 PROCEDURE — 6370000000 HC RX 637 (ALT 250 FOR IP): Performed by: EMERGENCY MEDICINE

## 2024-05-18 PROCEDURE — 6370000000 HC RX 637 (ALT 250 FOR IP): Performed by: NURSE PRACTITIONER

## 2024-05-18 PROCEDURE — 6360000002 HC RX W HCPCS: Performed by: NURSE PRACTITIONER

## 2024-05-18 PROCEDURE — 94640 AIRWAY INHALATION TREATMENT: CPT

## 2024-05-18 PROCEDURE — 6370000000 HC RX 637 (ALT 250 FOR IP): Performed by: INTERNAL MEDICINE

## 2024-05-18 PROCEDURE — 80048 BASIC METABOLIC PNL TOTAL CA: CPT

## 2024-05-18 PROCEDURE — 2580000003 HC RX 258: Performed by: INTERNAL MEDICINE

## 2024-05-18 PROCEDURE — 82962 GLUCOSE BLOOD TEST: CPT

## 2024-05-18 PROCEDURE — 36415 COLL VENOUS BLD VENIPUNCTURE: CPT

## 2024-05-18 PROCEDURE — 84100 ASSAY OF PHOSPHORUS: CPT

## 2024-05-18 PROCEDURE — 94761 N-INVAS EAR/PLS OXIMETRY MLT: CPT

## 2024-05-18 PROCEDURE — 83735 ASSAY OF MAGNESIUM: CPT

## 2024-05-18 PROCEDURE — 2000000000 HC ICU R&B

## 2024-05-18 PROCEDURE — 80202 ASSAY OF VANCOMYCIN: CPT

## 2024-05-18 PROCEDURE — 85025 COMPLETE CBC W/AUTO DIFF WBC: CPT

## 2024-05-18 PROCEDURE — 2700000000 HC OXYGEN THERAPY PER DAY

## 2024-05-18 RX ORDER — ARFORMOTEROL TARTRATE 15 UG/2ML
15 SOLUTION RESPIRATORY (INHALATION)
Status: DISCONTINUED | OUTPATIENT
Start: 2024-05-18 | End: 2024-05-24 | Stop reason: HOSPADM

## 2024-05-18 RX ORDER — SODIUM CHLORIDE FOR INHALATION 3 %
4 VIAL, NEBULIZER (ML) INHALATION PRN
Status: DISCONTINUED | OUTPATIENT
Start: 2024-05-18 | End: 2024-05-24 | Stop reason: HOSPADM

## 2024-05-18 RX ORDER — ALBUTEROL SULFATE 2.5 MG/3ML
2.5 SOLUTION RESPIRATORY (INHALATION) EVERY 6 HOURS PRN
Status: DISCONTINUED | OUTPATIENT
Start: 2024-05-18 | End: 2024-05-24 | Stop reason: HOSPADM

## 2024-05-18 RX ADMIN — ALBUTEROL SULFATE 2.5 MG: 2.5 SOLUTION RESPIRATORY (INHALATION) at 08:17

## 2024-05-18 RX ADMIN — HEPARIN SODIUM 5000 UNITS: 5000 INJECTION INTRAVENOUS; SUBCUTANEOUS at 22:59

## 2024-05-18 RX ADMIN — ARFORMOTEROL TARTRATE 15 MCG: 15 SOLUTION RESPIRATORY (INHALATION) at 19:58

## 2024-05-18 RX ADMIN — ALBUTEROL SULFATE 2.5 MG: 2.5 SOLUTION RESPIRATORY (INHALATION) at 00:38

## 2024-05-18 RX ADMIN — VANCOMYCIN 1250 MG: 1.75 INJECTION, SOLUTION INTRAVENOUS at 02:10

## 2024-05-18 RX ADMIN — SODIUM CHLORIDE SOLN NEBU 3% 4 ML: 3 NEBU SOLN at 08:17

## 2024-05-18 RX ADMIN — SODIUM CHLORIDE SOLN NEBU 3% 4 ML: 3 NEBU SOLN at 05:00

## 2024-05-18 RX ADMIN — ALBUTEROL SULFATE 2.5 MG: 2.5 SOLUTION RESPIRATORY (INHALATION) at 05:00

## 2024-05-18 RX ADMIN — SODIUM CHLORIDE SOLN NEBU 3% 4 ML: 3 NEBU SOLN at 00:38

## 2024-05-18 RX ADMIN — Medication 15 ML: at 08:54

## 2024-05-18 RX ADMIN — BUDESONIDE 1 MG: 1 SUSPENSION RESPIRATORY (INHALATION) at 08:17

## 2024-05-18 RX ADMIN — THIAMINE HYDROCHLORIDE 100 MG: 100 INJECTION, SOLUTION INTRAMUSCULAR; INTRAVENOUS at 08:54

## 2024-05-18 RX ADMIN — CHLORHEXIDINE GLUCONATE 15 ML: 1.2 RINSE ORAL at 08:54

## 2024-05-18 RX ADMIN — DOCUSATE SODIUM LIQUID 100 MG: 100 LIQUID ORAL at 08:54

## 2024-05-18 RX ADMIN — HEPARIN SODIUM 5000 UNITS: 5000 INJECTION INTRAVENOUS; SUBCUTANEOUS at 06:17

## 2024-05-18 RX ADMIN — HEPARIN SODIUM 5000 UNITS: 5000 INJECTION INTRAVENOUS; SUBCUTANEOUS at 13:33

## 2024-05-18 RX ADMIN — CHLORHEXIDINE GLUCONATE 15 ML: 1.2 RINSE ORAL at 20:53

## 2024-05-18 RX ADMIN — DOCUSATE SODIUM LIQUID 100 MG: 100 LIQUID ORAL at 20:53

## 2024-05-18 RX ADMIN — VANCOMYCIN 1250 MG: 1.75 INJECTION, SOLUTION INTRAVENOUS at 13:33

## 2024-05-18 RX ADMIN — BUDESONIDE 1 MG: 1 SUSPENSION RESPIRATORY (INHALATION) at 19:58

## 2024-05-18 ASSESSMENT — PAIN SCALES - WONG BAKER
WONGBAKER_NUMERICALRESPONSE: NO HURT

## 2024-05-18 ASSESSMENT — PAIN SCALES - GENERAL
PAINLEVEL_OUTOF10: 0

## 2024-05-18 NOTE — PROGRESS NOTES
Nighttime hospitalist note  I received a call from the nurse stating that patient has been trying to pull out his NG tube with the right hand.  Patient has soft right wrist restraints which have  and RN is requesting removal.  I have advised RN to renew the soft wrist restraints for 10 hours and then reach out to the daytime rounding hospitalist for further evaluation and assessment

## 2024-05-18 NOTE — PLAN OF CARE
Problem: Safety - Medical Restraint  Goal: Remains free of injury from restraints (Restraint for Interference with Medical Device)  Description: INTERVENTIONS:  1. Determine that other, less restrictive measures have been tried or would not be effective before applying the restraint  2. Evaluate the patient's condition at the time of restraint application  3. Inform patient/family regarding the reason for restraint  4. Q2H: Monitor safety, psychosocial status, comfort, nutrition and hydration  Recent Flowsheet Documentation  Taken 5/17/2024 1800 by Monae Parsons RN  Remains free of injury from restraints (restraint for interference with medical device):   Determine that other, less restrictive measures have been tried or would not be effective before applying the restraint   Evaluate the patient's condition at the time of restraint application   Inform patient/family regarding the reason for restraint   Every 2 hours: Monitor safety, psychosocial status, comfort, nutrition and hydration  Taken 5/17/2024 1600 by Monae Parsons RN  Remains free of injury from restraints (restraint for interference with medical device):   Determine that other, less restrictive measures have been tried or would not be effective before applying the restraint   Evaluate the patient's condition at the time of restraint application   Inform patient/family regarding the reason for restraint   Every 2 hours: Monitor safety, psychosocial status, comfort, nutrition and hydration  Taken 5/17/2024 1400 by Monae Parsons RN  Remains free of injury from restraints (restraint for interference with medical device):   Determine that other, less restrictive measures have been tried or would not be effective before applying the restraint   Evaluate the patient's condition at the time of restraint application  Taken 5/17/2024 1200 by Monae Parsons RN  Remains free of injury from restraints (restraint for interference with medical device):   Determine

## 2024-05-18 NOTE — PROGRESS NOTES
[facility]  Pulmonary, Critical Care, and Sleep Medicine    Name: Paul Bobby MRN: [id number]   : 1940 Hospital: [facility]   Date: 2024        Pulmonary Medicine: Daily Progress Note    Admission Date:   2024  LOS: 12  MAR reviewed and pertinent medications noted or modified as needed    IMPRESSION:   Acute Hypoxic Respiratory Failure- requiring mechanical ventilator, in the setting multifocal PNA and parainfluenza 3, extubated  to HFNC   Aspiration PNA  Parainfluenza 3   Acute Encephalopathy-toxic/metabolic in nature superimposed on dementia, resolved back to baseline   Metabolic acidosis, lactic acidosis, resolved   ESTRELLA- prerenal azotemia vs. Ishemic ATN, resolved   Septic shock vs. hypovolemic- in the setting of multifocal PNA with partial b/l LL collapse and consildation/Aspiration PNA. Shock resolved  Chronic bronchitis and emhpysema with distal mucoid impaction, s/p multiple bronchs   COPD- not in acute exacerbation   Moderate colonic stool burden with large rectal stool ball   cholelithiasis without evidence of acute cholecystitis   DM II   HLD  BMI Body mass index is 23.79 kg/m².  CODE STATUS: Full Code No additional code details       Patient Active Problem List   Diagnosis    Weakness    Syncope    Dementia (HCC)    AMS (altered mental status)    Acute respiratory failure (HCC)    Aspiration pneumonia of both lungs due to vomit (HCC)    Aspiration of food    Mucus plugging of bronchi    Acute encephalopathy    Acute hypoxemic respiratory failure (HCC)    ESTRELLA (acute kidney injury) (HCC)    Septic shock (HCC)    Lactic acidosis    Multifocal pneumonia    Metabolic acidosis    Palliative care encounter    Goals of care, counseling/discussion    DNR (do not resuscitate) discussion    Aspiration into airway    Parainfluenza    Chronic obstructive pulmonary disease (HCC)    Encounter for palliative care          RECOMMENDATIONS:     PULM:  Monitor  the failure of one or more body systems and/or organ systems due to respiratory distress, hypoxia, cardiac dysrhythmia.     Services included the following:  -reviewing nursing notes and old charts  -vital sign assessments   -direct patient care  -medication orders and management  -interpreting and reviewing diagnostic studies/labs  -re-evaluations  -documentation time        Aggregate care time was 25 minutes, which includes only time during which I was engaged in work directly related to the patient's care as described above.    During this entire length of time I was immediately available to the patient. The reason for providing this level of medical care for this critically ill patient was due a critical illness that impaired one or more vital organ systems such that there was a high probability of imminent or life threatening deterioration in the patients condition. This care involved high complexity decision making to assess, manipulate, and support vital system functions, to treat this degreee vital organ system failure and to prevent further life threatening deterioration of the patient’s condition      Complex decision making was made in the evaluation and management plans during this consultation.  More than 50% of time was spent in counseling and coordination of care including review of data and discussion with other team members.    Toy Norton MD  Critical Care Medicine

## 2024-05-18 NOTE — PROGRESS NOTES
[facility]   Pharmacy Pharmacokinetic Monitoring Service - Vancomycin    Indication: sepsis  Goal AUC/TWILA: 400-600  Day of Therapy: 13  Additional Antimicrobials: none    Pertinent Laboratory Values:   Wt Readings from Last 1 Encounters:   05/17/24 75.2 kg (165 lb 12.6 oz)     Temp Readings from Last 1 Encounters:   05/18/24 98.9 °F (37.2 °C) (Oral)     Estimated Creatinine Clearance: 71 mL/min (based on SCr of 0.81 mg/dL).    Recent Labs     05/17/24  0154 05/18/24  0308   CREATININE 0.73 0.81   BUN 19* 19*   WBC 10.1 8.8     Pertinent Cultures:  Date Source Results   5/6 blood CoNS in 1/2 5/7 ET aspirate MRSA, yeast   5/7 ET aspirate MRSA, yeast   5/8 blood NGTD   5/8 BAL Light vikas   5/10 BAL MRSA, Kleb   MRSA Nasal Swab: present    Assessment:  Date Current Dose Level (mg/L) Timing of Level (h) AUC/TWILA   5/6 1,750 mg x1 - - -   5/7 - - - -   5/8 1,500 mg x1 5.4 32 NA   5/9 1,250 mg x1 10.9 17 NA   5/10 1,500 mg x1 15.6 15 NA   5/11 1,250 mg q18h 12.4 16 532   5/12 1,250 mg q18h - - -   5/14 1,250mg q18h      5/15 1,250mg q18h      5/16 1,250mg q18h  1,250mg q12h 13.8 10.5 372    5/17 1,250mg q12h      5/18 1,250 mg q12h 22.4 11 647     Plan:  Current dose is supratherapeutic with AUC > 600  Will decrease dose to Vancomycin 1000 mg q12h for est AUC/tr = 519/15.8  No level ordered at this time   Pharmacy will continue to monitor patient and adjust therapy as indicated      MULUGETA VILLARREAL, [facility]  5/18/2024

## 2024-05-18 NOTE — PLAN OF CARE
Problem: Safety - Adult  Goal: Free from fall injury  Outcome: Progressing     Problem: Pain  Goal: Verbalizes/displays adequate comfort level or baseline comfort level  Outcome: Progressing     Problem: Chronic Conditions and Co-morbidities  Goal: Patient's chronic conditions and co-morbidity symptoms are monitored and maintained or improved  Outcome: Progressing     Problem: Skin/Tissue Integrity  Goal: Absence of new skin breakdown  Description: 1.  Monitor for areas of redness and/or skin breakdown  2.  Assess vascular access sites hourly  3.  Every 4-6 hours minimum:  Change oxygen saturation probe site  4.  Every 4-6 hours:  If on nasal continuous positive airway pressure, respiratory therapy assess nares and determine need for appliance change or resting period.  Outcome: Progressing     Problem: Nutrition Deficit:  Goal: Optimize nutritional status  Outcome: Progressing

## 2024-05-19 LAB
ANION GAP SERPL CALC-SCNC: 5 MMOL/L (ref 3–18)
BASOPHILS # BLD: 0.1 K/UL (ref 0–0.1)
BASOPHILS NFR BLD: 1 % (ref 0–2)
BUN SERPL-MCNC: 20 MG/DL (ref 7–18)
BUN/CREAT SERPL: 27 (ref 12–20)
CALCIUM SERPL-MCNC: 9.2 MG/DL (ref 8.5–10.1)
CHLORIDE SERPL-SCNC: 104 MMOL/L (ref 100–111)
CO2 SERPL-SCNC: 27 MMOL/L (ref 21–32)
CREAT SERPL-MCNC: 0.73 MG/DL (ref 0.6–1.3)
DIFFERENTIAL METHOD BLD: ABNORMAL
EOSINOPHIL # BLD: 0.2 K/UL (ref 0–0.4)
EOSINOPHIL NFR BLD: 2 % (ref 0–5)
ERYTHROCYTE [DISTWIDTH] IN BLOOD BY AUTOMATED COUNT: 13.6 % (ref 11.6–14.5)
GLUCOSE BLD STRIP.AUTO-MCNC: 156 MG/DL (ref 70–110)
GLUCOSE BLD STRIP.AUTO-MCNC: 170 MG/DL (ref 70–110)
GLUCOSE BLD STRIP.AUTO-MCNC: 184 MG/DL (ref 70–110)
GLUCOSE SERPL-MCNC: 147 MG/DL (ref 74–99)
HCT VFR BLD AUTO: 38 % (ref 36–48)
HGB BLD-MCNC: 11.9 G/DL (ref 13–16)
IMM GRANULOCYTES # BLD AUTO: 0 K/UL (ref 0–0.04)
IMM GRANULOCYTES NFR BLD AUTO: 0 % (ref 0–0.5)
LYMPHOCYTES # BLD: 1.5 K/UL (ref 0.9–3.6)
LYMPHOCYTES NFR BLD: 19 % (ref 21–52)
MAGNESIUM SERPL-MCNC: 2.1 MG/DL (ref 1.6–2.6)
MCH RBC QN AUTO: 29.8 PG (ref 24–34)
MCHC RBC AUTO-ENTMCNC: 31.3 G/DL (ref 31–37)
MCV RBC AUTO: 95.2 FL (ref 78–100)
MONOCYTES # BLD: 0.7 K/UL (ref 0.05–1.2)
MONOCYTES NFR BLD: 8 % (ref 3–10)
NEUTS SEG # BLD: 5.5 K/UL (ref 1.8–8)
NEUTS SEG NFR BLD: 70 % (ref 40–73)
NRBC # BLD: 0 K/UL (ref 0–0.01)
NRBC BLD-RTO: 0 PER 100 WBC
PHOSPHATE SERPL-MCNC: 3.6 MG/DL (ref 2.5–4.9)
PLATELET # BLD AUTO: 307 K/UL (ref 135–420)
PMV BLD AUTO: 11.4 FL (ref 9.2–11.8)
POTASSIUM SERPL-SCNC: 4.3 MMOL/L (ref 3.5–5.5)
RBC # BLD AUTO: 3.99 M/UL (ref 4.35–5.65)
SODIUM SERPL-SCNC: 136 MMOL/L (ref 136–145)
WBC # BLD AUTO: 7.8 K/UL (ref 4.6–13.2)

## 2024-05-19 PROCEDURE — 2000000000 HC ICU R&B

## 2024-05-19 PROCEDURE — 2580000003 HC RX 258: Performed by: INTERNAL MEDICINE

## 2024-05-19 PROCEDURE — 94668 MNPJ CHEST WALL SBSQ: CPT

## 2024-05-19 PROCEDURE — 83735 ASSAY OF MAGNESIUM: CPT

## 2024-05-19 PROCEDURE — 6370000000 HC RX 637 (ALT 250 FOR IP): Performed by: EMERGENCY MEDICINE

## 2024-05-19 PROCEDURE — 94761 N-INVAS EAR/PLS OXIMETRY MLT: CPT

## 2024-05-19 PROCEDURE — 6360000002 HC RX W HCPCS: Performed by: INTERNAL MEDICINE

## 2024-05-19 PROCEDURE — 84100 ASSAY OF PHOSPHORUS: CPT

## 2024-05-19 PROCEDURE — 6370000000 HC RX 637 (ALT 250 FOR IP): Performed by: INTERNAL MEDICINE

## 2024-05-19 PROCEDURE — 6360000002 HC RX W HCPCS: Performed by: NURSE PRACTITIONER

## 2024-05-19 PROCEDURE — 99232 SBSQ HOSP IP/OBS MODERATE 35: CPT | Performed by: INTERNAL MEDICINE

## 2024-05-19 PROCEDURE — 82962 GLUCOSE BLOOD TEST: CPT

## 2024-05-19 PROCEDURE — 94640 AIRWAY INHALATION TREATMENT: CPT

## 2024-05-19 PROCEDURE — 85025 COMPLETE CBC W/AUTO DIFF WBC: CPT

## 2024-05-19 PROCEDURE — 2700000000 HC OXYGEN THERAPY PER DAY

## 2024-05-19 PROCEDURE — 94669 MECHANICAL CHEST WALL OSCILL: CPT

## 2024-05-19 PROCEDURE — 6370000000 HC RX 637 (ALT 250 FOR IP): Performed by: NURSE PRACTITIONER

## 2024-05-19 PROCEDURE — 36415 COLL VENOUS BLD VENIPUNCTURE: CPT

## 2024-05-19 PROCEDURE — 80048 BASIC METABOLIC PNL TOTAL CA: CPT

## 2024-05-19 PROCEDURE — 94664 DEMO&/EVAL PT USE INHALER: CPT

## 2024-05-19 RX ORDER — CALCIUM GLUCONATE 20 MG/ML
1000 INJECTION, SOLUTION INTRAVENOUS ONCE
Status: DISCONTINUED | OUTPATIENT
Start: 2024-05-19 | End: 2024-05-19

## 2024-05-19 RX ADMIN — HEPARIN SODIUM 5000 UNITS: 5000 INJECTION INTRAVENOUS; SUBCUTANEOUS at 21:42

## 2024-05-19 RX ADMIN — VANCOMYCIN HYDROCHLORIDE 1000 MG: 1 INJECTION, POWDER, FOR SOLUTION INTRAVENOUS at 13:38

## 2024-05-19 RX ADMIN — BUDESONIDE 1 MG: 1 SUSPENSION RESPIRATORY (INHALATION) at 20:13

## 2024-05-19 RX ADMIN — DOCUSATE SODIUM LIQUID 100 MG: 100 LIQUID ORAL at 20:38

## 2024-05-19 RX ADMIN — Medication 15 ML: at 08:48

## 2024-05-19 RX ADMIN — DOCUSATE SODIUM LIQUID 100 MG: 100 LIQUID ORAL at 08:48

## 2024-05-19 RX ADMIN — ARFORMOTEROL TARTRATE 15 MCG: 15 SOLUTION RESPIRATORY (INHALATION) at 20:13

## 2024-05-19 RX ADMIN — CHLORHEXIDINE GLUCONATE 15 ML: 1.2 RINSE ORAL at 08:48

## 2024-05-19 RX ADMIN — THIAMINE HYDROCHLORIDE 100 MG: 100 INJECTION, SOLUTION INTRAMUSCULAR; INTRAVENOUS at 08:48

## 2024-05-19 RX ADMIN — ARFORMOTEROL TARTRATE 15 MCG: 15 SOLUTION RESPIRATORY (INHALATION) at 08:02

## 2024-05-19 RX ADMIN — BUDESONIDE 1 MG: 1 SUSPENSION RESPIRATORY (INHALATION) at 08:02

## 2024-05-19 RX ADMIN — VANCOMYCIN HYDROCHLORIDE 1000 MG: 1 INJECTION, POWDER, FOR SOLUTION INTRAVENOUS at 02:07

## 2024-05-19 RX ADMIN — HEPARIN SODIUM 5000 UNITS: 5000 INJECTION INTRAVENOUS; SUBCUTANEOUS at 13:37

## 2024-05-19 RX ADMIN — CHLORHEXIDINE GLUCONATE 15 ML: 1.2 RINSE ORAL at 20:38

## 2024-05-19 RX ADMIN — HEPARIN SODIUM 5000 UNITS: 5000 INJECTION INTRAVENOUS; SUBCUTANEOUS at 06:19

## 2024-05-19 ASSESSMENT — PAIN SCALES - WONG BAKER
WONGBAKER_NUMERICALRESPONSE: NO HURT

## 2024-05-19 ASSESSMENT — PAIN SCALES - GENERAL
PAINLEVEL_OUTOF10: 0

## 2024-05-19 NOTE — PROGRESS NOTES
[facility]   Pharmacy Pharmacokinetic Monitoring Service - Vancomycin    Indication: sepsis  Goal AUC/TWILA: 400-600  Day of Therapy: 14  Additional Antimicrobials: none    Pertinent Laboratory Values:   Wt Readings from Last 1 Encounters:   05/17/24 75.2 kg (165 lb 12.6 oz)     Temp Readings from Last 1 Encounters:   05/19/24 98.2 °F (36.8 °C) (Oral)     Estimated Creatinine Clearance: 79 mL/min (based on SCr of 0.73 mg/dL).    Recent Labs     05/18/24  0308 05/19/24  0453   CREATININE 0.81 0.73   BUN 19* 20*   WBC 8.8 7.8     Pertinent Cultures:  Date Source Results   5/6 blood CoNS in 1/2 5/7 ET aspirate MRSA, yeast   5/7 ET aspirate MRSA, yeast   5/8 blood NGTD   5/8 BAL Light vikas   5/10 BAL MRSA, Kleb   MRSA Nasal Swab: present    Assessment:  Date Current Dose Level (mg/L) Timing of Level (h) AUC/TWILA   5/6 1,750 mg x1 - - -   5/7 - - - -   5/8 1,500 mg x1 5.4 32 NA   5/9 1,250 mg x1 10.9 17 NA   5/10 1,500 mg x1 15.6 15 NA   5/11 1,250 mg q18h 12.4 16 532   5/12 1,250 mg q18h - - -   5/14 1,250mg q18h      5/15 1,250mg q18h      5/16 1,250mg q18h  1,250mg q12h 13.8 10.5 372    5/17 1,250mg q12h      5/18 1,250 mg q12h 22.4 11 647   5/19 1,000 mg q12h - - 471     Plan:  Current dose is therapeutic   Continue Vancomycin 1000 mg q12h  Random level for 5/20 @ 0600  Pharmacy will continue to monitor patient and adjust therapy as indicated      MULUGETA VILLARREAL [facility]  5/19/2024

## 2024-05-19 NOTE — PROGRESS NOTES
[]PO anticoagulant    Anticipated discharge: >2 days    Subjective:     Patient remains on high flow oxygen 40 L via nasal cannula.  Patient is more awake and able to answer questions.  Denies any chest or abdominal pain.  Continues to shortness of breath.  No nausea or vomiting.  Patient has NG tube.    Hospital Course:     Patient is a 83 y.o. male with a PMHx of dementia, DM II, HLD, and COPD who presented to [facility] ER with encephalopathy and hypoxia. Per EMS, patient PACE and was evaluated by the nurse practitioner who found him altered and vomiting. She was concerned he aspirated on his lunch and called EMS. On arrival to ER, he was on NRB with oxygen saturation In the 70's and tachypneic. Patient was initially placed on Bipap by RT with little improvement of oxygenation. ABG was done and demonstrated metabolic acidosis, no hypercapnia. D/w ED physician, patient was placed on Bipap without medical update that patient had been vomiting prior to arrival. Update was later provided by the daughter and patient was removed from Bipap. Decision was made to emergently intubate patient, however, prior to intubation patient suffered from large vomiting episode with inability to clear secretions. Apparently patient was not on Bipap at this time, unclear when patient was taken off. Patient continued with respiratory distress and lethargy, so patient was emergently intubated. Patient became hypotensive, refractory to IVF resuscitation and CVL was placed. Levophed was started. S/p bronch  which demonstrated mucous plugging, purulent secretions, and corn kernel particles. Patient was extubated  to NC, high risk for aspiration and failing SLP     Objective:   VS: BP (!) 124/100   Pulse 73   Temp 98.2 °F (36.8 °C) (Oral)   Resp 21   Ht 1.778 m (5' 10\")   Wt 75.2 kg (165 lb 12.6 oz)   SpO2 100%   BMI 23.79 kg/m²    Tmax/24hrs: Temp (24hrs), Av.4 °F (36.9 °C), Min:98.1 °F (36.7 °C), Max:98.9 °F (37.2        Imaging:  No results found.    Time spent reviewing records, independently interpreting results, obtaining history from patient or caregiver, performing physical exam, ordering tests and medications, communicating with specialists, documenting in the chart, and coordinating overall care is 35 minutes    Stevenson Mendez MD     May 19, 2024

## 2024-05-19 NOTE — PLAN OF CARE
Problem: Safety - Medical Restraint  Goal: Remains free of injury from restraints (Restraint for Interference with Medical Device)  Description: INTERVENTIONS:  1. Determine that other, less restrictive measures have been tried or would not be effective before applying the restraint  2. Evaluate the patient's condition at the time of restraint application  3. Inform patient/family regarding the reason for restraint  4. Q2H: Monitor safety, psychosocial status, comfort, nutrition and hydration  Recent Flowsheet Documentation  Taken 5/19/2024 0600 by Ajit Greene RN  Remains free of injury from restraints (restraint for interference with medical device): Every 2 hours: Monitor safety, psychosocial status, comfort, nutrition and hydration  Taken 5/19/2024 0400 by Ajit Greene RN  Remains free of injury from restraints (restraint for interference with medical device): Every 2 hours: Monitor safety, psychosocial status, comfort, nutrition and hydration  Taken 5/19/2024 0200 by Ajit Greene RN  Remains free of injury from restraints (restraint for interference with medical device): Every 2 hours: Monitor safety, psychosocial status, comfort, nutrition and hydration  Taken 5/19/2024 0000 by Ajit Greene RN  Remains free of injury from restraints (restraint for interference with medical device): Every 2 hours: Monitor safety, psychosocial status, comfort, nutrition and hydration  Taken 5/18/2024 2200 by Ajit Greene RN  Remains free of injury from restraints (restraint for interference with medical device): Every 2 hours: Monitor safety, psychosocial status, comfort, nutrition and hydration  Taken 5/18/2024 2000 by Ajit Greene RN  Remains free of injury from restraints (restraint for interference with medical device): Every 2 hours: Monitor safety, psychosocial status, comfort, nutrition and hydration     Problem: Safety - Medical Restraint  Goal: Remains free of injury from restraints  (Restraint for Interference with Medical Device)  Description: INTERVENTIONS:  1. Determine that other, less restrictive measures have been tried or would not be effective before applying the restraint  2. Evaluate the patient's condition at the time of restraint application  3. Inform patient/family regarding the reason for restraint  4. Q2H: Monitor safety, psychosocial status, comfort, nutrition and hydration  Outcome: Progressing  Flowsheets  Taken 5/19/2024 0600 by Ajit Greene RN  Remains free of injury from restraints (restraint for interference with medical device): Every 2 hours: Monitor safety, psychosocial status, comfort, nutrition and hydration  Taken 5/19/2024 0400 by Ajit Greene RN  Remains free of injury from restraints (restraint for interference with medical device): Every 2 hours: Monitor safety, psychosocial status, comfort, nutrition and hydration  Taken 5/19/2024 0200 by Ajit Greene RN  Remains free of injury from restraints (restraint for interference with medical device): Every 2 hours: Monitor safety, psychosocial status, comfort, nutrition and hydration  Taken 5/19/2024 0000 by Ajit Greene RN  Remains free of injury from restraints (restraint for interference with medical device): Every 2 hours: Monitor safety, psychosocial status, comfort, nutrition and hydration  Taken 5/18/2024 2200 by Ajit Greene RN  Remains free of injury from restraints (restraint for interference with medical device): Every 2 hours: Monitor safety, psychosocial status, comfort, nutrition and hydration  Taken 5/18/2024 2000 by Ajit Greene RN  Remains free of injury from restraints (restraint for interference with medical device): Every 2 hours: Monitor safety, psychosocial status, comfort, nutrition and hydration     Problem: Safety - Adult  Goal: Free from fall injury  Outcome: Progressing     Problem: Pain  Goal: Verbalizes/displays adequate comfort level or baseline comfort

## 2024-05-19 NOTE — PROGRESS NOTES
Special Requests NO SPECIAL REQUESTS        Culture NO GROWTH 6 DAYS       Blood Culture 1 [[id number]]  (Abnormal) Collected: 05/06/24 1800    Order Status: Completed Specimen: Blood Updated: 05/08/24 1356     Special Requests NO SPECIAL REQUESTS        Gram Stain       AEROBIC BOTTLE Gram positive cocci IN GROUPS                  SMEAR CALLED TO AND CORRECTLY REPEATED BY: RAQUEL GARCIA ICU ON [id number] AT 1605 HRS TO 4410.           Culture       STAPHYLOCOCCUS SPECIES, COAGULASE NEGATIVE GROWING IN 1 OF 2 BOTTLES DRAWN No Site Indicated          Culture, Blood, PCR ID Panel [[id number]]  (Abnormal) Collected: 05/06/24 1800    Order Status: Completed Specimen: Blood Updated: 05/07/24 1612     Accession Number [id number]     Enterococcus faecalis by PCR Not detected        Enterococcus faecium by PCR Not detected        Listeria monocytogenes by PCR Not detected        STAPHYLOCOCCUS Detected        Staphylococcus Aureus Not detected        Staphylococcus epidermidis by PCR Detected        Staphylococcus lugdunensis by PCR Not detected        STREPTOCOCCUS Not detected        Streptococcus agalactiae (Group B) Not detected        Strep pneumoniae Not detected        Strep pyogenes,(Grp. A) Not detected        Acinetobacter calcoac baumannii complex by PCR Not detected        Bacteroides fragilis by PCR Not detected        Enterobacteriaceae by PCR Not detected        Enterobacter cloacae complex by PCR Not detected        Escherichia Coli Not detected        Klebsiella aerogenes by PCR Not detected        Klebsiella oxytoca by PCR Not detected        Klebsiella pneumoniae group by PCR Not detected        Proteus by PCR Not detected        Salmonella species by PCR Not detected        Serratia marcescens by PCR Not detected        Haemophilus Influenzae by PCR Not detected        Neisseria meningitidis by PCR Not detected        Pseudomonas aeruginosa Not detected        Stenotrophomonas maltophilia by PCR Not detected        polyethylene glycol (GLYCOLAX) packet 17 g  17 g Per NG tube Daily PRN Trish Santos PA-C        budesonide (PULMICORT) nebulizer suspension 1 mg  1 mg Nebulization BID RT Remi Mendez MD   1 mg at 05/19/24 0802    chlorhexidine (PERIDEX) 0.12 % solution 15 mL  15 mL Mouth/Throat BID Armand Wilkerson MD   15 mL at 05/19/24 0848    ondansetron (ZOFRAN-ODT) disintegrating tablet 4 mg  4 mg Oral Q8H PRN Renetta Sanchez APRN - MANAN        Or    ondansetron (ZOFRAN) injection 4 mg  4 mg IntraVENous Q6H PRN Renetta Sanchez APRN - MANAN        acetaminophen (TYLENOL) tablet 650 mg  650 mg Oral Q6H PRN Renetta Sanchez APRN - NP   650 mg at 05/14/24 2005    Or    acetaminophen (TYLENOL) suppository 650 mg  650 mg Rectal Q6H PRN Renetta Sanchez APRN - NP        heparin (porcine) injection 5,000 Units  5,000 Units SubCUTAneous 3 times per day Renetta Sanchez APRN - NP   5,000 Units at 05/19/24 0619    ipratropium 0.5 mg-albuterol 2.5 mg (DUONEB) nebulizer solution 1 Dose  1 Dose Inhalation Q4H PRN Renetta Sanchez APRN - NP   1 Dose at 05/07/24 0725    thiamine (B-1) injection 100 mg  100 mg IntraVENous Daily Renetta Sanchez APRN - NP   100 mg at 05/19/24 0848    docusate (COLACE) 50 MG/5ML liquid 100 mg  100 mg Oral BID Renetta Sanchez APRN - NP   100 mg at 05/19/24 0848                 Imaging:  I have personally reviewed the patient’s radiographs and have reviewed the reports:    XR CHEST PORTABLE  Narrative: XR CHEST PORTABLE    Indication: Worsening respiratory status    Comparison: 5/14/2024    Findings: A weighted enteric tube courses below the diaphragm and into the  stomach.    The cardiomediastinal silhouette is stable.    Lung volumes are shallow but with slightly improved aeration in both lung bases.  No pleural effusion or pneumothorax.    The visualized osseous structures are unremarkable.  Impression: 1.  Shallow lung volumes but with slightly improving probable bibasilar  atelectasis.

## 2024-05-19 NOTE — PROGRESS NOTES
Pt trying to reach for his NGT and also pulled his EKG leads off his chest. Reposition pt and reinforce pt teaching r/t restraints.  1547pm O2 Sat-86. Reposition pt.  Increased Fio2 to 60%.   1800pm O2 sat's remain -92%. Right AC PIV infiltrated. Attempt x2-unsuccessful. #24 Left Hand PIV started.

## 2024-05-20 LAB
ANION GAP SERPL CALC-SCNC: 4 MMOL/L (ref 3–18)
BASOPHILS # BLD: 0.1 K/UL (ref 0–0.1)
BASOPHILS NFR BLD: 1 % (ref 0–2)
BUN SERPL-MCNC: 20 MG/DL (ref 7–18)
BUN/CREAT SERPL: 26 (ref 12–20)
CALCIUM SERPL-MCNC: 9.2 MG/DL (ref 8.5–10.1)
CHLORIDE SERPL-SCNC: 105 MMOL/L (ref 100–111)
CO2 SERPL-SCNC: 26 MMOL/L (ref 21–32)
CREAT SERPL-MCNC: 0.77 MG/DL (ref 0.6–1.3)
DIFFERENTIAL METHOD BLD: ABNORMAL
EOSINOPHIL # BLD: 0.2 K/UL (ref 0–0.4)
EOSINOPHIL NFR BLD: 3 % (ref 0–5)
ERYTHROCYTE [DISTWIDTH] IN BLOOD BY AUTOMATED COUNT: 13.5 % (ref 11.6–14.5)
GLUCOSE BLD STRIP.AUTO-MCNC: 176 MG/DL (ref 70–110)
GLUCOSE BLD STRIP.AUTO-MCNC: 188 MG/DL (ref 70–110)
GLUCOSE BLD STRIP.AUTO-MCNC: 191 MG/DL (ref 70–110)
GLUCOSE BLD STRIP.AUTO-MCNC: 199 MG/DL (ref 70–110)
GLUCOSE SERPL-MCNC: 197 MG/DL (ref 74–99)
HCT VFR BLD AUTO: 34.9 % (ref 36–48)
HGB BLD-MCNC: 11.3 G/DL (ref 13–16)
IMM GRANULOCYTES # BLD AUTO: 0 K/UL (ref 0–0.04)
IMM GRANULOCYTES NFR BLD AUTO: 0 % (ref 0–0.5)
LYMPHOCYTES # BLD: 1.5 K/UL (ref 0.9–3.6)
LYMPHOCYTES NFR BLD: 21 % (ref 21–52)
MAGNESIUM SERPL-MCNC: 2.1 MG/DL (ref 1.6–2.6)
MCH RBC QN AUTO: 30.3 PG (ref 24–34)
MCHC RBC AUTO-ENTMCNC: 32.4 G/DL (ref 31–37)
MCV RBC AUTO: 93.6 FL (ref 78–100)
MONOCYTES # BLD: 0.7 K/UL (ref 0.05–1.2)
MONOCYTES NFR BLD: 10 % (ref 3–10)
NEUTS SEG # BLD: 4.7 K/UL (ref 1.8–8)
NEUTS SEG NFR BLD: 65 % (ref 40–73)
NRBC # BLD: 0 K/UL (ref 0–0.01)
NRBC BLD-RTO: 0 PER 100 WBC
PHOSPHATE SERPL-MCNC: 3.9 MG/DL (ref 2.5–4.9)
PLATELET # BLD AUTO: 353 K/UL (ref 135–420)
PMV BLD AUTO: 10.9 FL (ref 9.2–11.8)
POTASSIUM SERPL-SCNC: 4.2 MMOL/L (ref 3.5–5.5)
RBC # BLD AUTO: 3.73 M/UL (ref 4.35–5.65)
SODIUM SERPL-SCNC: 135 MMOL/L (ref 136–145)
VANCOMYCIN SERPL-MCNC: 32.2 UG/ML (ref 5–40)
WBC # BLD AUTO: 7.2 K/UL (ref 4.6–13.2)

## 2024-05-20 PROCEDURE — 36415 COLL VENOUS BLD VENIPUNCTURE: CPT

## 2024-05-20 PROCEDURE — 94669 MECHANICAL CHEST WALL OSCILL: CPT

## 2024-05-20 PROCEDURE — 2700000000 HC OXYGEN THERAPY PER DAY

## 2024-05-20 PROCEDURE — 6360000002 HC RX W HCPCS: Performed by: NURSE PRACTITIONER

## 2024-05-20 PROCEDURE — 2580000003 HC RX 258: Performed by: INTERNAL MEDICINE

## 2024-05-20 PROCEDURE — 6360000002 HC RX W HCPCS: Performed by: INTERNAL MEDICINE

## 2024-05-20 PROCEDURE — 85025 COMPLETE CBC W/AUTO DIFF WBC: CPT

## 2024-05-20 PROCEDURE — 97535 SELF CARE MNGMENT TRAINING: CPT

## 2024-05-20 PROCEDURE — 99232 SBSQ HOSP IP/OBS MODERATE 35: CPT | Performed by: STUDENT IN AN ORGANIZED HEALTH CARE EDUCATION/TRAINING PROGRAM

## 2024-05-20 PROCEDURE — 83735 ASSAY OF MAGNESIUM: CPT

## 2024-05-20 PROCEDURE — 99233 SBSQ HOSP IP/OBS HIGH 50: CPT

## 2024-05-20 PROCEDURE — 2140000001 HC CVICU INTERMEDIATE R&B

## 2024-05-20 PROCEDURE — 6370000000 HC RX 637 (ALT 250 FOR IP): Performed by: EMERGENCY MEDICINE

## 2024-05-20 PROCEDURE — 82962 GLUCOSE BLOOD TEST: CPT

## 2024-05-20 PROCEDURE — 97164 PT RE-EVAL EST PLAN CARE: CPT

## 2024-05-20 PROCEDURE — 6370000000 HC RX 637 (ALT 250 FOR IP): Performed by: NURSE PRACTITIONER

## 2024-05-20 PROCEDURE — 6370000000 HC RX 637 (ALT 250 FOR IP): Performed by: INTERNAL MEDICINE

## 2024-05-20 PROCEDURE — 94761 N-INVAS EAR/PLS OXIMETRY MLT: CPT

## 2024-05-20 PROCEDURE — 94640 AIRWAY INHALATION TREATMENT: CPT

## 2024-05-20 PROCEDURE — 80048 BASIC METABOLIC PNL TOTAL CA: CPT

## 2024-05-20 PROCEDURE — 99233 SBSQ HOSP IP/OBS HIGH 50: CPT | Performed by: INTERNAL MEDICINE

## 2024-05-20 PROCEDURE — APPSS15 APP SPLIT SHARED TIME 0-15 MINUTES

## 2024-05-20 PROCEDURE — 84100 ASSAY OF PHOSPHORUS: CPT

## 2024-05-20 PROCEDURE — 80202 ASSAY OF VANCOMYCIN: CPT

## 2024-05-20 RX ORDER — SODIUM CHLORIDE FOR INHALATION 3 %
4 VIAL, NEBULIZER (ML) INHALATION
Status: DISCONTINUED | OUTPATIENT
Start: 2024-05-20 | End: 2024-05-23

## 2024-05-20 RX ORDER — IPRATROPIUM BROMIDE AND ALBUTEROL SULFATE 2.5; .5 MG/3ML; MG/3ML
1 SOLUTION RESPIRATORY (INHALATION)
Status: DISCONTINUED | OUTPATIENT
Start: 2024-05-20 | End: 2024-05-24 | Stop reason: HOSPADM

## 2024-05-20 RX ADMIN — THIAMINE HYDROCHLORIDE 100 MG: 100 INJECTION, SOLUTION INTRAMUSCULAR; INTRAVENOUS at 08:00

## 2024-05-20 RX ADMIN — VANCOMYCIN HYDROCHLORIDE 1000 MG: 1 INJECTION, POWDER, FOR SOLUTION INTRAVENOUS at 13:20

## 2024-05-20 RX ADMIN — BUDESONIDE 1 MG: 1 SUSPENSION RESPIRATORY (INHALATION) at 19:43

## 2024-05-20 RX ADMIN — ARFORMOTEROL TARTRATE 15 MCG: 15 SOLUTION RESPIRATORY (INHALATION) at 19:43

## 2024-05-20 RX ADMIN — CHLORHEXIDINE GLUCONATE 15 ML: 1.2 RINSE ORAL at 21:52

## 2024-05-20 RX ADMIN — HEPARIN SODIUM 5000 UNITS: 5000 INJECTION INTRAVENOUS; SUBCUTANEOUS at 13:17

## 2024-05-20 RX ADMIN — HEPARIN SODIUM 5000 UNITS: 5000 INJECTION INTRAVENOUS; SUBCUTANEOUS at 21:52

## 2024-05-20 RX ADMIN — BUDESONIDE 1 MG: 1 SUSPENSION RESPIRATORY (INHALATION) at 08:21

## 2024-05-20 RX ADMIN — DOCUSATE SODIUM LIQUID 100 MG: 100 LIQUID ORAL at 21:52

## 2024-05-20 RX ADMIN — VANCOMYCIN HYDROCHLORIDE 1000 MG: 1 INJECTION, POWDER, FOR SOLUTION INTRAVENOUS at 02:06

## 2024-05-20 RX ADMIN — CHLORHEXIDINE GLUCONATE 15 ML: 1.2 RINSE ORAL at 07:59

## 2024-05-20 RX ADMIN — DOCUSATE SODIUM LIQUID 100 MG: 100 LIQUID ORAL at 08:00

## 2024-05-20 RX ADMIN — SODIUM CHLORIDE SOLN NEBU 3% 4 ML: 3 NEBU SOLN at 23:32

## 2024-05-20 RX ADMIN — Medication 15 ML: at 07:59

## 2024-05-20 RX ADMIN — ARFORMOTEROL TARTRATE 15 MCG: 15 SOLUTION RESPIRATORY (INHALATION) at 08:21

## 2024-05-20 RX ADMIN — HEPARIN SODIUM 5000 UNITS: 5000 INJECTION INTRAVENOUS; SUBCUTANEOUS at 06:25

## 2024-05-20 RX ADMIN — IPRATROPIUM BROMIDE AND ALBUTEROL SULFATE 1 DOSE: .5; 3 SOLUTION RESPIRATORY (INHALATION) at 23:34

## 2024-05-20 ASSESSMENT — PAIN SCALES - GENERAL
PAINLEVEL_OUTOF10: 0

## 2024-05-20 ASSESSMENT — PAIN SCALES - WONG BAKER
WONGBAKER_NUMERICALRESPONSE: NO HURT
WONGBAKER_NUMERICALRESPONSE: NO HURT

## 2024-05-20 NOTE — PROGRESS NOTES
I have reevaluated the patient after initiation of intervention.  The patient is comfortable, uninjured, but continues to pose an imminent risk of injury to self and or serious disruption of treatment.  The patient's current medical and behavioral conditions that warrant the use intervention include Poor safety judgment:  Cognitive loss and an unreliable and Pulling out devices:  Pulling tubes. Restraint or seclusion will be discontinued at the earliest possible time, regardless of the scheduled expiration of the order.

## 2024-05-20 NOTE — PROGRESS NOTES
Palliative Medicine   [facility] [phone]  [facility] [phone]    CODE STATUS:  FULL CODE / FULL AGGRESSIVE MEASURES     AMD STATUS: Aimee Crump, child, is Primary MPoA, per document on file.     Palliative Care team, Mera Orourke NP and this LMSW met with pt at bedside for follow up assessment. Upon entry, pt laying in bed with head elevated, pt sleeping, receiving high flow O2 via nasal canula. Pt opened eyes once to verbal and gentle tactile stimuli. Per RN, pt will wake up, is confused but redirectable.  Informed RN of planned meetin at 1200 hours to discuss plans for pt's care, moving forward.     1200 hr. Palliative team received call from pt's daughter, Aimee Crump, to initiate family meeting. Mera Orourke NP and this SW spoke with Aimee, her brother and her sister, Susan.  Aimee was afforded opportunity to discuss the family's understanding of pt's condition and care at this time. Palliative team addressed worries about pt's condition and lack of recovery had hoped for, to include somnolence, continued need for high flow O2, and temporary feeding tube.  NP addressed specifics about these issues and difference between palliative care and hospice care. Explanation about limitation of how much O2 a patient can have in outpatient setting, and need for permanent feeding tube, if artificial nutrition were to be continued.  Recommendations to not utilize permanent feeding tubes in patients with dementia and fact that even if it's wanted, providers can assess for, but may not mack to place one.      Discussion about transitioning pt to comfort and hospice discussed. Family informed pt can be transitioned to comfort in hospital, but will transition to hospice at [place] either at home or in community.  Pt's family notified pt likely does qualify for hospice, due to limited life expectancy.      Pt's family receptive to information provided, requested

## 2024-05-20 NOTE — PLAN OF CARE
Problem: Physical Therapy - Adult  Goal: By Discharge: Performs mobility at highest level of function for planned discharge setting.  See evaluation for individualized goals.  Description: Physical Therapy Goals  Initiated 5/13/2024 and to be accomplished within 7 day(s)  1.  Patient will follow 75% of commands.   2.  Patient will roll side to side in bed with moderate assistance.    3.  Patient will move from supine to sit and sit to supine  in bed with moderate assistance.    4.  Patient will transfer from bed to chair and chair to bed with moderate assistance  using the least restrictive device.  5.  Patient will demonstrate seated balance at edge of bed for 8 minutes with supervision.   6.  Patient will perform sit to stand with moderate assistance.  7.  Patient will ambulate with moderate assistance for 10 feet with the least restrictive device.     PLOF: Lives with daughter. First floor set-up of two story home. 2 steps to enter. Amb with cane. Attends [facility] Mon-Fri.   Outcome: Completed   PHYSICAL THERAPY RE-EVALUATION/DISCHARGE    Patient: Paul Bobby (83 y.o. male)  Date: 5/20/2024  Primary Diagnosis: Acute respiratory failure (HCC) [J96.00]  Altered mental status, unspecified altered mental status type [R41.82]  Acute hypoxemic respiratory failure (HCC) [J96.01]  AMS (altered mental status) [R41.82]       Precautions: Fall Risk, General Precautions  ASSESSMENT AND RECOMMENDATIONS:  Re-evaluation s/p prolonged admission; goals reviewed. Pt was alert but unable to confirm name, place, time, or situation. Max A x2 bed mobility to reposition pt in bed. Restless with UE nadia attempting to pull IV and trejo, bilateral restraints intact at end of session. Spontaneous movements to reposition, however no purposeful movement noted in LE. LE PROM limited bilaterally. R LE internally rotated. Pt responded appropriately to noxious stimuli in distal LE. Pt left in supine with HOB elevated to 30 degrees. Call bell  Nursing notified  []         Caregiver present  []         Bed alarm activated  []         Chair alarm activated  []         SCDs applied    COMMUNICATION/EDUCATION:   Patient Education  Education Given To: Patient  Education Provided: Role of Therapy;Plan of Care  Education Method: Demonstration;Verbal;Teach Back  Barriers to Learning: Cognition  Education Outcome: Unable to verbalize;Unable to demonstrate understanding    Thank you for this referral.  Jelly Alvarez, SPT  Minutes: 12

## 2024-05-20 NOTE — PROGRESS NOTES
[facility]  Pulmonary, Critical Care, and Sleep Medicine    Name: Paul Bobby MRN: [id number]   : 1940 Hospital: [facility]   Date: 2024        Pulmonary Medicine: Daily Progress Note    Admission Date:   2024  LOS: 14  MAR reviewed and pertinent medications noted or modified as needed    IMPRESSION:   Acute Hypoxic Respiratory Failure- requiring mechanical ventilator, in the setting multifocal PNA and parainfluenza 3, extubated  to HHFNC  Aspiration PNA, resp. Cx + for  Klebsiella aerogenes and MRSA (05/10)  Parainfluenza 3 ()  Acute Encephalopathy-toxic/metabolic in nature superimposed on dementia, resolved back to baseline   Metabolic acidosis, lactic acidosis, resolved   ESTRELLA- prerenal azotemia vs. Ishemic ATN, resolved   Septic shock vs. hypovolemic- in the setting of multifocal PNA with partial b/l LL collapse and consildation/Aspiration PNA. Shock resolved  Chronic bronchitis and emhpysema with distal mucoid impaction, s/p multiple bronchs   COPD- not in acute exacerbation   Moderate colonic stool burden with large rectal stool ball   Cholelithiasis without evidence of acute cholecystitis   DM II   HLD  BMI Body mass index is 23.79 kg/m².  CODE STATUS: Full Code No additional code details       Patient Active Problem List   Diagnosis    Weakness    Syncope    Dementia (HCC)    AMS (altered mental status)    Acute respiratory failure (HCC)    Aspiration pneumonia of both lungs due to vomit (HCC)    Aspiration of food    Mucus plugging of bronchi    Acute encephalopathy    Acute hypoxemic respiratory failure (HCC)    ESTRELLA (acute kidney injury) (HCC)    Septic shock (HCC)    Lactic acidosis    Multifocal pneumonia    Metabolic acidosis    Palliative care encounter    Goals of care, counseling/discussion    DNR (do not resuscitate) discussion    Aspiration into airway    Parainfluenza    Chronic obstructive pulmonary disease (HCC)    Encounter for

## 2024-05-20 NOTE — PROGRESS NOTES
Verbal shift change report given to Alon RN (oncoming nurse) by Prem RN (offgoing nurse). Report included the following information Nurse Handoff Report, Index, MAR, Recent Results, and Cardiac Rhythm SR .

## 2024-05-20 NOTE — PLAN OF CARE
Problem: Occupational Therapy - Adult  Goal: By Discharge: Performs self-care activities at highest level of function for planned discharge setting.  See evaluation for individualized goals.  Description: Occupational Therapy Goals:  Initiated 5/14/2024 to be met within 7-10 days.    1.  Patient will perform grooming with minimal assistance/contact guard assist.   2.  Patient will perform upper body dressing with minimal assistance/contact guard assist.  3.  Patient will perform bed mobility with minimal assistance in preparation for seated ADL tasks.   4.  Patient will perform toilet transfers with minimal assistance/contact guard assist.  5.  Patient will perform all aspects of toileting with minimal assistance/contact guard assist.  6.  Patient will participate in upper extremity therapeutic exercise/activities with minimal assistance/contact guard assist for 8-10 minutes to increase strength/endurance for ADLs.    7.  Patient will utilize energy conservation techniques during functional activities with verbal cues.    PLOF: PLOF obtained from chart review. Pt lives w/ family in a two level house, able to live on main level, walk in shower. Pt has cane. Unsure how much assistance pt needed w/ ADLs and mobility.     Outcome: Progressing   OCCUPATIONAL THERAPY TREATMENT    Patient: Paul Bobby (83 y.o. male)  Date: 5/20/2024  Diagnosis: Acute respiratory failure (HCC) [J96.00]  Altered mental status, unspecified altered mental status type [R41.82]  Acute hypoxemic respiratory failure (HCC) [J96.01]  AMS (altered mental status) [R41.82] AMS (altered mental status)      Precautions: General Precautions, Fall Risk    Chart, occupational therapy assessment, plan of care, and goals were reviewed.  ASSESSMENT:  Pt now out of ICU, on CVT step-down unit. Remains on HFNC 20L FiO2 50%. Oriented to self and place. Continues w/intermittent command following for UE Therex. Pt seen at bed level for UE Therex and simple  chair  [x]  Patient left in no apparent distress in bed  []  Call bell left within reach  [x]  Nursing notified  []  Caregiver present  []  Bed alarm activated    COMMUNICATION/EDUCATION:   Patient Education  Education Given To: Patient  Education Provided: Plan of Care  Education Method: Verbal;Teach Back  Barriers to Learning: Cognition  Education Outcome: Continued education needed    Thank you for this referral.  LUKE Carroll  Minutes: 17

## 2024-05-20 NOTE — PROGRESS NOTES
[facility]  Progress Note    Patient: Paul Bobby Age: 83 y.o. : 1940 MR#: [id number] SSN: [id number]  Date: 2024         Assessment/Plan:     Acute Hypoxic Respiratory Failure- requiring mechanical ventilator, in the setting aspiration PNA and parainfluenza 3, extubated  to HFNC   Appreciate pulm assistance  Wean O2 as tolerated, RT to follow  CPT, saline nebs, metaneb per pulm  Aspiration PNA; resp culture 5/10 +MRSA, klebsiella aerogenes  Aspiration precautions ordered  NPO per SLP, receiving TF through [facility]bhoff which he has tried to pull out on a few occasions. Not a good candidate for PEG.   IV abx: vanc. Completed zosyn   Parainfluenza 3   Acute Encephalopathy-toxic/metabolic in nature superimposed on dementia, improving   Metabolic acidosis, lactic acidosis, resolved   ESTRELLA- prerenal azotemia vs. Ishemic ATN, resolved   Septic shock vs. hypovolemic- in the setting of multifocal PNA with partial b/l LL collapse and consildation/Aspiration PNA. Shock resolved  Chronic bronchitis and emphysema: with distal mucoid impaction, s/p multiple bronchs. Not in acute exacerbation   Moderate colonic stool burden  Colace, PRN miralax  Cholelithiasis: no evidence of acute cholecystitis   Hx DM II   Hx HLD   Dementia  Palliative care following  Fall precautions      I have evaluated the patient after initiation of intervention.  The patient is comfortable, uninjured, but continues to pose an imminent risk of injury to self and or serious disruption of treatment.  The patient's current medical and behavioral conditions that warrant the use intervention include danger to self and Interference with medical treatment. Restraint or seclusion will be discontinued at the earliest possible time, regardless of the scheduled expiration of the order.      DVT Prophylaxis:  []Lovenox  [x]Hep SQ  []SCDs  []Coumadin   []On Heparin gtt []PO anticoagulant    Anticipated

## 2024-05-20 NOTE — PLAN OF CARE
Problem: Safety - Adult  Goal: Free from fall injury  Outcome: Progressing     Problem: Skin/Tissue Integrity  Goal: Absence of new skin breakdown  Description: 1.  Monitor for areas of redness and/or skin breakdown  2.  Assess vascular access sites hourly  3.  Every 4-6 hours minimum:  Change oxygen saturation probe site  4.  Every 4-6 hours:  If on nasal continuous positive airway pressure, respiratory therapy assess nares and determine need for appliance change or resting period.  Outcome: Progressing     Problem: Safety - Medical Restraint  Goal: Remains free of injury from restraints (Restraint for Interference with Medical Device)  Description: INTERVENTIONS:  1. Determine that other, less restrictive measures have been tried or would not be effective before applying the restraint  2. Evaluate the patient's condition at the time of restraint application  3. Inform patient/family regarding the reason for restraint  4. Q2H: Monitor safety, psychosocial status, comfort, nutrition and hydration  Outcome: Progressing  Flowsheets  Taken 5/20/2024 0800 by Prem Monsalve RN  Remains free of injury from restraints (restraint for interference with medical device):   Determine that other, less restrictive measures have been tried or would not be effective before applying the restraint   Every 2 hours: Monitor safety, psychosocial status, comfort, nutrition and hydration   Inform patient/family regarding the reason for restraint   Evaluate the patient's condition at the time of restraint application  Taken 5/20/2024 0600 by HandJanessa RN  Remains free of injury from restraints (restraint for interference with medical device): Every 2 hours: Monitor safety, psychosocial status, comfort, nutrition and hydration  Taken 5/20/2024 0400 by Janessa Zuñiga RN  Remains free of injury from restraints (restraint for interference with medical device): Every 2 hours: Monitor safety, psychosocial status, comfort, nutrition and

## 2024-05-20 NOTE — PROGRESS NOTES
Pt is currently on HHFNC and tolerating well. Saturations remain in th lower 90s will not decrease Fi02 at this time. Will continue to monitor and assess respiratory vitals.    05/20/24 1945   Oxygen Therapy/Pulse Ox   O2 Therapy Oxygen humidified   O2 Device Heated high flow cannula   O2 Flow Rate (L/min) 20 L/min   FiO2  50 %   Pulse 81   Respirations 20   SpO2 91 %   Skin Assessment Clean, dry, & intact   Skin Protection for O2 Device Yes   Location Nose   Ventilator ID [id number]  (heated high flow nasal cannula)   Humidification Temp 33   Humidification Temp Measured 33   Circuit Condensation Drained

## 2024-05-20 NOTE — PROGRESS NOTES
Palliative Medicine Follow Up  Patient Name: Paul Bobby  YOB: 1940  MRN: [id number]  Age: 83 y.o.  Gender: male    Date of Initial Consult: 5/7/2024  Date of Service: 5/20/2024  Time: 10:48 AM  Provider: MARTHA Pennington  Hospital Day: 15  Admit Date: 5/6/2024  Referring Provider: Renetta Sanchez NP      Reasons for Consultation:  Goals of Care/ACP/symptom management    HISTORY OF PRESENT ILLNESS (HPI):   Paul Bobby is a 83 y.o. male with a past medical history of COPD, diabetes, hyperlipidemia, and dementia, who was admitted on 5/6/2024 from [place] [facility]/[place] with a diagnosis of acute hypoxic respiratory failure, aspiration pneumonia in setting of parainfluenza 3.  Patient was initially brought to emergency room for further evaluation of persistent [facility] and [facility].  Initially patient was placed on NRB followed by BiPAP due to acute hypoxic respiratory failure.  Due to patient's mental function and an episode of vomiting while on BiPAP, patient was subsequently intubated and transferred to ICU. Palliative care was consulted to assist in goals of care.    5/20/2024 Patient transferred to CVT stepdown, room across from nurses' station. Patient with eyes closed, did not open eyes to voice. Restraint on right wrist. No family in room. Planned meeting today at 12 noon with daughter.    (Notes from previous encounters)    5/17/2024  Patient  on HFNC 40L and 45% FIO2 He is receiving tube feeds via SBFT in nare. Looks ill and weaker today. Barely opens eyes to voice. Restraint on right wrist. Nurse in room. Patient has been downgraded to intermediate care but remains in ICU unit.    5/15/2024 Patient seen with Lana AGUERO. Update received from MANAN Sanchez on patient who states he is high risk for aspirating again, he has not passed his swallow evaluation and may need a PEG tube versus comfort feeds. Patient seen resting with eyes closed, opened eyes to  pain  Dyspnea Score: 4    Clinical Pain Assessment (nonverbal scale for severity on nonverbal patients):   Clinical Pain Assessment  Severity: 0       NVPS:  Adult Nonverbal Pain Scale (NVPS)  Face: No particular expression or smile  Activity (Movement): Laid quietly, normal position  Guarding: Lying quietly, no positioning of hands over areas of bod  Physiology (Vital Signs): Stable vital signs  Respiratory: Baseline RR/SpO2 compliant with ventilator  NVPS Score : 0    RDOS:  RDOS  Heart rate per minute: less than 90  Respiratory rate per minute: less than 19  Restlessness:non-purposeful: None  Paradoxical breathing pattern:abdomen moves in on inspiration: None  Accessory muscle use: rise in clavicle during inspiration: None  Grunting at end-expiration: guttural sound: None  Nasal flaring: involuntary movement of nares: None  Look of fear: None  Total : 0      Vital Signs: Blood pressure (!) 154/74, pulse 88, temperature 98.4 °F (36.9 °C), temperature source Axillary, resp. rate 18, height 1.778 m (5' 10\"), weight 75.2 kg (165 lb 12.6 oz), SpO2 94 %.    PHYSICAL ASSESSMENT:   General: [] Oriented x3  [] Well appearing  [] Intubated  [x]Ill appearing  []Other:  Mental Status: [] Normal mental status exam  [x] Drowsy  [x] Confused  [x]Other: kept eyes closed  Cardiovascular: [x] Regular rate/rhythm  [] Arrhythmia  [] Other:   Chest: [] Effort normal  []Lungs clear  [] Respiratory distress  [] Tachypnea  [x] Other: on HFNC, no signs of distress   Abdomen: [] Soft/non-tender  [x] Normal appearance  [] Distended  [] Ascites  [] Other:  Neurological: [] Normal speech  [] Normal sensation  [x]Deficits present: appears too weak to converse  Extremity: [] Normal skin color/temp  [] Clubbing/cyanosis  [] No edema  [x] Other:  Edematous in extremities  Wt Readings from Last 15 Encounters:   05/17/24 75.2 kg (165 lb 12.6 oz)   12/01/22 78.9 kg (174 lb)   11/03/22 79 kg (174 lb 2.6 oz)   09/08/22 68.5 kg (151 lb)   05/12/22

## 2024-05-21 PROBLEM — R13.12 OROPHARYNGEAL DYSPHAGIA: Status: ACTIVE | Noted: 2024-05-21

## 2024-05-21 PROBLEM — Z78.9 POOR PROGNOSIS: Status: ACTIVE | Noted: 2024-05-21

## 2024-05-21 PROBLEM — Z71.89 ENCOUNTER FOR HOSPICE CARE DISCUSSION: Status: ACTIVE | Noted: 2024-05-15

## 2024-05-21 LAB
ANION GAP SERPL CALC-SCNC: 4 MMOL/L (ref 3–18)
BASOPHILS # BLD: 0.1 K/UL (ref 0–0.1)
BASOPHILS NFR BLD: 1 % (ref 0–2)
BUN SERPL-MCNC: 21 MG/DL (ref 7–18)
BUN/CREAT SERPL: 26 (ref 12–20)
CALCIUM SERPL-MCNC: 9 MG/DL (ref 8.5–10.1)
CHLORIDE SERPL-SCNC: 105 MMOL/L (ref 100–111)
CO2 SERPL-SCNC: 28 MMOL/L (ref 21–32)
CREAT SERPL-MCNC: 0.8 MG/DL (ref 0.6–1.3)
DIFFERENTIAL METHOD BLD: ABNORMAL
EOSINOPHIL # BLD: 0.1 K/UL (ref 0–0.4)
EOSINOPHIL NFR BLD: 2 % (ref 0–5)
ERYTHROCYTE [DISTWIDTH] IN BLOOD BY AUTOMATED COUNT: 13.4 % (ref 11.6–14.5)
GLUCOSE BLD STRIP.AUTO-MCNC: 186 MG/DL (ref 70–110)
GLUCOSE BLD STRIP.AUTO-MCNC: 194 MG/DL (ref 70–110)
GLUCOSE BLD STRIP.AUTO-MCNC: 195 MG/DL (ref 70–110)
GLUCOSE BLD STRIP.AUTO-MCNC: 212 MG/DL (ref 70–110)
GLUCOSE SERPL-MCNC: 179 MG/DL (ref 74–99)
HCT VFR BLD AUTO: 34.2 % (ref 36–48)
HGB BLD-MCNC: 10.8 G/DL (ref 13–16)
IMM GRANULOCYTES # BLD AUTO: 0 K/UL (ref 0–0.04)
IMM GRANULOCYTES NFR BLD AUTO: 0 % (ref 0–0.5)
LYMPHOCYTES # BLD: 1.8 K/UL (ref 0.9–3.6)
LYMPHOCYTES NFR BLD: 28 % (ref 21–52)
MAGNESIUM SERPL-MCNC: 2.2 MG/DL (ref 1.6–2.6)
MCH RBC QN AUTO: 29.9 PG (ref 24–34)
MCHC RBC AUTO-ENTMCNC: 31.6 G/DL (ref 31–37)
MCV RBC AUTO: 94.7 FL (ref 78–100)
MONOCYTES # BLD: 0.7 K/UL (ref 0.05–1.2)
MONOCYTES NFR BLD: 11 % (ref 3–10)
NEUTS SEG # BLD: 3.5 K/UL (ref 1.8–8)
NEUTS SEG NFR BLD: 57 % (ref 40–73)
NRBC # BLD: 0 K/UL (ref 0–0.01)
NRBC BLD-RTO: 0 PER 100 WBC
PHOSPHATE SERPL-MCNC: 3.8 MG/DL (ref 2.5–4.9)
PLATELET # BLD AUTO: 364 K/UL (ref 135–420)
PMV BLD AUTO: 11.1 FL (ref 9.2–11.8)
POTASSIUM SERPL-SCNC: 4.3 MMOL/L (ref 3.5–5.5)
RBC # BLD AUTO: 3.61 M/UL (ref 4.35–5.65)
SODIUM SERPL-SCNC: 137 MMOL/L (ref 136–145)
WBC # BLD AUTO: 6.2 K/UL (ref 4.6–13.2)

## 2024-05-21 PROCEDURE — 99233 SBSQ HOSP IP/OBS HIGH 50: CPT | Performed by: INTERNAL MEDICINE

## 2024-05-21 PROCEDURE — 6370000000 HC RX 637 (ALT 250 FOR IP): Performed by: INTERNAL MEDICINE

## 2024-05-21 PROCEDURE — 2580000003 HC RX 258: Performed by: INTERNAL MEDICINE

## 2024-05-21 PROCEDURE — 94668 MNPJ CHEST WALL SBSQ: CPT

## 2024-05-21 PROCEDURE — 92526 ORAL FUNCTION THERAPY: CPT

## 2024-05-21 PROCEDURE — 2700000000 HC OXYGEN THERAPY PER DAY

## 2024-05-21 PROCEDURE — 6360000002 HC RX W HCPCS: Performed by: INTERNAL MEDICINE

## 2024-05-21 PROCEDURE — 94761 N-INVAS EAR/PLS OXIMETRY MLT: CPT

## 2024-05-21 PROCEDURE — 6370000000 HC RX 637 (ALT 250 FOR IP): Performed by: NURSE PRACTITIONER

## 2024-05-21 PROCEDURE — 36415 COLL VENOUS BLD VENIPUNCTURE: CPT

## 2024-05-21 PROCEDURE — 6370000000 HC RX 637 (ALT 250 FOR IP): Performed by: EMERGENCY MEDICINE

## 2024-05-21 PROCEDURE — 94640 AIRWAY INHALATION TREATMENT: CPT

## 2024-05-21 PROCEDURE — 84100 ASSAY OF PHOSPHORUS: CPT

## 2024-05-21 PROCEDURE — 83735 ASSAY OF MAGNESIUM: CPT

## 2024-05-21 PROCEDURE — 82962 GLUCOSE BLOOD TEST: CPT

## 2024-05-21 PROCEDURE — 80048 BASIC METABOLIC PNL TOTAL CA: CPT

## 2024-05-21 PROCEDURE — 99232 SBSQ HOSP IP/OBS MODERATE 35: CPT | Performed by: STUDENT IN AN ORGANIZED HEALTH CARE EDUCATION/TRAINING PROGRAM

## 2024-05-21 PROCEDURE — 85025 COMPLETE CBC W/AUTO DIFF WBC: CPT

## 2024-05-21 PROCEDURE — 97168 OT RE-EVAL EST PLAN CARE: CPT

## 2024-05-21 PROCEDURE — 6360000002 HC RX W HCPCS: Performed by: NURSE PRACTITIONER

## 2024-05-21 PROCEDURE — APPSS15 APP SPLIT SHARED TIME 0-15 MINUTES

## 2024-05-21 PROCEDURE — 2140000001 HC CVICU INTERMEDIATE R&B

## 2024-05-21 RX ADMIN — HEPARIN SODIUM 5000 UNITS: 5000 INJECTION INTRAVENOUS; SUBCUTANEOUS at 21:37

## 2024-05-21 RX ADMIN — IPRATROPIUM BROMIDE AND ALBUTEROL SULFATE 1 DOSE: .5; 3 SOLUTION RESPIRATORY (INHALATION) at 03:55

## 2024-05-21 RX ADMIN — DOCUSATE SODIUM LIQUID 100 MG: 100 LIQUID ORAL at 21:33

## 2024-05-21 RX ADMIN — SODIUM CHLORIDE SOLN NEBU 3% 4 ML: 3 NEBU SOLN at 07:38

## 2024-05-21 RX ADMIN — ARFORMOTEROL TARTRATE 15 MCG: 15 SOLUTION RESPIRATORY (INHALATION) at 07:38

## 2024-05-21 RX ADMIN — IPRATROPIUM BROMIDE AND ALBUTEROL SULFATE 1 DOSE: .5; 3 SOLUTION RESPIRATORY (INHALATION) at 12:07

## 2024-05-21 RX ADMIN — IPRATROPIUM BROMIDE AND ALBUTEROL SULFATE 1 DOSE: .5; 3 SOLUTION RESPIRATORY (INHALATION) at 23:34

## 2024-05-21 RX ADMIN — DOCUSATE SODIUM LIQUID 100 MG: 100 LIQUID ORAL at 09:21

## 2024-05-21 RX ADMIN — BUDESONIDE 1 MG: 1 SUSPENSION RESPIRATORY (INHALATION) at 07:38

## 2024-05-21 RX ADMIN — THIAMINE HYDROCHLORIDE 100 MG: 100 INJECTION, SOLUTION INTRAMUSCULAR; INTRAVENOUS at 09:20

## 2024-05-21 RX ADMIN — IPRATROPIUM BROMIDE AND ALBUTEROL SULFATE 1 DOSE: .5; 3 SOLUTION RESPIRATORY (INHALATION) at 07:38

## 2024-05-21 RX ADMIN — CHLORHEXIDINE GLUCONATE 15 ML: 1.2 RINSE ORAL at 09:21

## 2024-05-21 RX ADMIN — HEPARIN SODIUM 5000 UNITS: 5000 INJECTION INTRAVENOUS; SUBCUTANEOUS at 15:47

## 2024-05-21 RX ADMIN — SODIUM CHLORIDE SOLN NEBU 3% 4 ML: 3 NEBU SOLN at 12:07

## 2024-05-21 RX ADMIN — SODIUM CHLORIDE SOLN NEBU 3% 4 ML: 3 NEBU SOLN at 23:34

## 2024-05-21 RX ADMIN — BUDESONIDE 1 MG: 1 SUSPENSION RESPIRATORY (INHALATION) at 20:39

## 2024-05-21 RX ADMIN — Medication 15 ML: at 09:21

## 2024-05-21 RX ADMIN — SODIUM CHLORIDE SOLN NEBU 3% 4 ML: 3 NEBU SOLN at 16:04

## 2024-05-21 RX ADMIN — SODIUM CHLORIDE SOLN NEBU 3% 4 ML: 3 NEBU SOLN at 03:56

## 2024-05-21 RX ADMIN — SODIUM CHLORIDE SOLN NEBU 3% 4 ML: 3 NEBU SOLN at 20:39

## 2024-05-21 RX ADMIN — IPRATROPIUM BROMIDE AND ALBUTEROL SULFATE 1 DOSE: .5; 3 SOLUTION RESPIRATORY (INHALATION) at 20:39

## 2024-05-21 RX ADMIN — CHLORHEXIDINE GLUCONATE 15 ML: 1.2 RINSE ORAL at 21:33

## 2024-05-21 RX ADMIN — HEPARIN SODIUM 5000 UNITS: 5000 INJECTION INTRAVENOUS; SUBCUTANEOUS at 06:58

## 2024-05-21 RX ADMIN — IPRATROPIUM BROMIDE AND ALBUTEROL SULFATE 1 DOSE: .5; 3 SOLUTION RESPIRATORY (INHALATION) at 16:04

## 2024-05-21 RX ADMIN — ARFORMOTEROL TARTRATE 15 MCG: 15 SOLUTION RESPIRATORY (INHALATION) at 20:39

## 2024-05-21 RX ADMIN — VANCOMYCIN HYDROCHLORIDE 1000 MG: 1 INJECTION, POWDER, FOR SOLUTION INTRAVENOUS at 03:18

## 2024-05-21 ASSESSMENT — PAIN SCALES - WONG BAKER: WONGBAKER_NUMERICALRESPONSE: NO HURT

## 2024-05-21 ASSESSMENT — PAIN SCALES - GENERAL: PAINLEVEL_OUTOF10: 0

## 2024-05-21 NOTE — PALLIATIVE CARE
1320 Daughter of patient on way to see her father. Palliative to meet with her in patient's room to continue goals of care discussion      OBED Coronado, FNP-C  Palliative Medicine  [facility], [phone]  [facility], [phone]  Available by text on [employer].

## 2024-05-21 NOTE — PLAN OF CARE
Problem: SLP Adult - Impaired Swallowing  Goal: By Discharge: Advance to least restrictive diet without signs or symptoms of aspiration for planned discharge setting.  See evaluation for individualized goals.  Description: Patient will:  1. Tolerate PO trials with 0 s/s overt distress in 4/5 trials  2. Utilize compensatory swallow strategies/maneuvers (decrease bite/sip, size/rate, alt. liq/sol) with min cues in 4/5 trials  3. Perform oral-motor/laryngeal exercises to increase oropharyngeal swallow function with min cues  4. Complete an objective swallow study (i.e., MBSS) to assess swallow integrity, r/o aspiration, and determine of safest LRD, min A    Recommend:   NPO with alternate means of nutrition/hydration vs comfort feeds   Strict aspiration precautions (HOB >30 degrees at all times, Oral care TID)    Outcome: Progressing   SPEECH LANGUAGE PATHOLOGY DYSPHAGIA TREATMENT    Patient: Paul Bobby (83 y.o. male)  Date: 5/21/2024  Diagnosis: Acute respiratory failure (HCC) [J96.00]  Altered mental status, unspecified altered mental status type [R41.82]  Acute hypoxemic respiratory failure (HCC) [J96.01]  AMS (altered mental status) [R41.82] AMS (altered mental status)      Precautions: Standard, aspiration  PLOF: As per H&P      ASSESSMENT:  Pt seen for follow up dysphagia management. Pt on HFNC with dobhoff still in place and would open eyes with verbal/tactile cues. Pt orally defensive on this date and uncooperative with PO trials. Max encouragement provided, however Pt refused bolus acceptance. Provided education on importance of swallow stim/trials with no evidence of learning. Pt displayed cough x1 likely due to poor management of secretions. Pt continues to be a high risk of aspiration and unsafe for nutrition/hydration PO. Per chart review, palliative seeing Pt and discussing plan of care with family. Recommend continuation of NPO with dobhoff, aspiration precautions, oral care TID, and meds via  dobhoff. Will continue to follow.    Progression toward goals:  []         Improving appropriately and progressing toward goals  []         Improving slowly and progressing toward goals  [x]         Not making progress toward goals and plan of care will be adjusted     PLAN:  Recommendations and Planned Interventions:  Recommendations  Requires SLP Intervention: Yes  Recommendations: NPO;Dysphagia treatment;Consider ice chips PRN  D/C Recommendations: Ongoing speech therapy is recommended during this hospitalization  Diet Solids Recommendation: NPO  Liquid Consistency Recommendation: NPO  Recommended Form of Meds: Via alternative means of nutrition  Therapeutic Interventions: Therapeutic PO trials with SLP;Oral care;Patient/Family education  Patient Education: Unable to provided education on aspiration precautions  Patient Education Response: No evidence of learning    Patient continues to benefit from skilled intervention to address the above impairments. Continue treatment per established plan of care.    Frequency/Duration: Patient will be followed by speech-language pathology 1-2 times per day/3-5 days per week to address goals.    Discharge Recommendations: D/C Recommendations: Ongoing speech therapy is recommended during this hospitalization     SUBJECTIVE:   Patient stated \"what are you doing with that\".  OBJECTIVE:   Daily Assessment:  Baseline Assessment  Temperature Spikes Noted: No  O2 Device: High flow nasal cannula  Liters of Oxygen:  (20L 30% FiO2)  Communication Observation: Non-verbal  Follows Directions: None  Current Diet : NPO  Current Liquid Diet : NPO  Dentition: Edentulous  Patient Positioning: Upright in bed  Baseline Vocal Quality: Aphonic  Volitional Cough: Weak    Orientation:  Person    Dysphagia Treatment:    Oral Assessment:  Oral Motor   Labial: No impairment  Dentition: Edentulous  Oral Hygiene: Moist, Clean  Lingual: Decreased rate, Decreased strength  Velum: No Impairment  Mandible:

## 2024-05-21 NOTE — PROGRESS NOTES
[facility]   Pharmacy Pharmacokinetic Monitoring Service - Vancomycin    Indication: sepsis  Goal AUC/TWILA: 400-600  Day of Therapy: 16  Additional Antimicrobials: none    Pertinent Laboratory Values:   Wt Readings from Last 1 Encounters:   05/17/24 75.2 kg (165 lb 12.6 oz)     Temp Readings from Last 1 Encounters:   05/21/24 98.5 °F (36.9 °C) (Axillary)     Estimated Creatinine Clearance: 72 mL/min (based on SCr of 0.8 mg/dL).    Recent Labs     05/20/24  0535 05/21/24  0441   CREATININE 0.77 0.80   BUN 20* 21*   WBC 7.2 6.2     Pertinent Cultures:  Date Source Results   5/6 blood CoNS in 1/2 5/7 ET aspirate MRSA, yeast   5/7 ET aspirate MRSA, yeast   5/8 blood NGF   5/8 BAL Light vikas   5/10 BAL MRSA, Kleb   MRSA Nasal Swab: present    Assessment:  Date Current Dose Level (mg/L) Timing of Level (h) AUC/TWILA   5/6 1,750 mg x1 - - -   5/7 - - - -   5/8 1,500 mg x1 5.4 32 NA   5/9 1,250 mg x1 10.9 17 NA   5/10 1,500 mg x1 15.6 15 NA   5/11 1,250 mg q18h 12.4 16 532   5/12 1,250 mg q18h - - -   5/13 1,250 mg q18h - - -   5/14 1,250 mg q18h - - -   5/15 1,250 mg q18h - - -   5/16 1,250 mg q18h  1,250 mg q12h 13.8 11 372  551   5/17 1,250 mg q12h - - -   5/18 1,250 mg q12h  1,000 mg q12h 22.4 11 647  519   5/19 1,000 mg q12h - - -   5/20 1,000 mg q12h 32.2 3 556   5/21 1,000 mg q12h - - -     Plan:  Continue current dose of 1,000 mg q12h  No level ordered at this time  Pharmacy will continue to monitor patient and adjust therapy as indicated    Toy Velasquez RPH  5/21/2024

## 2024-05-21 NOTE — PROGRESS NOTES
Bedside and Verbal shift change report given to Trino GARCÍA (oncoming nurse) by Delano RN (offgoing nurse). Report included the following information Nurse Handoff Report, Intake/Output, MAR, Recent Results, and Cardiac Rhythm NSR .       Trino AVILA RN and RAQUEL Wick have reviewed the chart and verify that the restraint order matches that of the restraint documentation, the order is not , and documentation is complete per the type of restraints implemented based on policy and free from omissions.

## 2024-05-21 NOTE — PROGRESS NOTES
Bedside and Verbal shift change report given to Kat FRYE RN (oncoming nurse) by Trino Newberry RN (offgoing nurse). Report included the following information Nurse Handoff Report, Adult Overview, and Cardiac Rhythm NSR .

## 2024-05-21 NOTE — PROGRESS NOTES
Palliative Medicine Follow Up  Patient Name: Paul Bobby  YOB: 1940  MRN: [id number]  Age: 83 y.o.  Gender: male    Date of Initial Consult: 5/7/2024  Date of Service: 5/21/2024  Provider: Stevenson Mendez MD  Hospital Day: 16  Admit Date: 5/6/2024  Referring Provider: Renetta Sanchez NP      Reasons for Consultation:  Goals of Care/ACP/symptom management    HISTORY OF PRESENT ILLNESS (HPI):   Paul Bobby is a 83 y.o. male with a past medical history of COPD, diabetes, hyperlipidemia, and dementia, who was admitted on 5/6/2024 from [place] [facility]/[place] with a diagnosis of acute hypoxic respiratory failure, aspiration pneumonia in setting of parainfluenza 3.  Patient was initially brought to emergency room for further evaluation of persistent [facility] and [facility].  Initially patient was placed on NRB followed by BiPAP due to acute hypoxic respiratory failure.  Due to patient's mental function and an episode of vomiting while on BiPAP, patient was subsequently intubated and transferred to ICU. Palliative care was consulted to assist in goals of care.    5/21/24: Patient opens eyes on tactile commands.  Remains on FiO2 of 50 percentage and fibrillator of 20.  Remains on NG tube feeding.  Off-and-on cough present.  Patient's daughter Ms. Stern is at bedside.    5/20/2024 Patient transferred to CVT stepdown, room across from nurses' station. Patient with eyes closed, did not open eyes to voice. Restraint on right wrist. No family in room. Planned meeting today at 12 noon with daughter.    (Notes from previous encounters)    5/17/2024  Patient  on HFNC 40L and 45% FIO2 He is receiving tube feeds via SBFT in nare. Looks ill and weaker today. Barely opens eyes to voice. Restraint on right wrist. Nurse in room. Patient has been downgraded to intermediate care but remains in ICU unit.    5/15/2024 Patient seen with Lana AGUERO. Update received from MANAN Sanchez on patient who

## 2024-05-21 NOTE — CARE COORDINATION
Call placed to pt's daughter Aimee Crump [phone] to discuss transitional care plan.  She stated pt is with [facility] and she was on the phone with Ms Bowling,  with [facility] to discuss facility placement.  She stated she also spoke with Palliative Care and will be deciding today if pt will go hospice or have a PEG tube.  She stated she has meeting with Palliative Care today.  She stated once they reach a decision, she will call CM so we can plan to move forward with goals of care.          Debbie Jo, SERAN RN  Care Management

## 2024-05-21 NOTE — PROGRESS NOTES
Comprehensive Nutrition Assessment    Type and Reason for Visit:  Reassess, Consult, NPO/Clear Liquid    Nutrition Recommendations/Plan:   Continue tube feeding regimen:   Formula: Glucerna 1.5  Goal Rate: 65 mL/hr x 20 hours (for anticipation of holding tube feeds for standard nursing care)   Water Flushes: 50 mL q 4 hours (300 mL total)  Goal Regimen Provides (with modulars): 1950 kcal, 107 gm pro, 1288 mL free water from tube feeding only, 100% RDIs  2.    Continue MVI/minerals and thiamine supplementation.   3.    Continue to monitor intake/tolerance of EN, weight, labs,a nd POC while admitted.      Malnutrition Assessment:  Malnutrition Status:  Severe malnutrition (05/14/24 1004)    Context:  Acute Illness     Findings of the 6 clinical characteristics of malnutrition:  Energy Intake:  Mild decrease in energy intake (Comment)  Weight Loss:  Unable to assess     Body Fat Loss:  Moderate body fat loss Orbital, Fat Overlying Ribs   Muscle Mass Loss:  Moderate muscle mass loss Temples (temporalis), Scapula (trapezius)  Fluid Accumulation:  No significant fluid accumulation     Strength:  Not Performed    Nutrition Assessment:    Presented with encephalopathy and hypoxia; resp failure and septic/hypovolemic shock. Emergently intubated 5/6 and transferred to ICU. Pt has been NPO since admit due to pressor requirements/lactate. TF initiated 5/9. Extubated 5/12. SLP following, last eval 5/16, rec'd NPO. NGT placed 5/13, TF initiated. Per flow sheets, tube feedings infusing at goal rate. Visited pt- observed feeds infusing at goal rate. Per history on pump, pt receiving average of >100% goal over previous 3 days. Will continue to monitor intake/tolerance of tube feedings while admitted.    Nutrition Related Findings:    Last BM: 5/19. Edema: none. Labs: BUN 21 H, POC glucose 176-199 x 24-hrs. Pertinent meds: liquid MVI/minerals, colace, thiamine. Wound Type: None       Current Nutrition Intake & Therapies:

## 2024-05-21 NOTE — PLAN OF CARE
Problem: Safety - Medical Restraint  Goal: Remains free of injury from restraints (Restraint for Interference with Medical Device)  Description: INTERVENTIONS:  1. Determine that other, less restrictive measures have been tried or would not be effective before applying the restraint  2. Evaluate the patient's condition at the time of restraint application  3. Inform patient/family regarding the reason for restraint  4. Q2H: Monitor safety, psychosocial status, comfort, nutrition and hydration  Recent Flowsheet Documentation  Taken 5/21/2024 0600 by Delano Bender RN  Remains free of injury from restraints (restraint for interference with medical device):   Determine that other, less restrictive measures have been tried or would not be effective before applying the restraint   Every 2 hours: Monitor safety, psychosocial status, comfort, nutrition and hydration  Taken 5/21/2024 0400 by Delano Bender RN  Remains free of injury from restraints (restraint for interference with medical device):   Determine that other, less restrictive measures have been tried or would not be effective before applying the restraint   Every 2 hours: Monitor safety, psychosocial status, comfort, nutrition and hydration  Taken 5/21/2024 0200 by Delano Bender RN  Remains free of injury from restraints (restraint for interference with medical device):   Determine that other, less restrictive measures have been tried or would not be effective before applying the restraint   Every 2 hours: Monitor safety, psychosocial status, comfort, nutrition and hydration  Taken 5/21/2024 0000 by Delano Bender RN  Remains free of injury from restraints (restraint for interference with medical device):   Determine that other, less restrictive measures have been tried or would not be effective before applying the restraint   Every 2 hours: Monitor safety, psychosocial status, comfort, nutrition and hydration  Taken 5/20/2024 2200 by Delano Bender

## 2024-05-21 NOTE — PROGRESS NOTES
Palliative Medicine   [facility] [phone]  [facility] [phone]    CODE STATUS: DNR / DNI    AMD STATUS:  Aimee Crump, daughter, is MPoA, per document on file.     Palliative team received call from pt's MPoA stating she is going to be at hospital and would like to meet to discuss plans for pt's care, moving forward.  Palliative team, Dr. DOROTHY Mendez MD with Palliative Medicine, Mera Orourke NP, and this LMSW met with pt's daughter in pt's room. Upon entry, pt laying in bed with head elevated receiving 20L/50% Oxygen via nasal canula, NG tube in place for tube feeds.  Pt's daughter contacted her sister, Jena via telephone.  MD provided update on pt's condition, concerns about not having significant recovery in the time he's been here, as would have been expected. MD discussed option of having pt assessed for potential peg tube placement vs transitioning pt to comfort measures.  Pt's daughters both state  they do not want to have pt assessed for feeding tube, as they worry he will not tolerate it, since he has to be in restraints to prevent him from pulling NG tube. Pt's son Rosales called and joined conversation. MD provided overview of conversation to him, informed him of options and recommendations for care.  MD answered all of son's questions to best of ability.  Pt's family to discuss overnight with follow up tomorrow.     Goals of Care:  Burdens and benefits of CPR/Intubation discussed.  Pt's daughters have elected to make pt DNR/DNI.  Pt's daughter, Aimee Crump signed DDNR as MPOA.  No further questions or concerns at this time.  MD notified, staff notified, DDNR scanned into EHR.  MD will enter order.     Thank you for this referral to Palliative Care. The palliative care team remains available to provide support to patient and their family.     Lana Marte, JOSE, APHSW-C  Palliative Medicine Inpatient   [facility]  [facility]  [phone]  [facility]   Palliative COPE Line: [phone])

## 2024-05-21 NOTE — PLAN OF CARE
Problem: Safety - Adult  Goal: Free from fall injury  5/21/2024 1000 by Trino Newberry RN  Outcome: Progressing  5/21/2024 0729 by Delano Bender RN  Outcome: Progressing     Problem: Pain  Goal: Verbalizes/displays adequate comfort level or baseline comfort level  5/21/2024 1000 by Trino Newberry RN  Outcome: Progressing  Flowsheets (Taken 5/21/2024 0800)  Verbalizes/displays adequate comfort level or baseline comfort level:   Encourage patient to monitor pain and request assistance   Assess pain using appropriate pain scale  5/21/2024 0729 by Delano Bender, RN  Outcome: Progressing  Flowsheets  Taken 5/21/2024 0000  Verbalizes/displays adequate comfort level or baseline comfort level:   Encourage patient to monitor pain and request assistance   Assess pain using appropriate pain scale   Administer analgesics based on type and severity of pain and evaluate response  Taken 5/20/2024 2000  Verbalizes/displays adequate comfort level or baseline comfort level:   Encourage patient to monitor pain and request assistance   Assess pain using appropriate pain scale   Administer analgesics based on type and severity of pain and evaluate response     Problem: Chronic Conditions and Co-morbidities  Goal: Patient's chronic conditions and co-morbidity symptoms are monitored and maintained or improved  5/21/2024 1000 by Trino Newberry RN  Outcome: Progressing  Flowsheets (Taken 5/21/2024 0800)  Care Plan - Patient's Chronic Conditions and Co-Morbidity Symptoms are Monitored and Maintained or Improved:   Monitor and assess patient's chronic conditions and comorbid symptoms for stability, deterioration, or improvement   Collaborate with multidisciplinary team to address chronic and comorbid conditions and prevent exacerbation or deterioration   Update acute care plan with appropriate goals if chronic or comorbid symptoms are exacerbated and prevent overall improvement and discharge  5/21/2024 0729 by Delano Bender,  restrictive measures have been tried or would not be effective before applying the restraint   Every 2 hours: Monitor safety, psychosocial status, comfort, nutrition and hydration

## 2024-05-21 NOTE — PLAN OF CARE
Problem: Occupational Therapy - Adult  Goal: By Discharge: Performs self-care activities at highest level of function for planned discharge setting.  See evaluation for individualized goals.  Description: Occupational Therapy Goals:  Initiated 5/14/2024, Re-evaluated 5/21/2024 with goals adjusted to current performance level to be met within 7-10 days.    1.  Patient will perform grooming with moderate assistance.  2.  Patient will perform upper body dressing with moderate assistance.  3.  Patient will participate in upper extremity therapeutic exercise/activities with minimal assistance/contact guard assist for 8-10 minutes to increase strength/endurance for ADLs.    4.  Patient will follow 50% of commands during functional activities with minimal verbal cues.    PLOF: PLOF obtained from chart review. Pt lives w/ family in a two level house, able to live on main level, walk in shower. Pt has cane. Unsure how much assistance pt needed w/ ADLs and mobility.     Outcome: Progressing       OCCUPATIONAL THERAPY RE-EVALUATION    Patient: Paul Bobby (83 y.o. male)  Date: 5/21/2024  Primary Diagnosis: Acute respiratory failure (HCC) [J96.00]  Altered mental status, unspecified altered mental status type [R41.82]  Acute hypoxemic respiratory failure (HCC) [J96.01]  AMS (altered mental status) [R41.82]  Precautions: Fall Risk, Aspiration Risk    ASSESSMENT :  Pt cleared to participate in OT re-evaluation by RN. Upon entering the room, the pt was supine in bed slumped to left side with eyes opened briefly. Patient nonverbal this date and with no command following. Patient total assist for repositioning to midline and total hand over hand for task initiation and ROM assessment. AAROM observed for R shoulder flexion only and during L elbow flexion/ extension. Patient would benefit from a 3 day trial of skilled OT for progression toward goals, active participation, and learning/carry-over ability.

## 2024-05-21 NOTE — PROGRESS NOTES
[facility]  Pulmonary, Critical Care, and Sleep Medicine    Name: Paul Bobby MRN: [id number]   : 1940 Hospital: [facility]   Date: 2024        Pulmonary Medicine: F/U Consult    Admission Date:   2024  LOS: 15  MAR reviewed and pertinent medications noted or modified as needed    IMPRESSION:   Acute Hypoxic Respiratory Failure - requiring mechanical ventilator, in the setting multifocal PNA and parainfluenza 3, extubated  to HHFNC, continues to require HHFNC  Aspiration PNA, resp. Cx + for  Klebsiella aerogenes and MRSA (05/10)  Parainfluenza 3 ()  Acute Encephalopathy-toxic/metabolic in nature superimposed on dementia, resolved back to baseline   Metabolic acidosis, lactic acidosis, resolved   ESTRELLA- prerenal azotemia vs. Ishemic ATN, resolved   Septic shock vs. hypovolemic- in the setting of multifocal PNA with partial b/l LL collapse and consildation/Aspiration PNA. Shock resolved  Chronic bronchitis and emhpysema with distal mucoid impaction, s/p multiple bronchs   COPD- not in acute exacerbation   Moderate colonic stool burden with large rectal stool ball   Cholelithiasis without evidence of acute cholecystitis   DM II   HLD  BMI Body mass index is 23.79 kg/m².  CODE STATUS: Full Code No additional code details       Patient Active Problem List   Diagnosis    Weakness    Syncope    Dementia (HCC)    AMS (altered mental status)    Acute respiratory failure (HCC)    Aspiration pneumonia of both lungs due to vomit (HCC)    Aspiration of food    Mucus plugging of bronchi    Acute encephalopathy    Acute hypoxemic respiratory failure (HCC)    ESTRELLA (acute kidney injury) (HCC)    Septic shock (HCC)    Lactic acidosis    Multifocal pneumonia    Metabolic acidosis    Palliative care encounter    Goals of care, counseling/discussion    DNR (do not resuscitate) discussion    Aspiration into airway    Parainfluenza    Chronic obstructive pulmonary disease

## 2024-05-21 NOTE — PROGRESS NOTES
[facility]  Progress Note    Patient: Paul Bobby Age: 83 y.o. : 1940 MR#: [id number] SSN: [id number]  Date: 2024         Assessment/Plan:     Acute Hypoxic Respiratory Failure- requiring mechanical ventilator, in the setting aspiration PNA and parainfluenza 3, extubated  to HFNC   Appreciate pulm assistance  Wean O2 as tolerated, RT to follow  CPT, saline nebs, metaneb per pulm  Aspiration PNA; resp culture 5/10 +MRSA, klebsiella aerogenes  Aspiration precautions ordered  NPO per SLP, receiving TF through dobhoff which he has tried to pull out on a few occasions. Not a good candidate for PEG.   IV abx: vanc. Completed zosyn . Will discontinue vanco and monitor off antibiotics.   Parainfluenza 3   Acute Encephalopathy-toxic/metabolic in nature superimposed on dementia, improving   Metabolic acidosis, lactic acidosis, resolved   ESTRELLA- prerenal azotemia vs. Ishemic ATN, resolved   Septic shock vs. hypovolemic- in the setting of multifocal PNA with partial b/l LL collapse and consildation/Aspiration PNA. Shock resolved  Chronic bronchitis and emphysema: with distal mucoid impaction, s/p multiple bronchs. Not in acute exacerbation   Moderate colonic stool burden  Colace, PRN miralax  Cholelithiasis: no evidence of acute cholecystitis   Hx DM II   Hx HLD   Dementia  Palliative care following  Fall precautions      I have evaluated the patient after initiation of intervention.  The patient is comfortable, uninjured, but continues to pose an imminent risk of injury to self and or serious disruption of treatment.  The patient's current medical and behavioral conditions that warrant the use intervention include danger to self and Interference with medical treatment. Restraint or seclusion will be discontinued at the earliest possible time, regardless of the scheduled expiration of the order.      DVT Prophylaxis:  []Lovenox  [x]Hep SQ  []SCDs  []Coumadin   - 0.04 K/UL    Differential Type AUTOMATED     POCT Glucose    Collection Time: 05/21/24  7:59 AM   Result Value Ref Range    POC Glucose 195 (H) 70 - 110 mg/dL   POCT Glucose    Collection Time: 05/21/24 11:37 AM   Result Value Ref Range    POC Glucose 212 (H) 70 - 110 mg/dL       Imaging:  No results found.    Time spent reviewing records, independently interpreting results, obtaining history from patient or caregiver, performing physical exam, ordering tests and medications, communicating with specialists, documenting in the chart, and coordinating overall care is 35 minutes    Celestina De La Rosa, DO     May 21, 2024

## 2024-05-21 NOTE — PROGRESS NOTES
Bedside and Verbal shift change report given to Kat GARCÍA (oncoming nurse) by Trino GRACÍA (offgoing nurse). Report included the following information Nurse Handoff Report, Intake/Output, MAR, Recent Results, and Cardiac Rhythm NSR .    I Trino GARCÍA ,and RAQUEL Stephens have reviewed the chart and verify that the restraint order matches that of the restraint documentation, the order is not , and documentation is complete per the type of restraints implemented based on policy and free from omissions.

## 2024-05-22 PROBLEM — Z51.5 ENCOUNTER FOR PALLIATIVE CARE: Status: ACTIVE | Noted: 2024-05-22

## 2024-05-22 LAB
ANION GAP SERPL CALC-SCNC: 5 MMOL/L (ref 3–18)
BASOPHILS # BLD: 0.1 K/UL (ref 0–0.1)
BASOPHILS NFR BLD: 1 % (ref 0–2)
BUN SERPL-MCNC: 20 MG/DL (ref 7–18)
BUN/CREAT SERPL: 25 (ref 12–20)
CALCIUM SERPL-MCNC: 9.1 MG/DL (ref 8.5–10.1)
CHLORIDE SERPL-SCNC: 104 MMOL/L (ref 100–111)
CO2 SERPL-SCNC: 27 MMOL/L (ref 21–32)
CREAT SERPL-MCNC: 0.81 MG/DL (ref 0.6–1.3)
DIFFERENTIAL METHOD BLD: ABNORMAL
EOSINOPHIL # BLD: 0.2 K/UL (ref 0–0.4)
EOSINOPHIL NFR BLD: 3 % (ref 0–5)
ERYTHROCYTE [DISTWIDTH] IN BLOOD BY AUTOMATED COUNT: 13.4 % (ref 11.6–14.5)
GLUCOSE BLD STRIP.AUTO-MCNC: 194 MG/DL (ref 70–110)
GLUCOSE BLD STRIP.AUTO-MCNC: 235 MG/DL (ref 70–110)
GLUCOSE SERPL-MCNC: 199 MG/DL (ref 74–99)
HCT VFR BLD AUTO: 34.3 % (ref 36–48)
HGB BLD-MCNC: 11.2 G/DL (ref 13–16)
IMM GRANULOCYTES # BLD AUTO: 0 K/UL (ref 0–0.04)
IMM GRANULOCYTES NFR BLD AUTO: 0 % (ref 0–0.5)
LYMPHOCYTES # BLD: 2 K/UL (ref 0.9–3.6)
LYMPHOCYTES NFR BLD: 31 % (ref 21–52)
MAGNESIUM SERPL-MCNC: 2.2 MG/DL (ref 1.6–2.6)
MCH RBC QN AUTO: 30.9 PG (ref 24–34)
MCHC RBC AUTO-ENTMCNC: 32.7 G/DL (ref 31–37)
MCV RBC AUTO: 94.5 FL (ref 78–100)
MONOCYTES # BLD: 0.7 K/UL (ref 0.05–1.2)
MONOCYTES NFR BLD: 11 % (ref 3–10)
NEUTS SEG # BLD: 3.5 K/UL (ref 1.8–8)
NEUTS SEG NFR BLD: 54 % (ref 40–73)
NRBC # BLD: 0 K/UL (ref 0–0.01)
NRBC BLD-RTO: 0 PER 100 WBC
PHOSPHATE SERPL-MCNC: 3.3 MG/DL (ref 2.5–4.9)
PLATELET # BLD AUTO: 408 K/UL (ref 135–420)
PMV BLD AUTO: 11.1 FL (ref 9.2–11.8)
POTASSIUM SERPL-SCNC: 4.3 MMOL/L (ref 3.5–5.5)
RBC # BLD AUTO: 3.63 M/UL (ref 4.35–5.65)
SODIUM SERPL-SCNC: 136 MMOL/L (ref 136–145)
WBC # BLD AUTO: 6.4 K/UL (ref 4.6–13.2)

## 2024-05-22 PROCEDURE — 6360000002 HC RX W HCPCS: Performed by: INTERNAL MEDICINE

## 2024-05-22 PROCEDURE — 99233 SBSQ HOSP IP/OBS HIGH 50: CPT | Performed by: INTERNAL MEDICINE

## 2024-05-22 PROCEDURE — 99232 SBSQ HOSP IP/OBS MODERATE 35: CPT

## 2024-05-22 PROCEDURE — 94761 N-INVAS EAR/PLS OXIMETRY MLT: CPT

## 2024-05-22 PROCEDURE — 2140000001 HC CVICU INTERMEDIATE R&B

## 2024-05-22 PROCEDURE — 6370000000 HC RX 637 (ALT 250 FOR IP): Performed by: EMERGENCY MEDICINE

## 2024-05-22 PROCEDURE — 6370000000 HC RX 637 (ALT 250 FOR IP): Performed by: NURSE PRACTITIONER

## 2024-05-22 PROCEDURE — 2580000003 HC RX 258: Performed by: INTERNAL MEDICINE

## 2024-05-22 PROCEDURE — 99232 SBSQ HOSP IP/OBS MODERATE 35: CPT | Performed by: STUDENT IN AN ORGANIZED HEALTH CARE EDUCATION/TRAINING PROGRAM

## 2024-05-22 PROCEDURE — 6360000002 HC RX W HCPCS: Performed by: NURSE PRACTITIONER

## 2024-05-22 PROCEDURE — 80048 BASIC METABOLIC PNL TOTAL CA: CPT

## 2024-05-22 PROCEDURE — 92526 ORAL FUNCTION THERAPY: CPT

## 2024-05-22 PROCEDURE — 84100 ASSAY OF PHOSPHORUS: CPT

## 2024-05-22 PROCEDURE — 82962 GLUCOSE BLOOD TEST: CPT

## 2024-05-22 PROCEDURE — 94640 AIRWAY INHALATION TREATMENT: CPT

## 2024-05-22 PROCEDURE — 6370000000 HC RX 637 (ALT 250 FOR IP): Performed by: INTERNAL MEDICINE

## 2024-05-22 PROCEDURE — 85025 COMPLETE CBC W/AUTO DIFF WBC: CPT

## 2024-05-22 PROCEDURE — 83735 ASSAY OF MAGNESIUM: CPT

## 2024-05-22 PROCEDURE — 94669 MECHANICAL CHEST WALL OSCILL: CPT

## 2024-05-22 PROCEDURE — 2700000000 HC OXYGEN THERAPY PER DAY

## 2024-05-22 PROCEDURE — 36415 COLL VENOUS BLD VENIPUNCTURE: CPT

## 2024-05-22 RX ADMIN — HEPARIN SODIUM 5000 UNITS: 5000 INJECTION INTRAVENOUS; SUBCUTANEOUS at 05:28

## 2024-05-22 RX ADMIN — HEPARIN SODIUM 5000 UNITS: 5000 INJECTION INTRAVENOUS; SUBCUTANEOUS at 21:30

## 2024-05-22 RX ADMIN — DOCUSATE SODIUM LIQUID 100 MG: 100 LIQUID ORAL at 21:17

## 2024-05-22 RX ADMIN — Medication 15 ML: at 10:52

## 2024-05-22 RX ADMIN — SODIUM CHLORIDE SOLN NEBU 3% 4 ML: 3 NEBU SOLN at 11:53

## 2024-05-22 RX ADMIN — DOCUSATE SODIUM LIQUID 100 MG: 100 LIQUID ORAL at 10:52

## 2024-05-22 RX ADMIN — SODIUM CHLORIDE SOLN NEBU 3% 4 ML: 3 NEBU SOLN at 20:26

## 2024-05-22 RX ADMIN — CHLORHEXIDINE GLUCONATE 15 ML: 1.2 RINSE ORAL at 21:17

## 2024-05-22 RX ADMIN — IPRATROPIUM BROMIDE AND ALBUTEROL SULFATE 1 DOSE: .5; 3 SOLUTION RESPIRATORY (INHALATION) at 11:53

## 2024-05-22 RX ADMIN — BUDESONIDE 1 MG: 1 SUSPENSION RESPIRATORY (INHALATION) at 08:20

## 2024-05-22 RX ADMIN — THIAMINE HYDROCHLORIDE 100 MG: 100 INJECTION, SOLUTION INTRAMUSCULAR; INTRAVENOUS at 10:52

## 2024-05-22 RX ADMIN — SODIUM CHLORIDE SOLN NEBU 3% 4 ML: 3 NEBU SOLN at 04:52

## 2024-05-22 RX ADMIN — ARFORMOTEROL TARTRATE 15 MCG: 15 SOLUTION RESPIRATORY (INHALATION) at 20:26

## 2024-05-22 RX ADMIN — BUDESONIDE 1 MG: 1 SUSPENSION RESPIRATORY (INHALATION) at 20:26

## 2024-05-22 RX ADMIN — IPRATROPIUM BROMIDE AND ALBUTEROL SULFATE 1 DOSE: .5; 3 SOLUTION RESPIRATORY (INHALATION) at 16:24

## 2024-05-22 RX ADMIN — ARFORMOTEROL TARTRATE 15 MCG: 15 SOLUTION RESPIRATORY (INHALATION) at 08:20

## 2024-05-22 RX ADMIN — IPRATROPIUM BROMIDE AND ALBUTEROL SULFATE 1 DOSE: .5; 3 SOLUTION RESPIRATORY (INHALATION) at 20:27

## 2024-05-22 RX ADMIN — SODIUM CHLORIDE SOLN NEBU 3% 4 ML: 3 NEBU SOLN at 16:25

## 2024-05-22 RX ADMIN — IPRATROPIUM BROMIDE AND ALBUTEROL SULFATE 1 DOSE: .5; 3 SOLUTION RESPIRATORY (INHALATION) at 08:21

## 2024-05-22 RX ADMIN — CHLORHEXIDINE GLUCONATE 15 ML: 1.2 RINSE ORAL at 10:52

## 2024-05-22 RX ADMIN — SODIUM CHLORIDE SOLN NEBU 3% 4 ML: 3 NEBU SOLN at 08:20

## 2024-05-22 RX ADMIN — IPRATROPIUM BROMIDE AND ALBUTEROL SULFATE 1 DOSE: .5; 3 SOLUTION RESPIRATORY (INHALATION) at 04:52

## 2024-05-22 RX ADMIN — HEPARIN SODIUM 5000 UNITS: 5000 INJECTION INTRAVENOUS; SUBCUTANEOUS at 15:00

## 2024-05-22 ASSESSMENT — PAIN SCALES - GENERAL
PAINLEVEL_OUTOF10: 0

## 2024-05-22 ASSESSMENT — PAIN SCALES - WONG BAKER: WONGBAKER_NUMERICALRESPONSE: NO HURT

## 2024-05-22 NOTE — PROGRESS NOTES
[facility] Hospitalist [facility]  Progress Note    Patient: Paul Bobby Age: 83 y.o. : 1940 MR#: [id number] SSN: [id number]  Date: 2024         Assessment/Plan:     Acute Hypoxic Respiratory Failure- requiring mechanical ventilator, in the setting aspiration PNA and parainfluenza 3, extubated  to HFNC   Appreciate pulm assistance  Wean O2 as tolerated, RT to follow  CPT, saline nebs, metaneb per pulm  Aspiration PNA; resp culture 5/10 +MRSA, klebsiella aerogenes  Aspiration precautions ordered  NPO per SLP, receiving TF through dobhoff which he has tried to pull out on a few occasions. Not a good candidate for PEG.   IV abx: vanc. Completed zosyn . Will discontinue vanco and monitor off antibiotics.   Parainfluenza 3   Acute Encephalopathy-toxic/metabolic in nature superimposed on dementia, improving   Metabolic acidosis, lactic acidosis, resolved   ESTRELLA- prerenal azotemia vs. Ishemic ATN, resolved   Septic shock vs. hypovolemic- in the setting of multifocal PNA with partial b/l LL collapse and consildation/Aspiration PNA. Shock resolved  Chronic bronchitis and emphysema: with distal mucoid impaction, s/p multiple bronchs. Not in acute exacerbation   Moderate colonic stool burden  Colace, PRN miralax  Cholelithiasis: no evidence of acute cholecystitis   Hx DM II   Hx HLD   Dementia  Palliative care following  Fall precautions  Family has agreed to hospice and picked a compnay.       I have evaluated the patient after initiation of intervention.  The patient is comfortable, uninjured, but continues to pose an imminent risk of injury to self and or serious disruption of treatment.  The patient's current medical and behavioral conditions that warrant the use intervention include danger to self and Interference with medical treatment. Restraint or seclusion will be discontinued at the earliest possible time, regardless of the scheduled expiration of the order.      DVT

## 2024-05-22 NOTE — PLAN OF CARE
Problem: Confusion  Goal: Confusion, delirium, dementia, or psychosis is improved or at baseline  Description: INTERVENTIONS:  1. Assess for possible contributors to thought disturbance, including medications, impaired vision or hearing, underlying metabolic abnormalities, dehydration, psychiatric diagnoses, and notify attending LIP  2. [place] high risk fall precautions, as indicated  3. Provide frequent short contacts to provide reality reorientation, refocusing and direction  4. Decrease environmental stimuli, including noise as appropriate  5. Monitor and intervene to maintain adequate nutrition, hydration, elimination, sleep and activity  6. If unable to ensure safety without constant attention obtain sitter and review sitter guidelines with assigned personnel  7. Initiate Psychosocial CNS and Spiritual Care consult, as indicated  5/22/2024 0542 by Kat Jacobson RN  Outcome: Progressing  5/22/2024 0542 by Kat Jacobson RN  Outcome: Progressing     Problem: Nutrition Deficit:  Goal: Optimize nutritional status  5/22/2024 0542 by Kat Jacobson RN  Outcome: Progressing  5/22/2024 0542 by Kat Jacobson RN  Outcome: Progressing     Problem: Skin/Tissue Integrity  Goal: Absence of new skin breakdown  Description: 1.  Monitor for areas of redness and/or skin breakdown  2.  Assess vascular access sites hourly  3.  Every 4-6 hours minimum:  Change oxygen saturation probe site  4.  Every 4-6 hours:  If on nasal continuous positive airway pressure, respiratory therapy assess nares and determine need for appliance change or resting period.  5/22/2024 0542 by Kat Jacobson RN  Outcome: Progressing  5/22/2024 0542 by Kat Jacobson RN  Outcome: Progressing     Problem: Chronic Conditions and Co-morbidities  Goal: Patient's chronic conditions and co-morbidity symptoms are monitored and maintained or improved  5/22/2024 0542 by Kat Jacobson RN  Outcome: Progressing  5/22/2024 0542 by Kat Jacobson RN  Outcome: Progressing

## 2024-05-22 NOTE — ACP (ADVANCE CARE PLANNING)
Advance Care Planning   Healthcare Decision Maker:    Primary Decision Maker: Aimee Crump - Child - [phone]    Click here to complete Healthcare Decision Makers including selection of the Healthcare Decision Maker Relationship (ie \"Primary\").     completed a follow up  visit with this palliative care patient. Found patient not in a condition where he could communicate at this time.  An advance directive is on file.Chaplains will continue to follow and will provide pastoral care on an as needed/requested basis    Chaplain Cb cMcormick   Board Certified    Spiritual Care   [phone]                   
call and update. Provided her with our contact information.       Resuscitation Status:   Code Status: Full Code     Documentation Completed:  -No new documents completed.      Sarah Alba RN  Palliative Medicine Inpatient RN  Palliative COPE Line: [phone]

## 2024-05-22 NOTE — PROGRESS NOTES
Bedside and Verbal shift change report given to Trino GARCÍA (oncoming nurse) by Kat RN (offgoing nurse). Report included the following information Nurse Handoff Report, Intake/Output, MAR, Recent Results, and Cardiac Rhythm NSR .    I Trino GARCÍA and RAQUEL Stephens have reviewed the chart and verify that the restraint order matches that of the restraint documentation, the order is not , and documentation is complete per the type of restraints implemented based on policy and free from omissions.

## 2024-05-22 NOTE — PALLIATIVE CARE
Please see note 5/21/24 from Dr. Mendez, Palliative Medicine.  Patient's code status changed in Epic to DNR/DNI. Dr. De La Rosa updated via secure text.    OBED Coronado, FNP-C  Palliative Medicine  [facility], [phone]  [facility], [phone]  Available by text on [address]

## 2024-05-22 NOTE — PROGRESS NOTES
Palliative Medicine   [facility] [phone]  [facility] [phone]    CODE STATUS:  DNR / DNI    AMD STATUS:  Aimee Crump is MPoDEYVI, per document on file.     Palliative Care team, Mera Orourke NP and this LMSW met with pt at bedside for follow up visit.  Upon entry, pt laying in bed with head elevated, leaning to his left, sleeping, with High flow O2 at 20L/40%, via nasal canula. Speech therapist attended to pt and did swallow eval, pt awakened enough to participate in evaluation.  Still need to determine if family wants peg tube evaluation or not.      Palliative team made telephone contact with pt's MPoA, Aimee Crump at [phone], for follow up. She reports she has spoken with her brother and they are leaning towards comfort measures, but they are going to talk as a family once more tonight and make decision tomorrow. Aimee agreeable to follow up call tomorrow to get decision, so that appropriate referrals can be made.  No further questions or concerns at this time.     Thank you for this referral to Palliative Care. The palliative care team remains available to provide support to patient and their family.     Lana Marte, JOSE, APHSW-C  Palliative Medicine Inpatient   [place] [phone]  [facility]   Palliative COPE Line: [phone] ([phone])

## 2024-05-22 NOTE — PROGRESS NOTES
Bedside and Verbal shift change report given to RAQUEL Alba (oncoming nurse) by RAQUEL Jameson (offgoing nurse). Report included the following information Nurse Handoff Report, Intake/Output, MAR, Recent Results, Med Rec Status, and Cardiac Rhythm NSR .      I RAQUEL Alba and RAQUEL Jameson have reviewed the chart and verify that the restraint order matches that of the restraint documentation, the order is not , and documentation is complete per the type of restraints implemented based on policy and free from omissions.     > Pt is assessed.    > Pt is sleeping, easily awakened    > Does not respond verbally    > V/S is stable    : Scheduled meds given.    > Pt is awake.    > Responds No to pain question through nodding.    0136: New bag of Glucerna hang    0430: Pt is awake & alert    > Given a CHG bath    > Complete bed changed; gown changed    > Pt speaks up when he feels uncomfortable during hygiene routine.

## 2024-05-22 NOTE — PROGRESS NOTES
Bedside and Verbal shift change report given to Anjali GARCÍA (oncoming nurse) by Trino GARCÍA (offgoing nurse). Report included the following information Nurse Handoff Report, Intake/Output, MAR, Recent Results, and Cardiac Rhythm NSR .     I Trino GARCÍA and RAQUEL Alba have reviewed the chart and verify that the restraint order matches that of the restraint documentation, the order is not , and documentation is complete per the type of restraints implemented based on policy and free from omissions.

## 2024-05-22 NOTE — PLAN OF CARE
Problem: Safety - Adult  Goal: Free from fall injury  5/22/2024 0958 by Trino Newberry RN  Outcome: Progressing  Flowsheets (Taken 5/22/2024 0733)  Free From Fall Injury: Instruct family/caregiver on patient safety  5/22/2024 0542 by Kat Jacobson RN  Outcome: Progressing  5/22/2024 0542 by Kat Jacobson RN  Outcome: Progressing     Problem: Pain  Goal: Verbalizes/displays adequate comfort level or baseline comfort level  5/22/2024 0958 by Trino Newberry RN  Outcome: Progressing  Flowsheets (Taken 5/22/2024 0800)  Verbalizes/displays adequate comfort level or baseline comfort level: Assess pain using appropriate pain scale  5/22/2024 0542 by Kat Jacobson RN  Outcome: Progressing  5/22/2024 0542 by Kat Jacobson RN  Outcome: Progressing     Problem: Chronic Conditions and Co-morbidities  Goal: Patient's chronic conditions and co-morbidity symptoms are monitored and maintained or improved  5/22/2024 0958 by Trino Newberry RN  Outcome: Progressing  Flowsheets (Taken 5/22/2024 0800)  Care Plan - Patient's Chronic Conditions and Co-Morbidity Symptoms are Monitored and Maintained or Improved:   Monitor and assess patient's chronic conditions and comorbid symptoms for stability, deterioration, or improvement   Collaborate with multidisciplinary team to address chronic and comorbid conditions and prevent exacerbation or deterioration   Update acute care plan with appropriate goals if chronic or comorbid symptoms are exacerbated and prevent overall improvement and discharge  5/22/2024 0542 by Kat Jacobson RN  Outcome: Progressing  5/22/2024 0542 by Kat Jacobson RN  Outcome: Progressing     Problem: Skin/Tissue Integrity  Goal: Absence of new skin breakdown  Description: 1.  Monitor for areas of redness and/or skin breakdown  2.  Assess vascular access sites hourly  3.  Every 4-6 hours minimum:  Change oxygen saturation probe site  4.  Every 4-6 hours:  If on nasal continuous positive airway pressure, respiratory therapy

## 2024-05-22 NOTE — CARE COORDINATION
Called pt's daughter Aimee Crump [phone].  She stated they are yet to make a decision about hospice or not.  She stated they will let us know by tomorrow.  She stated they are waiting to hear from her brother who lives in [place].          Debbie Jo, SERAN RN  Care Management

## 2024-05-22 NOTE — PROGRESS NOTES
[facility]  Pulmonary, Critical Care, and Sleep Medicine    Name: Paul Bobby MRN: [id number]   : 1940 Hospital: [facility]   Date: 2024        Pulmonary Medicine: F/U Consult    Admission Date:   2024  LOS: 16  MAR reviewed and pertinent medications noted or modified as needed    IMPRESSION:   Acute Hypoxic Respiratory Failure - requiring mechanical ventilator, in the setting multifocal PNA and parainfluenza 3, extubated  to HFNC, continues to require HFNC  Aspiration PNA, resp. Cx + for  Klebsiella aerogenes and MRSA (05/10)  Parainfluenza 3 ()  Acute Encephalopathy-toxic/metabolic in nature superimposed on dementia, resolved back to baseline   Metabolic acidosis, lactic acidosis, resolved   ESTRELLA- prerenal azotemia vs. Ishemic ATN, resolved   Septic shock vs. hypovolemic- in the setting of multifocal PNA with partial b/l LL collapse and consildation/Aspiration PNA. Shock resolved  Chronic bronchitis and emhpysema with distal mucoid impaction, s/p multiple bronchs   COPD- not in acute exacerbation   Moderate colonic stool burden with large rectal stool ball   Cholelithiasis without evidence of acute cholecystitis   Chronic dysphagia with aspiration  DM II   HLD  BMI Body mass index is 23.79 kg/m².  Deconditioning  Adult failure to thrive       Patient Active Problem List   Diagnosis    Weakness    Syncope    Dementia (HCC)    AMS (altered mental status)    Acute respiratory failure (HCC)    Aspiration pneumonia of both lungs due to vomit (HCC)    Aspiration of food    Mucus plugging of bronchi    Acute encephalopathy    Acute hypoxemic respiratory failure (HCC)    ESTRELLA (acute kidney injury) (HCC)    Septic shock (HCC)    Lactic acidosis    Multifocal pneumonia    Metabolic acidosis    Palliative care encounter    Goals of care, counseling/discussion    DNR (do not resuscitate) discussion    Aspiration into airway    Parainfluenza    Chronic  docusate (COLACE) 50 MG/5ML liquid 100 mg  100 mg Oral BID Renetta Sanchez, APRN - NP   100 mg at 05/22/24 1052                 Imaging:  I have personally reviewed the patient’s radiographs and have reviewed the reports:    XR CHEST PORTABLE  Narrative: XR CHEST PORTABLE    Indication: Worsening respiratory status    Comparison: 5/14/2024    Findings: A weighted enteric tube courses below the diaphragm and into the  stomach.    The cardiomediastinal silhouette is stable.    Lung volumes are shallow but with slightly improved aeration in both lung bases.  No pleural effusion or pneumothorax.    The visualized osseous structures are unremarkable.  Impression: 1.  Shallow lung volumes but with slightly improving probable bibasilar  atelectasis. No new focal consolidation.                 Very poor prognosis      Remi Mendez MD/MPH     Pulmonary, Critical Care Medicine  [facility]

## 2024-05-22 NOTE — PROGRESS NOTES
Bedside and Verbal shift change report given to Trino Newberry RN (oncoming nurse) by Kat Jacobson RN (offgoing nurse). Report included the following information Nurse Handoff Report, Adult Overview, and Cardiac Rhythm NSR .      I Kat Jacobson RN ,Celi GARCÍA and Trino Newberry RN, have reviewed the chart and verify that the restraint order matches that of the restraint documentation, the order is not , and documentation is complete per the type of restraints implemented based on policy and free from omissions.

## 2024-05-22 NOTE — PROGRESS NOTES
Palliative Medicine Follow Up  Patient Name: Paul Bobby  YOB: 1940  MRN: [id number]  Age: 83 y.o.  Gender: male    Date of Initial Consult: 5/7/2024  Date of Service: 5/22/2024  Time: 3:23 PM  Provider: MARTHA Pennington  Hospital Day: 17  Admit Date: 5/6/2024  Referring Provider: Renetta Sanchez NP    Reasons for Consultation:  Goals of Care/ACP/symptom management    HISTORY OF PRESENT ILLNESS (HPI):   Paul Bobby is a 83 y.o. male with a past medical history of COPD, diabetes, hyperlipidemia, and dementia, who was admitted on 5/6/2024 from [facility]/home with a diagnosis of acute hypoxic respiratory failure, aspiration pneumonia in setting of parainfluenza 3.  Patient was initially brought to emergency room for further evaluation of persistent [facility] and [facility].  Initially patient was placed on NRB followed by BiPAP due to acute hypoxic respiratory failure.  Due to patient's mental function and an episode of vomiting while on BiPAP, patient was subsequently intubated and transferred to ICU. Palliative care was consulted to assist in goals of care.    5/22/2024 Patient being evaluated by Speech Therapy. Assist therapist in repositioning patient in bed to upright position. Patient alert. Took 2 spoonfuls of thickened liquid. Had good cough reflex. Nonverbal, appeared tired. Restraints in place. SBFT in place. No respiratory distress, on HFNC 20L 45% Fi02.    (Prior encounters from Palliative Care)    5/21/24: Patient opens eyes on tactile commands.  Remains on FiO2 of 50 percentage and fibrillator of 20.  Remains on NG tube feeding.  Off-and-on cough present.  Patient's daughter Ms. Stern is at bedside.     5/20/2024 Patient transferred to CVT stepdown, room across from nurses' station. Patient with eyes closed, did not open eyes to voice. Restraint on right wrist. No family in room. Planned meeting today at 12 noon with daughter.    (Notes from  inspiration: None  Accessory muscle use: rise in clavicle during inspiration: None  Grunting at end-expiration: guttural sound: None  Nasal flaring: involuntary movement of nares: None  Look of fear: None  Total : 0      Vital Signs: Blood pressure (!) 159/96, pulse 80, temperature 98.5 °F (36.9 °C), temperature source Axillary, resp. rate 18, height 1.778 m (5' 10\"), weight 75.2 kg (165 lb 12.6 oz), SpO2 95 %.    PHYSICAL ASSESSMENT:   General: [] Oriented x3  [] Well appearing  [] Intubated  [x]Ill appearing  []Other:  Mental Status: [] Normal mental status exam  [x] Drowsy  [x] Confused  [x]Other: kept eyes closed  Cardiovascular: [x] Regular rate/rhythm  [] Arrhythmia  [] Other:   Chest: [] Effort normal  []Lungs clear  [] Respiratory distress  [] Tachypnea  [x] Other: on HFNC, no signs of distress   Abdomen: [] Soft/non-tender  [x] Normal appearance  [] Distended  [] Ascites  [] Other:  Neurological: [] Normal speech  [] Normal sensation  [x]Deficits present: appears too weak to converse  Extremity: [] Normal skin color/temp  [] Clubbing/cyanosis  [] No edema  [x] Other:  Edematous in extremities  Wt Readings from Last 15 Encounters:   05/17/24 75.2 kg (165 lb 12.6 oz)   12/01/22 78.9 kg (174 lb)   11/03/22 79 kg (174 lb 2.6 oz)   09/08/22 68.5 kg (151 lb)   05/12/22 68.5 kg (151 lb)   01/25/22 68.5 kg (151 lb)   12/30/21 68.5 kg (151 lb)        Current Diet: Diet NPO  ADULT TUBE FEEDING; Nasogastric; Diabetic; Continuous; 30; Yes; 10; Q 6 hours; 65; 50; Q 4 hours       PSYCHOSOCIAL/SPIRITUAL SCREENING:   Palliative IDT has assessed this patient for cultural preferences / practices and a referral made as appropriate to needs (Cultural Services, Patient Advocacy, Ethics, etc.)    Spiritual Affiliation: Seventh Day Buddhist    Any spiritual / Baptist concerns:  [] Yes /  [x] No   If \"Yes\" to discuss with pastoral care during IDT     Does caregiver feel burdened by caring for their loved one:   [] Yes /  [x] No  Skin Substitute Text: The defect edges were debeveled with a #15 scalpel blade.  Given the location of the defect, shape of the defect and the proximity to free margins a skin substitute graft was deemed most appropriate.  The graft material was trimmed to fit the size of the defect. The graft was then placed in the primary defect and oriented appropriately.

## 2024-05-22 NOTE — PLAN OF CARE
Problem: SLP Adult - Impaired Swallowing  Goal: By Discharge: Advance to least restrictive diet without signs or symptoms of aspiration for planned discharge setting.  See evaluation for individualized goals.  Description: Patient will:  1. Tolerate PO trials with 0 s/s overt distress in 4/5 trials  2. Utilize compensatory swallow strategies/maneuvers (decrease bite/sip, size/rate, alt. liq/sol) with min cues in 4/5 trials  3. Perform oral-motor/laryngeal exercises to increase oropharyngeal swallow function with min cues  4. Complete an objective swallow study (i.e., MBSS) to assess swallow integrity, r/o aspiration, and determine of safest LRD, min A    Recommend:   NPO with alternate means of nutrition/hydration vs comfort feeds   Strict aspiration precautions (HOB >30 degrees at all times, Oral care TID)    Outcome: Not Progressing   SPEECH LANGUAGE PATHOLOGY DYSPHAGIA TREATMENT    Patient: Paul Bobby (83 y.o. male)  Date: 5/22/2024  Diagnosis: Acute respiratory failure (HCC) [J96.00]  Altered mental status, unspecified altered mental status type [R41.82]  Acute hypoxemic respiratory failure (HCC) [J96.01]  AMS (altered mental status) [R41.82] AMS (altered mental status)      Precautions: Standard, aspiration  PLOF: As per H&P      ASSESSMENT:  Pt seen for follow up dysphagia management. Assisted Pt is sitting upright in bed for PO trials. Pt continues to be on 20L 50% FiO2 HFNC. Pt with delayed wet/congested cough following 2 trials of honey thick liquids via 1/2 tsp. Improved bolus acceptance and manipulation on this date. Delayed swallow initiation and weak/decreased laryngeal elevation to palpation with all trials. Pt continues to be at an extremely high risk of aspiration with all PO. Recommend continuation of NPO with dobhoff, ? Consideration of more permanent means of nutrition/hydration vs comfort feeds, strict aspiration precautions, oral care with suctioning, and meds via dobhoff/IV.  regarding diet recommendations and thickener instructions provided.  [x]         Posted safety precautions in patient's room.    Thank you for this referral.    Cira Bojorquez M.S. CCC-SLP  Speech Language Pathologist

## 2024-05-23 LAB
ANION GAP SERPL CALC-SCNC: 6 MMOL/L (ref 3–18)
BASOPHILS # BLD: 0.1 K/UL (ref 0–0.1)
BASOPHILS NFR BLD: 1 % (ref 0–2)
BUN SERPL-MCNC: 25 MG/DL (ref 7–18)
BUN/CREAT SERPL: 25 (ref 12–20)
CALCIUM SERPL-MCNC: 9.5 MG/DL (ref 8.5–10.1)
CHLORIDE SERPL-SCNC: 103 MMOL/L (ref 100–111)
CO2 SERPL-SCNC: 29 MMOL/L (ref 21–32)
CREAT SERPL-MCNC: 1.02 MG/DL (ref 0.6–1.3)
DIFFERENTIAL METHOD BLD: ABNORMAL
EOSINOPHIL # BLD: 0.2 K/UL (ref 0–0.4)
EOSINOPHIL NFR BLD: 2 % (ref 0–5)
ERYTHROCYTE [DISTWIDTH] IN BLOOD BY AUTOMATED COUNT: 13.5 % (ref 11.6–14.5)
GLUCOSE BLD STRIP.AUTO-MCNC: 251 MG/DL (ref 70–110)
GLUCOSE SERPL-MCNC: 200 MG/DL (ref 74–99)
HCT VFR BLD AUTO: 37.3 % (ref 36–48)
HGB BLD-MCNC: 11.6 G/DL (ref 13–16)
IMM GRANULOCYTES # BLD AUTO: 0 K/UL (ref 0–0.04)
IMM GRANULOCYTES NFR BLD AUTO: 1 % (ref 0–0.5)
LYMPHOCYTES # BLD: 2.6 K/UL (ref 0.9–3.6)
LYMPHOCYTES NFR BLD: 34 % (ref 21–52)
MAGNESIUM SERPL-MCNC: 2.4 MG/DL (ref 1.6–2.6)
MCH RBC QN AUTO: 30.1 PG (ref 24–34)
MCHC RBC AUTO-ENTMCNC: 31.1 G/DL (ref 31–37)
MCV RBC AUTO: 96.6 FL (ref 78–100)
MONOCYTES # BLD: 0.8 K/UL (ref 0.05–1.2)
MONOCYTES NFR BLD: 11 % (ref 3–10)
NEUTS SEG # BLD: 4.1 K/UL (ref 1.8–8)
NEUTS SEG NFR BLD: 52 % (ref 40–73)
NRBC # BLD: 0 K/UL (ref 0–0.01)
NRBC BLD-RTO: 0 PER 100 WBC
PHOSPHATE SERPL-MCNC: 3.6 MG/DL (ref 2.5–4.9)
PLATELET # BLD AUTO: 414 K/UL (ref 135–420)
PMV BLD AUTO: 10.9 FL (ref 9.2–11.8)
POTASSIUM SERPL-SCNC: 4.5 MMOL/L (ref 3.5–5.5)
RBC # BLD AUTO: 3.86 M/UL (ref 4.35–5.65)
SODIUM SERPL-SCNC: 138 MMOL/L (ref 136–145)
WBC # BLD AUTO: 7.8 K/UL (ref 4.6–13.2)

## 2024-05-23 PROCEDURE — 97535 SELF CARE MNGMENT TRAINING: CPT

## 2024-05-23 PROCEDURE — 2140000001 HC CVICU INTERMEDIATE R&B

## 2024-05-23 PROCEDURE — 6360000002 HC RX W HCPCS: Performed by: INTERNAL MEDICINE

## 2024-05-23 PROCEDURE — 36415 COLL VENOUS BLD VENIPUNCTURE: CPT

## 2024-05-23 PROCEDURE — 6370000000 HC RX 637 (ALT 250 FOR IP): Performed by: INTERNAL MEDICINE

## 2024-05-23 PROCEDURE — 99232 SBSQ HOSP IP/OBS MODERATE 35: CPT | Performed by: STUDENT IN AN ORGANIZED HEALTH CARE EDUCATION/TRAINING PROGRAM

## 2024-05-23 PROCEDURE — 85025 COMPLETE CBC W/AUTO DIFF WBC: CPT

## 2024-05-23 PROCEDURE — 83735 ASSAY OF MAGNESIUM: CPT

## 2024-05-23 PROCEDURE — 94669 MECHANICAL CHEST WALL OSCILL: CPT

## 2024-05-23 PROCEDURE — 92526 ORAL FUNCTION THERAPY: CPT

## 2024-05-23 PROCEDURE — 2580000003 HC RX 258: Performed by: INTERNAL MEDICINE

## 2024-05-23 PROCEDURE — 99233 SBSQ HOSP IP/OBS HIGH 50: CPT

## 2024-05-23 PROCEDURE — 84100 ASSAY OF PHOSPHORUS: CPT

## 2024-05-23 PROCEDURE — 2700000000 HC OXYGEN THERAPY PER DAY

## 2024-05-23 PROCEDURE — 6370000000 HC RX 637 (ALT 250 FOR IP): Performed by: NURSE PRACTITIONER

## 2024-05-23 PROCEDURE — 6370000000 HC RX 637 (ALT 250 FOR IP): Performed by: EMERGENCY MEDICINE

## 2024-05-23 PROCEDURE — 99233 SBSQ HOSP IP/OBS HIGH 50: CPT | Performed by: INTERNAL MEDICINE

## 2024-05-23 PROCEDURE — 82962 GLUCOSE BLOOD TEST: CPT

## 2024-05-23 PROCEDURE — 94640 AIRWAY INHALATION TREATMENT: CPT

## 2024-05-23 PROCEDURE — 80048 BASIC METABOLIC PNL TOTAL CA: CPT

## 2024-05-23 PROCEDURE — 6360000002 HC RX W HCPCS: Performed by: NURSE PRACTITIONER

## 2024-05-23 PROCEDURE — 94761 N-INVAS EAR/PLS OXIMETRY MLT: CPT

## 2024-05-23 RX ORDER — LORAZEPAM 2 MG/ML
1 CONCENTRATE ORAL
Status: DISCONTINUED | OUTPATIENT
Start: 2024-05-23 | End: 2024-05-24 | Stop reason: HOSPADM

## 2024-05-23 RX ORDER — MORPHINE SULFATE 20 MG/ML
5 SOLUTION ORAL
Status: DISCONTINUED | OUTPATIENT
Start: 2024-05-23 | End: 2024-05-24 | Stop reason: HOSPADM

## 2024-05-23 RX ORDER — ATROPINE SULFATE 10 MG/ML
2 SOLUTION/ DROPS OPHTHALMIC EVERY 4 HOURS PRN
Status: DISCONTINUED | OUTPATIENT
Start: 2024-05-23 | End: 2024-05-24 | Stop reason: HOSPADM

## 2024-05-23 RX ORDER — ACETAMINOPHEN 650 MG/1
650 SUPPOSITORY RECTAL EVERY 4 HOURS PRN
Qty: 60 SUPPOSITORY | Refills: 3 | Status: SHIPPED | OUTPATIENT
Start: 2024-05-23

## 2024-05-23 RX ORDER — MORPHINE SULFATE 20 MG/ML
5 SOLUTION ORAL
Qty: 30 ML | Refills: 0 | Status: SHIPPED | OUTPATIENT
Start: 2024-05-23 | End: 2024-06-02

## 2024-05-23 RX ORDER — LORAZEPAM 2 MG/ML
1 CONCENTRATE ORAL
Qty: 30 ML | Refills: 0 | Status: SHIPPED | OUTPATIENT
Start: 2024-05-23 | End: 2024-06-06

## 2024-05-23 RX ORDER — ACETAMINOPHEN 650 MG/1
650 SUPPOSITORY RECTAL EVERY 4 HOURS PRN
Status: DISCONTINUED | OUTPATIENT
Start: 2024-05-23 | End: 2024-05-24 | Stop reason: HOSPADM

## 2024-05-23 RX ORDER — BISACODYL 10 MG
10 SUPPOSITORY, RECTAL RECTAL DAILY PRN
Qty: 10 SUPPOSITORY | Refills: 0 | Status: SHIPPED | OUTPATIENT
Start: 2024-05-23

## 2024-05-23 RX ORDER — PROMETHAZINE HYDROCHLORIDE 25 MG/1
25 SUPPOSITORY RECTAL EVERY 4 HOURS PRN
Status: DISCONTINUED | OUTPATIENT
Start: 2024-05-23 | End: 2024-05-24 | Stop reason: HOSPADM

## 2024-05-23 RX ADMIN — SODIUM CHLORIDE SOLN NEBU 3% 4 ML: 3 NEBU SOLN at 14:17

## 2024-05-23 RX ADMIN — CHLORHEXIDINE GLUCONATE 15 ML: 1.2 RINSE ORAL at 09:36

## 2024-05-23 RX ADMIN — SODIUM CHLORIDE SOLN NEBU 3% 4 ML: 3 NEBU SOLN at 08:00

## 2024-05-23 RX ADMIN — ARFORMOTEROL TARTRATE 15 MCG: 15 SOLUTION RESPIRATORY (INHALATION) at 20:40

## 2024-05-23 RX ADMIN — IPRATROPIUM BROMIDE AND ALBUTEROL SULFATE 1 DOSE: .5; 3 SOLUTION RESPIRATORY (INHALATION) at 08:00

## 2024-05-23 RX ADMIN — THIAMINE HYDROCHLORIDE 100 MG: 100 INJECTION, SOLUTION INTRAMUSCULAR; INTRAVENOUS at 09:36

## 2024-05-23 RX ADMIN — IPRATROPIUM BROMIDE AND ALBUTEROL SULFATE 1 DOSE: .5; 3 SOLUTION RESPIRATORY (INHALATION) at 14:17

## 2024-05-23 RX ADMIN — HEPARIN SODIUM 5000 UNITS: 5000 INJECTION INTRAVENOUS; SUBCUTANEOUS at 06:06

## 2024-05-23 RX ADMIN — Medication 15 ML: at 09:37

## 2024-05-23 RX ADMIN — IPRATROPIUM BROMIDE AND ALBUTEROL SULFATE 1 DOSE: .5; 3 SOLUTION RESPIRATORY (INHALATION) at 16:56

## 2024-05-23 RX ADMIN — BUDESONIDE 1 MG: 1 SUSPENSION RESPIRATORY (INHALATION) at 08:00

## 2024-05-23 RX ADMIN — DOCUSATE SODIUM LIQUID 100 MG: 100 LIQUID ORAL at 09:36

## 2024-05-23 RX ADMIN — HEPARIN SODIUM 5000 UNITS: 5000 INJECTION INTRAVENOUS; SUBCUTANEOUS at 13:30

## 2024-05-23 RX ADMIN — ARFORMOTEROL TARTRATE 15 MCG: 15 SOLUTION RESPIRATORY (INHALATION) at 08:00

## 2024-05-23 RX ADMIN — IPRATROPIUM BROMIDE AND ALBUTEROL SULFATE 1 DOSE: .5; 3 SOLUTION RESPIRATORY (INHALATION) at 20:40

## 2024-05-23 ASSESSMENT — PAIN SCALES - GENERAL: PAINLEVEL_OUTOF10: 0

## 2024-05-23 NOTE — PALLIATIVE CARE
Spoke with patient's three children. Goals of care moving to comfort measures/hospice. Dr. De La Rosa notified by secure text. Full note to follow.    OBED Coronado, FNP-C  Palliative Medicine  [facility], [phone]  [facility], [phone]  Available by text on Patient [employer]

## 2024-05-23 NOTE — PROGRESS NOTES
Hospice referral received to evaluate patient for GIP. Chart review in process.      Thank you for the referral to [facility]. If we can be of further assistance please contact [phone].     Flavia Smith MDIV, BSN, RN  Hospice Liaison  [address], [address]  [place] [address]  Office: [phone]  Fax: [phone]  Mobile:  [phone]  Email: [email or web address]

## 2024-05-23 NOTE — CARE COORDINATION
Chart reviewed.  Pt now has hospice orders.  Pt on Hi flow oxygen 20L  Called Flavia of [facility].  She stated they will review  Called pt's daughter Aimee Crump.  She stated she has been told that pt will remain inpatient hospice because of his oxygen needs.   Pt is active with [facility].  Pt's daughter aware that once pt becomes inpatient hospice in the hospital, [facility] will discharge pt from their services.  CM will continue to follow this case.            SERA StricklandN RN  Care Management

## 2024-05-23 NOTE — WOUND CARE
Room 2307: Focused wound assess    Behind right ear, stage 2 pressure injury from vapotherm oxygen.  Ear padding has slid down & was repositioned.  Wound Care Orders behind both ears: Unit staff nurses to cleanse wound with wound spray from par room, then apply baby Optifoam Gentle Silicone dressing, change every 2 days & prn soilage.  Will turn over care to unit nursing staff at this time & sign off.   Anika ZAMORAN, RN, CWOCN, CFCN

## 2024-05-23 NOTE — CARE COORDINATION
Per notes Palliative Care still waiting for pt's daughter Aimee Garcia's decision to proceed with hospice or PEG tube insertion.    Spoke with [facility] [phone] just in case family wants pt to a nursing facility.  She stated to call [phone] 8am- 5 pm and ask for Q to start the transition process to try to get contract with any of their in network facilities.            Debbie Jo, SERAN RN  Care Management

## 2024-05-23 NOTE — PROGRESS NOTES
'finalize' a decision, but that 'we all are leaning toward hospice care but I will let you know tomorrow.' PLAN: Follow up tomorrow with daughter Aimee for plan of care surrounding hospice/CMO decision. Patient remains DNR/DNI.      5/21/24: Patient was seen in presence of palliative care medical social worker.  Patient's daughter Ms. Stern is also at bedside.  Patient remains on 20 L oxygen and NG tube feeding.  Patient opens eyes but unable to participate in decision-making process regarding goals of care due to his current ongoing medical and mental status.  Patient's son, Mr. Klein and another daughter Ms. Bella were on the phone.  We discussed with them about patient's overall clinical conditions and progress in last few days.  They understands patient's poor prognosis.  Patient's family was given 2 options. [1] PEG tube placement if can be done by GI/IR.  Discussed with the family about all possible risk and side effects of artificial tube feeding.  Also discussed with the family about patient's AMD specifically mentioning that he does not want to prolong his life when he has advanced dementia. [2] comfort treatment, hospice care.  Discussed with them about commonly used medication like morphine and its role and comfort care.  Also explained them about risk of cardiac arrest and sudden cardiac death associated with dysphagia as well as risk of dehydration if he does not eat after taking NG tube out.  We also discussed with them about benefits and burdens of CPR, shocks and intubation and his risk of sudden cardiac death.  Family is in agreement with DNR/DNI status now.  Paperwork was signed.  Most of family members are leaning towards comfort care but they need to discuss with some other family members tonight before finalizing a decision by tomorrow morning.  [facility] and [facility] hosp[facility] program upon discharge.  However,, patient may benefit from GIP  IDT    Anticipatory grief assessment:   [x] Normal  / [] Maladaptive     If \"Maladaptive\" to discuss with social work during IDT         LAB AND IMAGING FINDINGS:   Objective data reviewed:  labs, images, records, medication use, vitals, and chart     FINAL COMMENTS   Thank you for allowing Palliative Medicine to participate in the care of Paul Bobby.    Only check if applicable and billing time based rather than MDM  [x] The total encounter time on this service date was _50__ minutes which was spent performing a face-to-face encounter and personally completing the provider-level activities documented in the note. This includes time spent prior to the visit and after the visit in direct care of the patient. This time does not include time spent in any separately reportable services.    Electronically signed by   [facility]ke [facility]  Palliative Care Team  on 5/23/2024 at 4:07 PM

## 2024-05-23 NOTE — PROGRESS NOTES
Chart reviewed.  Went to bedside to assess patient for GIP.  Upon arrival, patient was sitting in bed being fed dinner by bedside caregiver.  Pt confused, comfortable appearing.  Currently on 2L oxygen via nasal cannula.  VS:  /73 HR 96 RR 18 Sats 100%.      Patient's symptoms are currently well-controlled, therefore, he does not meet criteria for GIP at this time.  Will reassess tomorrow. Will also call family in the morning to see if they are interested in patient having hospice support at home.    Flavia Smith MDIV, BSN, RN  Hospice Liaison  [address], [address]  [place] [address]  Office: [phone]  Fax: [phone]  Mobile:  [phone]  Email: [email or web address]

## 2024-05-23 NOTE — PROGRESS NOTES
Palliative Medicine   [facility] [phone]  [facility] [phone]    CODE STATUS:  DNR / DNI     AMD STATUS:  Aimee Crump is MPoA, per document on file.     Pt's case discussed in palliative rounds. Pt continues to require 20L High flow O2 at 40%.  Need decision from family regarding hospice referral vs Peg evaluation.     This LMSW attempted to reach pt's child/MPoA, Aimee Crump, via telephone at [phone], to discuss family's decision, to make referrals. Left  requesting return call.     1435 hr- follow up call with pt's family. Aimee (Heber) reports they have decided to proceed with comfort measures.  Questions answered, family was notified, due to pt's high O2 requirements, he will be evaluated for general inpatient to wean pt to low enough O2 requirements to allow him to go home with support of hospice. Pt's family verbalized understanding that they will receive call from hospice to discuss GIP and transition to home when pt is ready.  Family expressed gratitude. No additional questions or concerns at this time.     Thank you for this referral to Palliative Care. The palliative care team remains available to provide support to patient and their family.     Lana Marte LMSW, APHSW-C  Palliative Medicine Inpatient   [facility]  [phone]  [facility]   Palliative COPE Line: [phone]

## 2024-05-23 NOTE — PLAN OF CARE
Problem: SLP Adult - Impaired Swallowing  Goal: By Discharge: Advance to least restrictive diet without signs or symptoms of aspiration for planned discharge setting.  See evaluation for individualized goals.  Description: Patient will:  1. Tolerate PO trials with 0 s/s overt distress in 4/5 trials  2. Utilize compensatory swallow strategies/maneuvers (decrease bite/sip, size/rate, alt. liq/sol) with min cues in 4/5 trials  3. Perform oral-motor/laryngeal exercises to increase oropharyngeal swallow function with min cues  4. Complete an objective swallow study (i.e., MBSS) to assess swallow integrity, r/o aspiration, and determine of safest LRD, min A    Recommend:   NPO with alternate means of nutrition/hydration vs comfort feeds   Strict aspiration precautions (HOB >30 degrees at all times, Oral care TID)    5/22/2024 1350 by Cira Bojorquez, SLP  Outcome: Not Progressing     Problem: SLP Adult - Impaired Swallowing  Goal: By Discharge: Advance to least restrictive diet without signs or symptoms of aspiration for planned discharge setting.  See evaluation for individualized goals.  Description: Patient will:  1. Tolerate PO trials with 0 s/s overt distress in 4/5 trials  2. Utilize compensatory swallow strategies/maneuvers (decrease bite/sip, size/rate, alt. liq/sol) with min cues in 4/5 trials  3. Perform oral-motor/laryngeal exercises to increase oropharyngeal swallow function with min cues  4. Complete an objective swallow study (i.e., MBSS) to assess swallow integrity, r/o aspiration, and determine of safest LRD, min A    Recommend:   NPO with alternate means of nutrition/hydration vs comfort feeds   Strict aspiration precautions (HOB >30 degrees at all times, Oral care TID)    5/22/2024 1350 by Cira Bojorquez, SLP  Outcome: Not Progressing

## 2024-05-23 NOTE — PLAN OF CARE
Problem: Occupational Therapy - Adult  Goal: By Discharge: Performs self-care activities at highest level of function for planned discharge setting.  See evaluation for individualized goals.  Description: Occupational Therapy Goals:  Initiated 5/14/2024, Re-evaluated 5/21/2024 with goals adjusted to current performance level to be met within 7-10 days.    1.  Patient will perform grooming with moderate assistance.  2.  Patient will perform upper body dressing with moderate assistance.  3.  Patient will participate in upper extremity therapeutic exercise/activities with minimal assistance/contact guard assist for 8-10 minutes to increase strength/endurance for ADLs.    4.  Patient will follow 50% of commands during functional activities with minimal verbal cues.    PLOF: PLOF obtained from chart review. Pt lives w/ family in a two level house, able to live on main level, walk in shower. Pt has cane. Unsure how much assistance pt needed w/ ADLs and mobility.     Outcome: Progressing    OCCUPATIONAL THERAPY TREATMENT    Patient: Paul Bobby (83 y.o. male)  Date: 5/23/2024  Diagnosis: Acute respiratory failure (HCC) [J96.00]  Altered mental status, unspecified altered mental status type [R41.82]  Acute hypoxemic respiratory failure (HCC) [J96.01]  AMS (altered mental status) [R41.82] AMS (altered mental status)      Precautions: Fall Risk, Aspiration Risk,  ,  ,  ,  ,  ,  ,      Chart, occupational therapy assessment, plan of care, and goals were reviewed.  ASSESSMENT:  SLP co tx to maximize pt safety and participation. Pt presented supine in bed upon entry, on 20L HFNC, drowsy, and agreeable for participation. Pt with improved command following this date able to answer Y/N question appropriately. Pt continues to require MAX A for oral care utilizing toothette. Pt was positioned for comfort and left with HOB elevated and bed alarm active. RN made aware.  Day 2 of 3 day trial  Progression toward goals:  []

## 2024-05-23 NOTE — PROGRESS NOTES
Bedside and Verbal shift change report given to Saba (oncoming nurse) by Esme (offgoing nurse). Report included the following information Nurse Handoff Report, Index, Intake/Output, MAR, Cardiac Rhythm sinus rhythm, Neuro Assessment, and Event Log.

## 2024-05-23 NOTE — PROGRESS NOTES
[facility]  Progress Note    Patient: Paul Bobby Age: 83 y.o. : 1940 MR#: [id number] SSN: [id number]  Date: 2024         Assessment/Plan:     Acute Hypoxic Respiratory Failure- requiring mechanical ventilator, in the setting aspiration PNA and parainfluenza 3, extubated  to HFNC   Appreciate pulm assistance  Wean O2 as tolerated, RT to follow  CPT, saline nebs, metaneb per pulm  Aspiration PNA; resp culture 5/10 +MRSA, klebsiella aerogenes   Parainfluenza 3   Acute Encephalopathy-toxic/metabolic in nature superimposed on dementia, improving   Metabolic acidosis, lactic acidosis, resolved   ESTRELLA- prerenal azotemia vs. Ishemic ATN, resolved   Septic shock vs. hypovolemic- in the setting of multifocal PNA with partial b/l LL collapse and consildation/Aspiration PNA. Shock resolved  Chronic bronchitis and emphysema: with distal mucoid impaction, s/p multiple bronchs. Not in acute exacerbation   Moderate colonic stool burden  Colace, PRN miralax  Cholelithiasis: no evidence of acute cholecystitis   Hx DM II   Hx HLD   Dementia  Palliative care following  Fall precautions  Family has agreed to hospice and picked a compnay. Patient transitioned to comfort care.         I have evaluated the patient after initiation of intervention.  The patient is comfortable, uninjured, but continues to pose an imminent risk of injury to self and or serious disruption of treatment.  The patient's current medical and behavioral conditions that warrant the use intervention include danger to self and Interference with medical treatment. Restraint or seclusion will be discontinued at the earliest possible time, regardless of the scheduled expiration of the order.      DVT Prophylaxis:  []Lovenox  [x]Hep SQ  []SCDs  []Coumadin   []On Heparin gtt []PO anticoagulant    Anticipated discharge:Home with hospice. Hopefully tomorrow.     Subjective:     Patient remains on high flow  8.5 - 10.1 MG/DL   Phosphorus    Collection Time: 05/23/24  4:51 AM   Result Value Ref Range    Phosphorus 3.6 2.5 - 4.9 MG/DL   Magnesium    Collection Time: 05/23/24  4:51 AM   Result Value Ref Range    Magnesium 2.4 1.6 - 2.6 mg/dL   CBC with Auto Differential    Collection Time: 05/23/24  4:51 AM   Result Value Ref Range    WBC 7.8 4.6 - 13.2 K/uL    RBC 3.86 (L) 4.35 - 5.65 M/uL    Hemoglobin 11.6 (L) 13.0 - 16.0 g/dL    Hematocrit 37.3 36.0 - 48.0 %    MCV 96.6 78.0 - 100.0 FL    MCH 30.1 24.0 - 34.0 PG    MCHC 31.1 31.0 - 37.0 g/dL    RDW 13.5 11.6 - 14.5 %    Platelets 414 135 - 420 K/uL    MPV 10.9 9.2 - 11.8 FL    Nucleated RBCs 0.0 0  WBC    nRBC 0.00 0.00 - 0.01 K/uL    Neutrophils % 52 40 - 73 %    Lymphocytes % 34 21 - 52 %    Monocytes % 11 (H) 3 - 10 %    Eosinophils % 2 0 - 5 %    Basophils % 1 0 - 2 %    Immature Granulocytes % 1 (H) 0.0 - 0.5 %    Neutrophils Absolute 4.1 1.8 - 8.0 K/UL    Lymphocytes Absolute 2.6 0.9 - 3.6 K/UL    Monocytes Absolute 0.8 0.05 - 1.2 K/UL    Eosinophils Absolute 0.2 0.0 - 0.4 K/UL    Basophils Absolute 0.1 0.0 - 0.1 K/UL    Immature Granulocytes Absolute 0.0 0.00 - 0.04 K/UL    Differential Type AUTOMATED     POCT Glucose    Collection Time: 05/23/24 11:41 AM   Result Value Ref Range    POC Glucose 251 (H) 70 - 110 mg/dL       Imaging:  No results found.    Time spent reviewing records, independently interpreting results, obtaining history from patient or caregiver, performing physical exam, ordering tests and medications, communicating with specialists, documenting in the chart, and coordinating overall care is 35 minutes    Celestina De La Rosa, DO     May 23, 2024

## 2024-05-23 NOTE — PLAN OF CARE
Problem: SLP Adult - Impaired Swallowing  Goal: By Discharge: Advance to least restrictive diet without signs or symptoms of aspiration for planned discharge setting.  See evaluation for individualized goals.  Description: Patient will:  1. Tolerate PO trials with 0 s/s overt distress in 4/5 trials  2. Utilize compensatory swallow strategies/maneuvers (decrease bite/sip, size/rate, alt. liq/sol) with min cues in 4/5 trials  3. Perform oral-motor/laryngeal exercises to increase oropharyngeal swallow function with min cues  4. Complete an objective swallow study (i.e., MBSS) to assess swallow integrity, r/o aspiration, and determine of safest LRD, min A    Recommend:   NPO with alternate means of nutrition/hydration vs comfort feeds   Strict aspiration precautions (HOB >30 degrees at all times, Oral care TID)    Outcome: Progressing   SPEECH LANGUAGE PATHOLOGY DYSPHAGIA TREATMENT    Patient: Paul Bobby (83 y.o. male)  Date: 5/23/2024  Diagnosis: Acute respiratory failure (HCC) [J96.00]  Altered mental status, unspecified altered mental status type [R41.82]  Acute hypoxemic respiratory failure (HCC) [J96.01]  AMS (altered mental status) [R41.82] AMS (altered mental status)      Precautions: Standard, aspiration  PLOF: As per H&P      ASSESSMENT:  Pt seen for follow up dysphagia management. Pt seen in conjunction with OT, alert to name/tactile cues, on 20L 40%FiO2 HFNC. Pt continues to display overt s/s of aspiration c/b strong/wet cough following 1/2 tsp trials of honey thick liquids x2. Suspect poor endurance putting Pt at high risk for aspiration with PO. Oral care completed with toothette and suctioning. Pt with improved intelligible verbalizations on this date and able to answer simple Y/N questions appropriately regarding self. Positive oral clearance with decreased laryngeal elevation to palpation with both trials. Recommend continuation of NPO with dobhoff, consideration of more permanent means of

## 2024-05-24 VITALS
TEMPERATURE: 98.7 F | HEIGHT: 70 IN | WEIGHT: 167.3 LBS | RESPIRATION RATE: 20 BRPM | OXYGEN SATURATION: 98 % | DIASTOLIC BLOOD PRESSURE: 56 MMHG | SYSTOLIC BLOOD PRESSURE: 91 MMHG | HEART RATE: 80 BPM | BODY MASS INDEX: 23.95 KG/M2

## 2024-05-24 PROCEDURE — 6370000000 HC RX 637 (ALT 250 FOR IP): Performed by: INTERNAL MEDICINE

## 2024-05-24 PROCEDURE — 6360000002 HC RX W HCPCS: Performed by: INTERNAL MEDICINE

## 2024-05-24 PROCEDURE — 99239 HOSP IP/OBS DSCHRG MGMT >30: CPT | Performed by: STUDENT IN AN ORGANIZED HEALTH CARE EDUCATION/TRAINING PROGRAM

## 2024-05-24 PROCEDURE — 94640 AIRWAY INHALATION TREATMENT: CPT

## 2024-05-24 PROCEDURE — 94761 N-INVAS EAR/PLS OXIMETRY MLT: CPT

## 2024-05-24 RX ADMIN — IPRATROPIUM BROMIDE AND ALBUTEROL SULFATE 1 DOSE: .5; 3 SOLUTION RESPIRATORY (INHALATION) at 00:31

## 2024-05-24 RX ADMIN — ARFORMOTEROL TARTRATE 15 MCG: 15 SOLUTION RESPIRATORY (INHALATION) at 07:34

## 2024-05-24 RX ADMIN — IPRATROPIUM BROMIDE AND ALBUTEROL SULFATE 1 DOSE: .5; 3 SOLUTION RESPIRATORY (INHALATION) at 04:18

## 2024-05-24 RX ADMIN — IPRATROPIUM BROMIDE AND ALBUTEROL SULFATE 1 DOSE: .5; 3 SOLUTION RESPIRATORY (INHALATION) at 07:33

## 2024-05-24 NOTE — DISCHARGE SUMMARY
Discharge Summary    Patient: Paul Bobby MRN: [id number]  CSN: [id number]    YOB: 1940  Age: 83 y.o.  Sex: male    DOA: 5/6/2024 LOS:  LOS: 18 days   Discharge Date: 5/24/24     Admission Diagnosis: Acute respiratory failure (HCC) [J96.00]  Altered mental status, unspecified altered mental status type [R41.82]  Acute hypoxemic respiratory failure (HCC) [J96.01]  AMS (altered mental status) [R41.82]    Discharge Diagnosis:    Principal Problem:    AMS (altered mental status)  Active Problems:    Acute respiratory failure (HCC)    Aspiration pneumonia of both lungs due to vomit (HCC)    Aspiration of food    Mucus plugging of bronchi    Acute encephalopathy    Acute hypoxemic respiratory failure (HCC)    ESTRELLA (acute kidney injury) (HCC)    Septic shock (HCC)    Lactic acidosis    Multifocal pneumonia    Metabolic acidosis    Palliative care encounter    Goals of care, counseling/discussion    DNR (do not resuscitate) discussion    Aspiration into airway    Parainfluenza    Chronic obstructive pulmonary disease (HCC)    Encounter for hospice care discussion    Parainfluenza infection    Poor prognosis    Oropharyngeal dysphagia    Encounter for palliative care  Resolved Problems:    * No resolved hospital problems. *       Discharge Condition: Stable  Discharge Disposition: Hospice     PHYSICAL EXAM  Visit Vitals  BP 91/69   Pulse 89   Temp 98.7 °F (37.1 °C) (Axillary)   Resp 20   Ht 1.778 m (5' 10\")   Wt 75.9 kg (167 lb 4.8 oz)   SpO2 92%   PF (!) 1 L/min   BMI 24.01 kg/m²       General: Alert, cooperative, no acute distress    HEENT: NC, Atraumatic.  PERRLA, EOMI. Anicteric sclerae.  Lungs:  CTA Bilaterally. No Wheezing/Rhonchi/Rales.  Heart:  Regular  rhythm,  No murmur, No Rubs, No Gallops  Abdomen: Soft, Non distended, Non tender.  +Bowel sounds, no HSM  Extremities: No c/c/e  Psych:   Good insight. Not anxious or agitated.  Neurologic:  CN 2-12 grossly intact, oriented X 3.  No acute

## 2024-05-24 NOTE — PROGRESS NOTES
Pt's daughter at bedside, apparent questions answered, pt on room air sating 100%, no signs of discomfort noted, call light within pt's reach.

## 2024-05-24 NOTE — CARE COORDINATION
Call made to [facility] transportation [phone], spoke with Christine, will transport patient at 6:30 pm.  [facility] transportation [phone], had 8:00 pm available.  [facility] transportation [phone] does not have availability.

## 2024-05-24 NOTE — PROGRESS NOTES
Speech Pathology    Attempted follow up dysphagia management, however upon chart review Pt is now on comfort feeds/hospice. Skilled SLP services are no longer indicated. Please re-consult as needed.     Thank you for this referral.    Cira Bojorquez M.S. CCC-SLP  Speech Language Pathologist

## 2024-05-24 NOTE — PALLIATIVE CARE
Palliative Medicine    Palliative team rounded on comfort patient. He is resting quietly in bed, eyes opened. Did not respond to verbal or tactile stimuli. Respirations are easy and non labored, no oxygen in use. No family present at this time.    Palliative team remains available to provide support to Mr Bobby and his family during this hospital stay.    CODE STATUS: DNR/DNI, COMFORT MEASURES    Sarah Alba RN  Palliative Medicine Inpatient RN  Palliative COPE Line: [phone]

## 2024-05-24 NOTE — CARE COORDINATION
Requested Case Management specialist to assist with transportation to [facility].  Address is [address] [place] [address] and phone number is [phone]  Patient will require BLS transport.   Pt requires Stretcher If stretcher, reason:Hospice Care, altered mental status, aspiration pneumonia, acute encephalopathy,   Patient is currently requiring oxygen No   Height:5'10\"   Weight: 167 Ib  Pt is on isolation: Yes Isolation is for: MRSA  Is the pt ready now? yes  Requested time: 5pm  PCS Faxed: No  Insurance verified on face sheet: Yes  Auth needed for transport: No  CM completed PCS/ Envelope and placed on chart.             Debbie Jo, SERAN RN  Care Management

## 2024-05-24 NOTE — PROGRESS NOTES
Several attempts made to reach patient's daughter/POA Aimee Crump to discuss hospice services.  [phone] is a non-working number.  Called patient's daughter Jena Bobby to get alternate number for Aimee.  Jena stated Aimee may have poor signal at the moment. Left message with her to have Aimee call when she's available.    Flavia Smith MDIV, BSN, RN  Hospice Liaison  [address], [address]  [place] [address]  Office: [phone]  Fax: [phone]  Mobile:  [phone]  Email: [email or web address]

## 2024-05-24 NOTE — PROGRESS NOTES
Verbal report called in to [facility] on Pt discharging there. Report received by Nurse Inez. Report included hospital visit summary and medical history. Opportunity for questions and clarification provided. [facility] picked up Pt in stretcher. Pt en route to facility.

## 2024-05-24 NOTE — CARE COORDINATION
Called pt's daughter Aimee.  She wants pt to go on hospice placement with [facility].    1135:   Called [facility] [phone].  She stated to send clinicals to [facility].    1139:  Called pt's daughter back and she stated she does not want [facility].  She stated [facility] will be her first choice then [place] and then [place].    1143:  Called [facility].  She stated only facilities that have male beds are [facility] and [facility] and [facility] in [place] and she stated to upload pt's clinicals in [facility].    Pt's clinicals sent to 3 facilities in [facility].    1157:   Called pt's daughter.  She stated she is on the phone with [facility] and will call CM back.    1220:  Lenard called stating [facility] in [place] accepts [facility] patients.    1226:  [facility] and message left for Jazmin in Admissions [phone].  Called [facility] and spoke with David.  She stated she will review and call CM back.    1300  Met with pt's daughter Aimee.  She stated she prefers [facility].    1405:  Received a call from [facility] [phone] stating [facility] can accept pt.    1412:  Called Jazmin of [facility].  She confirmed they can receive pt today.    Updated Dr. De La Rosa.  Faxed  discharge summary to [facility] [phone] as requested.          Debbie Jo, BSN RN  Care Management

## 2024-05-24 NOTE — CARE COORDINATION
Flavia of [facility] trying to reach pt's daughter Aimee Crump.          Debbie Jo, BSN RN  Care Management

## 2024-05-24 NOTE — CARE COORDINATION
Called pt's daughter Aimee Crump [phone].  She stated her daughter just had oral surgery and they are just getting back in the house.  She stated CM can tell hospice nurse to call her back in 30 minutes.    Informed Flavia of [facility]            ASHISH Strickland RN  Care Management

## 2024-05-24 NOTE — PLAN OF CARE
Problem: Safety - Adult  Goal: Free from fall injury  5/23/2024 2354 by Saba Chavez RN  Outcome: Progressing  5/23/2024 2354 by Saba Chavez RN  Outcome: Progressing     Problem: Pain  Goal: Verbalizes/displays adequate comfort level or baseline comfort level  5/23/2024 2354 by Saba Chavez RN  Outcome: Progressing  5/23/2024 2354 by Saba Chavez RN  Outcome: Progressing     Problem: Chronic Conditions and Co-morbidities  Goal: Patient's chronic conditions and co-morbidity symptoms are monitored and maintained or improved  5/23/2024 2354 by Saba Chavez RN  Outcome: Progressing  5/23/2024 2354 by Saba Chavez RN  Outcome: Progressing     Problem: Confusion  Goal: Confusion, delirium, dementia, or psychosis is improved or at baseline  Description: INTERVENTIONS:  1. Assess for possible contributors to thought disturbance, including medications, impaired vision or hearing, underlying metabolic abnormalities, dehydration, psychiatric diagnoses, and notify attending LIP  2. [place] high risk fall precautions, as indicated  3. Provide frequent short contacts to provide reality reorientation, refocusing and direction  4. Decrease environmental stimuli, including noise as appropriate  5. Monitor and intervene to maintain adequate nutrition, hydration, elimination, sleep and activity  6. If unable to ensure safety without constant attention obtain sitter and review sitter guidelines with assigned personnel  7. Initiate Psychosocial CNS and Spiritual Care consult, as indicated  5/23/2024 2354 by Saba Chavez RN  Outcome: Progressing  5/23/2024 2354 by Saba Chavez RN  Outcome: Progressing  5/23/2024 1240 by Mera Coreas OTA  Outcome: Progressing  Flowsheets (Taken 5/23/2024 0800 by Esme Gary RN)  Effect of thought disturbance (confusion, delirium, dementia, or psychosis) are managed with adequate functional status: Assess for contributors to  thought disturbance, including medications, impaired vision or hearing, underlying metabolic abnormalities, dehydration, psychiatric diagnoses, notify LIP     Problem: Skin/Tissue Integrity  Goal: Absence of new skin breakdown  Description: 1.  Monitor for areas of redness and/or skin breakdown  2.  Assess vascular access sites hourly  3.  Every 4-6 hours minimum:  Change oxygen saturation probe site  4.  Every 4-6 hours:  If on nasal continuous positive airway pressure, respiratory therapy assess nares and determine need for appliance change or resting period.  5/23/2024 2354 by Saba Chavez RN  Outcome: Progressing  5/23/2024 2354 by Saba Chavez RN  Outcome: Progressing     Problem: Safety - Medical Restraint  Goal: Remains free of injury from restraints (Restraint for Interference with Medical Device)  Description: INTERVENTIONS:  1. Determine that other, less restrictive measures have been tried or would not be effective before applying the restraint  2. Evaluate the patient's condition at the time of restraint application  3. Inform patient/family regarding the reason for restraint  4. Q2H: Monitor safety, psychosocial status, comfort, nutrition and hydration  5/23/2024 2354 by Saba Chavez RN  Outcome: HH/HSPC Resolved Met  5/23/2024 2354 by Saba Chavez RN  Outcome: Progressing  Flowsheets  Taken 5/23/2024 1400 by Esme Gary RN  Remains free of injury from restraints (restraint for interference with medical device): Every 2 hours: Monitor safety, psychosocial status, comfort, nutrition and hydration  Taken 5/23/2024 1000 by Esme Gary RN  Remains free of injury from restraints (restraint for interference with medical device): Evaluate the patient's condition at the time of restraint application

## 2024-05-24 NOTE — PROGRESS NOTES
Received return call from patient's daughter Aimee.  Discussed [facility] philosophy, services, criteria, and IDT.  Aimee is interested in hospice but has concerns about taking patient home.  She shared that she has a special needs daughter and young children living in the home. She reported patient is active in the [facility].  Her desire is for patient to transfer to LTC facility with hospice through the [facility].       She inquired about inpatient hospice.  I explained that patient does not meet criteria for inpatient hospice at this time because his symptoms are well-controlled and he does not appear imminent.  She expressed understanding.    HEIDI Lewis made aware.   Hospice will continue following at a distance until discharge.    Flavia Smith MDIV, BSN, RN  Hospice Liaison  [address], [address]  [place] [address]  Office: [phone]  Fax: [phone]  Mobile:  [phone]  Email: [email or web address]

## 2024-05-24 NOTE — PROGRESS NOTES
Comprehensive Nutrition Assessment    Type and Reason for Visit:  Reassess, Consult, NPO/Clear Liquid    Nutrition Recommendations/Plan:   Continue current diet as appropriate per goals of care, comfort measures only.   Continue to monitor for goals of care during admission.      Malnutrition Assessment:  Malnutrition Status:  Severe malnutrition (05/14/24 1004)    Context:  Acute Illness     Findings of the 6 clinical characteristics of malnutrition:  Energy Intake:  Mild decrease in energy intake (Comment)  Weight Loss:  Unable to assess     Body Fat Loss:  Moderate body fat loss Orbital, Fat Overlying Ribs   Muscle Mass Loss:  Moderate muscle mass loss Temples (temporalis), Scapula (trapezius)  Fluid Accumulation:  No significant fluid accumulation     Strength:  Not Performed    Nutrition Assessment:    Presented with encephalopathy and hypoxia; resp failure and septic/hypovolemic shock. Emergently intubated 5/6 and transferred to ICU. TF initiated 5/9. Extubated 5/12.  SLP following, last eval 5/23- rec'd NPO with alternate means of nutrition/hydration vs comfort feeds. Pt transitioned to comfort measures 5/23. NGT removed 5/23; diet advanced to pureed. Per chart, pt to be d/c on hospice. Nutrition interventions no longer appropriate per goals of care.    Nutrition Related Findings:    Last BM: 5/23. Edema: none. Labs (5/23): reviewed. Pertinent meds: reviewed. Wound Type: None       Current Nutrition Intake & Therapies:    Average Meal Intake: %  Average Supplements Intake: None Ordered  ADULT DIET; Dysphagia - Pureed    Anthropometric Measures:  Height: 177.8 cm (5' 10\")  Ideal Body Weight (IBW): 166 lbs (75 kg)    Admission Body Weight: 74.8 kg (165 lb) (estimated)  Current Body Weight: 75.2 kg (165 lb 12.6 oz), 93.8 % IBW.    Current BMI (kg/m2): 23.8  BMI Categories: Normal Weight (BMI 22.0 to 24.9) age over 65    Estimated Daily Nutrient Needs:  Energy Requirements Based On: Formula  Weight Used